# Patient Record
Sex: FEMALE | Race: WHITE | Employment: FULL TIME | URBAN - METROPOLITAN AREA
[De-identification: names, ages, dates, MRNs, and addresses within clinical notes are randomized per-mention and may not be internally consistent; named-entity substitution may affect disease eponyms.]

---

## 2019-12-11 ENCOUNTER — ANESTHESIA EVENT (OUTPATIENT)
Dept: SURGERY | Age: 54
End: 2019-12-11
Payer: COMMERCIAL

## 2019-12-12 ENCOUNTER — APPOINTMENT (OUTPATIENT)
Dept: GENERAL RADIOLOGY | Age: 54
End: 2019-12-12
Attending: ORTHOPAEDIC SURGERY
Payer: COMMERCIAL

## 2019-12-12 ENCOUNTER — ANESTHESIA (OUTPATIENT)
Dept: SURGERY | Age: 54
End: 2019-12-12
Payer: COMMERCIAL

## 2019-12-12 ENCOUNTER — HOSPITAL ENCOUNTER (OUTPATIENT)
Age: 54
Setting detail: OUTPATIENT SURGERY
Discharge: HOME | End: 2019-12-12
Attending: ORTHOPAEDIC SURGERY | Admitting: ORTHOPAEDIC SURGERY
Payer: COMMERCIAL

## 2019-12-12 VITALS
WEIGHT: 220 LBS | OXYGEN SATURATION: 97 % | HEART RATE: 71 BPM | DIASTOLIC BLOOD PRESSURE: 59 MMHG | SYSTOLIC BLOOD PRESSURE: 103 MMHG | TEMPERATURE: 97.1 F | RESPIRATION RATE: 16 BRPM

## 2019-12-12 DIAGNOSIS — M20.12 HALLUX VALGUS (ACQUIRED), LEFT FOOT: Primary | ICD-10-CM

## 2019-12-12 PROCEDURE — C1713 ANCHOR/SCREW BN/BN,TIS/BN: HCPCS | Performed by: ORTHOPAEDIC SURGERY

## 2019-12-12 PROCEDURE — 74011250636 HC RX REV CODE- 250/636: Performed by: ORTHOPAEDIC SURGERY

## 2019-12-12 PROCEDURE — 77030002933 HC SUT MCRYL J&J -A: Performed by: ORTHOPAEDIC SURGERY

## 2019-12-12 PROCEDURE — 76010000160 HC OR TIME 0.5 TO 1 HR INTENSV-TIER 1: Performed by: ORTHOPAEDIC SURGERY

## 2019-12-12 PROCEDURE — 77030000032 HC CUF TRNQT ZIMM -B: Performed by: ORTHOPAEDIC SURGERY

## 2019-12-12 PROCEDURE — 74011250636 HC RX REV CODE- 250/636: Performed by: ANESTHESIOLOGY

## 2019-12-12 PROCEDURE — 76942 ECHO GUIDE FOR BIOPSY: CPT | Performed by: ORTHOPAEDIC SURGERY

## 2019-12-12 PROCEDURE — 77030040572 HC DRVR HEX PROSTEP MICA WRGH -C: Performed by: ORTHOPAEDIC SURGERY

## 2019-12-12 PROCEDURE — 76010010054 HC POST OP PAIN BLOCK: Performed by: ORTHOPAEDIC SURGERY

## 2019-12-12 PROCEDURE — 77030003044 HC WRE K WRGH -B: Performed by: ORTHOPAEDIC SURGERY

## 2019-12-12 PROCEDURE — 77030040573 HC BUR PROSTEP MICA WRGH -C: Performed by: ORTHOPAEDIC SURGERY

## 2019-12-12 PROCEDURE — 76210000006 HC OR PH I REC 0.5 TO 1 HR: Performed by: ORTHOPAEDIC SURGERY

## 2019-12-12 PROCEDURE — 77030040571: Performed by: ORTHOPAEDIC SURGERY

## 2019-12-12 PROCEDURE — 76060000032 HC ANESTHESIA 0.5 TO 1 HR: Performed by: ORTHOPAEDIC SURGERY

## 2019-12-12 PROCEDURE — 77030003602 HC NDL NRV BLK BBMI -B: Performed by: ANESTHESIOLOGY

## 2019-12-12 PROCEDURE — 77030002916 HC SUT ETHLN J&J -A: Performed by: ORTHOPAEDIC SURGERY

## 2019-12-12 PROCEDURE — 76210000020 HC REC RM PH II FIRST 0.5 HR: Performed by: ORTHOPAEDIC SURGERY

## 2019-12-12 PROCEDURE — 77030018836 HC SOL IRR NACL ICUM -A: Performed by: ORTHOPAEDIC SURGERY

## 2019-12-12 PROCEDURE — 74011250636 HC RX REV CODE- 250/636: Performed by: REGISTERED NURSE

## 2019-12-12 DEVICE — HV SCREW
Type: IMPLANTABLE DEVICE | Site: FOOT | Status: FUNCTIONAL
Brand: PROSTEP

## 2019-12-12 DEVICE — SCREW BNE L60MM DIA4MM PROSTEP MICA: Type: IMPLANTABLE DEVICE | Site: FOOT | Status: FUNCTIONAL

## 2019-12-12 RX ORDER — SODIUM CHLORIDE 0.9 % (FLUSH) 0.9 %
5-40 SYRINGE (ML) INJECTION EVERY 8 HOURS
Status: DISCONTINUED | OUTPATIENT
Start: 2019-12-12 | End: 2019-12-12 | Stop reason: HOSPADM

## 2019-12-12 RX ORDER — MIDAZOLAM HYDROCHLORIDE 1 MG/ML
2 INJECTION, SOLUTION INTRAMUSCULAR; INTRAVENOUS
Status: COMPLETED | OUTPATIENT
Start: 2019-12-12 | End: 2019-12-12

## 2019-12-12 RX ORDER — SODIUM CHLORIDE, SODIUM LACTATE, POTASSIUM CHLORIDE, CALCIUM CHLORIDE 600; 310; 30; 20 MG/100ML; MG/100ML; MG/100ML; MG/100ML
100 INJECTION, SOLUTION INTRAVENOUS CONTINUOUS
Status: DISCONTINUED | OUTPATIENT
Start: 2019-12-12 | End: 2019-12-12 | Stop reason: HOSPADM

## 2019-12-12 RX ORDER — LIDOCAINE HYDROCHLORIDE 10 MG/ML
0.3 INJECTION INFILTRATION; PERINEURAL ONCE
Status: DISCONTINUED | OUTPATIENT
Start: 2019-12-12 | End: 2019-12-12 | Stop reason: HOSPADM

## 2019-12-12 RX ORDER — ROPIVACAINE HYDROCHLORIDE 5 MG/ML
INJECTION, SOLUTION EPIDURAL; INFILTRATION; PERINEURAL
Status: COMPLETED | OUTPATIENT
Start: 2019-12-12 | End: 2019-12-12

## 2019-12-12 RX ORDER — HYDROMORPHONE HYDROCHLORIDE 2 MG/ML
0.5 INJECTION, SOLUTION INTRAMUSCULAR; INTRAVENOUS; SUBCUTANEOUS
Status: DISCONTINUED | OUTPATIENT
Start: 2019-12-12 | End: 2019-12-12 | Stop reason: HOSPADM

## 2019-12-12 RX ORDER — CEFAZOLIN SODIUM/WATER 2 G/20 ML
2 SYRINGE (ML) INTRAVENOUS ONCE
Status: DISCONTINUED | OUTPATIENT
Start: 2019-12-12 | End: 2019-12-12

## 2019-12-12 RX ORDER — PROPOFOL 10 MG/ML
INJECTION, EMULSION INTRAVENOUS
Status: DISCONTINUED | OUTPATIENT
Start: 2019-12-12 | End: 2019-12-12 | Stop reason: HOSPADM

## 2019-12-12 RX ORDER — PROPOFOL 10 MG/ML
INJECTION, EMULSION INTRAVENOUS AS NEEDED
Status: DISCONTINUED | OUTPATIENT
Start: 2019-12-12 | End: 2019-12-12 | Stop reason: HOSPADM

## 2019-12-12 RX ORDER — SODIUM CHLORIDE 0.9 % (FLUSH) 0.9 %
5-40 SYRINGE (ML) INJECTION AS NEEDED
Status: DISCONTINUED | OUTPATIENT
Start: 2019-12-12 | End: 2019-12-12 | Stop reason: HOSPADM

## 2019-12-12 RX ORDER — CEFAZOLIN SODIUM/WATER 2 G/20 ML
2 SYRINGE (ML) INTRAVENOUS ONCE
Status: COMPLETED | OUTPATIENT
Start: 2019-12-12 | End: 2019-12-12

## 2019-12-12 RX ORDER — FENTANYL CITRATE 50 UG/ML
100 INJECTION, SOLUTION INTRAMUSCULAR; INTRAVENOUS ONCE
Status: COMPLETED | OUTPATIENT
Start: 2019-12-12 | End: 2019-12-12

## 2019-12-12 RX ORDER — CEPHALEXIN 500 MG/1
500 CAPSULE ORAL 4 TIMES DAILY
Qty: 12 CAP | Refills: 0 | Status: SHIPPED | OUTPATIENT
Start: 2019-12-12

## 2019-12-12 RX ORDER — OXYCODONE HYDROCHLORIDE 5 MG/1
10 TABLET ORAL
Status: DISCONTINUED | OUTPATIENT
Start: 2019-12-12 | End: 2019-12-12 | Stop reason: HOSPADM

## 2019-12-12 RX ADMIN — Medication 2 G: at 07:48

## 2019-12-12 RX ADMIN — PROPOFOL 50 MG: 10 INJECTION, EMULSION INTRAVENOUS at 07:54

## 2019-12-12 RX ADMIN — PROPOFOL 30 MG: 10 INJECTION, EMULSION INTRAVENOUS at 07:47

## 2019-12-12 RX ADMIN — MIDAZOLAM 2 MG: 1 INJECTION INTRAMUSCULAR; INTRAVENOUS at 07:13

## 2019-12-12 RX ADMIN — ROPIVACAINE HYDROCHLORIDE 18 ML: 5 INJECTION, SOLUTION EPIDURAL; INFILTRATION; PERINEURAL at 07:17

## 2019-12-12 RX ADMIN — ROPIVACAINE HYDROCHLORIDE 10 ML: 5 INJECTION, SOLUTION EPIDURAL; INFILTRATION; PERINEURAL at 07:19

## 2019-12-12 RX ADMIN — PROPOFOL 140 MCG/KG/MIN: 10 INJECTION, EMULSION INTRAVENOUS at 07:45

## 2019-12-12 RX ADMIN — FENTANYL CITRATE 100 MCG: 50 INJECTION, SOLUTION INTRAMUSCULAR; INTRAVENOUS at 07:13

## 2019-12-12 RX ADMIN — MEPIVACAINE HYDROCHLORIDE 10 ML: 15 INJECTION, SOLUTION EPIDURAL; INFILTRATION at 07:19

## 2019-12-12 RX ADMIN — PROPOFOL 80 MG: 10 INJECTION, EMULSION INTRAVENOUS at 07:45

## 2019-12-12 RX ADMIN — SODIUM CHLORIDE, SODIUM LACTATE, POTASSIUM CHLORIDE, AND CALCIUM CHLORIDE 100 ML/HR: 600; 310; 30; 20 INJECTION, SOLUTION INTRAVENOUS at 06:45

## 2019-12-12 RX ADMIN — MEPIVACAINE HYDROCHLORIDE 18 ML: 15 INJECTION, SOLUTION EPIDURAL; INFILTRATION at 07:17

## 2019-12-12 NOTE — ANESTHESIA PREPROCEDURE EVALUATION
Relevant Problems   No relevant active problems       Anesthetic History               Review of Systems / Medical History      Pulmonary          Smoker         Neuro/Psych              Cardiovascular                  Exercise tolerance: >4 METS     GI/Hepatic/Renal  Within defined limits              Endo/Other  Within defined limits           Other Findings              Physical Exam    Airway  Mallampati: I  TM Distance: > 6 cm  Neck ROM: normal range of motion   Mouth opening: Normal     Cardiovascular    Rhythm: regular  Rate: normal         Dental    Dentition: Edentulous, Full lower dentures and Full upper dentures     Pulmonary  Breath sounds clear to auscultation               Abdominal         Other Findings            Anesthetic Plan    ASA: 2  Anesthesia type: total IV anesthesia      Post-op pain plan if not by surgeon: peripheral nerve block single    Induction: Intravenous  Anesthetic plan and risks discussed with: Patient and Family

## 2019-12-12 NOTE — ANESTHESIA PROCEDURE NOTES
Saphenous block    Start time: 12/12/2019 7:17 AM  End time: 12/12/2019 7:19 AM  Performed by: Gerardo Vila MD  Authorized by: Gerardo Vila MD       Pre-procedure:    Indications: at surgeon's request and post-op pain management    Preanesthetic Checklist: patient identified, risks and benefits discussed, site marked, timeout performed, anesthesia consent given and patient being monitored    Timeout Time: 07:17          Block Type:   Block Type:  Saphenous  Laterality:  Left  Monitoring:  Continuous pulse ox, frequent vital sign checks, heart rate, oxygen and responsive to questions  Injection Technique:  Single shot  Patient Position: supine  Prep: chlorhexidine    Location:  Cephalad tibia  Catheter size: 25.  Needle Localization:  Anatomical landmarks and infiltration    Assessment:  Number of attempts:  1  Injection Assessment:  Incremental injection every 5 mL, negative aspiration for blood, no intravascular symptoms and no paresthesia  Patient tolerance:  Patient tolerated the procedure well with no immediate complications

## 2019-12-12 NOTE — ANESTHESIA PROCEDURE NOTES
Popliteal block with ultrasound    Start time: 12/12/2019 7:13 AM  End time: 12/12/2019 7:17 AM  Performed by: Lisy Go MD  Authorized by: iLsy Go MD       Pre-procedure:    Indications: at surgeon's request and post-op pain management    Preanesthetic Checklist: patient identified, risks and benefits discussed, site marked, timeout performed, anesthesia consent given and patient being monitored    Timeout Time: 07:13          Block Type:   Block Type:  Popliteal  Laterality:  Left  Monitoring:  Continuous pulse ox, frequent vital sign checks, heart rate, oxygen and responsive to questions  Injection Technique:  Single shot  Procedures: ultrasound guided    Prep: chlorhexidine    Location:  Lower thigh  Needle Type:  Stimuplex  Needle Gauge:  20 G  Needle Localization:  Ultrasound guidance    Assessment:  Number of attempts:  1  Injection Assessment:  Incremental injection every 5 mL, local visualized surrounding nerve on ultrasound, negative aspiration for blood, no intravascular symptoms, no paresthesia and ultrasound image on chart  Patient tolerance:  Patient tolerated the procedure well with no immediate complications

## 2019-12-12 NOTE — ANESTHESIA POSTPROCEDURE EVALUATION
Procedure(s):  LEFT MIS BUNIONECTOMY  LEFT SECOND WEIL AND P1 OSTEOTOMY/ CHOICE.    total IV anesthesia    Anesthesia Post Evaluation      Multimodal analgesia: multimodal analgesia used between 6 hours prior to anesthesia start to PACU discharge  Patient location during evaluation: PACU  Patient participation: complete - patient participated  Level of consciousness: awake  Pain management: adequate  Airway patency: patent  Anesthetic complications: no  Cardiovascular status: acceptable  Respiratory status: acceptable  Hydration status: acceptable  Post anesthesia nausea and vomiting:  none      Vitals Value Taken Time   /59 12/12/2019  9:07 AM   Temp 36.2 °C (97.1 °F) 12/12/2019  8:26 AM   Pulse 71 12/12/2019  9:07 AM   Resp 16 12/12/2019  9:07 AM   SpO2 97 % 12/12/2019  9:07 AM

## 2019-12-12 NOTE — DISCHARGE INSTRUCTIONS
INSTRUCTIONS FOLLOWING FOOT SURGERY    ACTIVITY  Elevate foot (feet) for 48 hours. Use crutches or walker as directed by your doctor. Weight bearing as tolerated in post op shoe after nerve block wears off and you can feel your leg    DIET  Clear liquids until no nausea or vomiting; then light diet for the first day. Advance to regular diet on second day, unless your doctor orders otherwise. PAIN  Take pain medications as directed by your doctor. Call your doctor if pain is NOT relieved by medication. DO NOT take aspirin or blood thinners until directed by your doctor. DRESSING CARE  Keep clean and dry until follow up appointment. FOLLOW-UP PHONE CALLS  Calls will be made by nursing staff. If you have any problems, call your doctor as needed. CALL YOUR DOCTOR IF YOU HAVE  Excessive bleeding that does not stop after holding mild pressure over the area. Temperature of 101 degrees or above. Redness, excessive swelling or bruising, and/or green or yellow, smelly discharge from incision. Loss of sensation - cold, white or blue toes. AFTER ANESTHESIA  For the first 24 hours and while taking narcotics for pain: DO NOT Drive, Drink Alcoholic beverages, or make important Decisions. Be aware of dizziness following anesthesia and while taking pain medication. DISCHARGE SUMMARY from Nurse    PATIENT INSTRUCTIONS:    After general anesthesia or intravenous sedation, for 24 hours or while taking prescription Narcotics:  · Limit your activities  · Do not drive and operate hazardous machinery  · Do not make important personal or business decisions  · Do  not drink alcoholic beverages  · If you have not urinated within 8 hours after discharge, please contact your surgeon on call. *  Please give a list of your current medications to your Primary Care Provider.     *  Please update this list whenever your medications are discontinued, doses are      changed, or new medications (including over-the-counter products) are added. *  Please carry medication information at all times in case of emergency situations. These are general instructions for a healthy lifestyle:    No smoking/ No tobacco products/ Avoid exposure to second hand smoke    Surgeon General's Warning:  Quitting smoking now greatly reduces serious risk to your health. Obesity, smoking, and sedentary lifestyle greatly increases your risk for illness    A healthy diet, regular physical exercise & weight monitoring are important for maintaining a healthy lifestyle    You may be retaining fluid if you have a history of heart failure or if you experience any of the following symptoms:  Weight gain of 3 pounds or more overnight or 5 pounds in a week, increased swelling in our hands or feet or shortness of breath while lying flat in bed. Please call your doctor as soon as you notice any of these symptoms; do not wait until your next office visit. Recognize signs and symptoms of STROKE:    F-face looks uneven    A-arms unable to move or move unevenly    S-speech slurred or non-existent    T-time-call 911 as soon as signs and symptoms begin-DO NOT go       Back to bed or wait to see if you get better-TIME IS BRAIN. Learning About How to Use Crutches  Your Care Instructions  Crutches can help you walk when you have an injured hip, leg, knee, ankle, or foot. Your doctor will tell you how much weight--if any--you can put on your leg. Be sure your crutches fit you. When you stand up in your normal posture, there should be space for two or three fingers between the top of the crutch and your armpit. When you let your hands hang down, the hand  should be at your wrists. When you put your hands on the hand , your elbows should be slightly bent. To stay safe when using crutches:  · Look straight ahead, not down at your feet.   · Clear away small rugs, cords, or anything else that could cause you to trip, slip, or fall.  · Be very careful around pets and small children. They can get in your path when you least expect it. · Be sure the rubber tips on your crutches are clean and in good condition to help prevent slipping. · Avoid slick conditions, such as wet floors and snowy or icy driveways. In bad weather, be especially careful on curbs and steps. How to use crutches  Getting ready to walk    1. Bend your elbows slightly. Press the padded top parts of the crutches against your sides, under your armpits. 2. If you have been told not to put any weight on your injured leg, keep that leg bent and off the ground. How to walk with crutches when you can put weight on the injured leg    1. Put both crutches about 12 inches in front of you. The crutches and your feet should form a triangle. Hold the crutches close enough to your body so you can push straight down on them, but leave room between the crutches for your body to pass through. Don't lean forward to reach farther. 2. Put your weight on the handgrips, not on the pads under your arms. Constant pressure against your underarms can cause numbness. 3. Move your weak or injured leg forward so it's almost even with the crutches. 4. Bring your good leg up, so it's even with your weak or injured leg. 5. Move your crutches about 12 inches in front of you, and start the next step. Sitting down    1. To sit, back up to the chair. Use one hand to hold both crutches by the handgrips, beside your injured leg. With the other hand, hold onto the seat and slowly lower yourself onto the chair. 2. Lay the crutches on the ground near your chair. If you prop them up, they may fall over. Getting up from a chair    1. To get up from a chair,  the crutches and put them in one hand beside your injured leg. 2. Put your weight on the handgrips of the crutches and on your strong leg to stand up. How to go up and down stairs using crutches    1.  Stand near the edge of the stairs. 2. Go up or down the stairs. ? If you are going up, step up with your stronger leg. Then bring the crutches and your weak or injured leg to the upper step. ? If you are going down, put your crutches and your weak or injured leg on the lower step. Then bring your stronger leg down to the lower step. Remember \"up with the good, and down with the bad\" to help you lead with the correct leg. Crutches: How to go up and down stairs with handrails    1. Stand near the edge of the stairs. 2. Put both crutches under the arm opposite the handrail. 3. Use the hand opposite the handrail to hold both crutches by the handgrips. 4. Hold onto the handrail as you go up or down. 5. Go up or down the stairs. ? If you are going up, step up with your stronger leg. Then bring the crutches and your weak or injured leg to the upper step. ? If you are going down, put your crutches and your weak or injured leg on the lower step. Then bring your stronger leg down to the lower step. Remember \"up with the good, down with the bad\" to help you lead with the correct leg. Follow-up care is a key part of your treatment and safety. Be sure to make and go to all appointments, and call your doctor if you are having problems. It's also a good idea to know your test results and keep a list of the medicines you take. Where can you learn more? Go to http://danica-mindy.info/. Enter Z737 in the search box to learn more about \"Learning About How to Use Crutches. \"  Current as of: June 26, 2019  Content Version: 12.2  © 7406-4440 [employer]. Care instructions adapted under license by [employer] (which disclaims liability or warranty for this information). If you have questions about a medical condition or this instruction, always ask your healthcare professional. [address] any warranty or liability for your use of this information.

## 2019-12-13 NOTE — OP NOTES
[facility]  OPERATIVE REPORT    Name:  Michelle Manzo  MR#:  [id number]  :  1965  ACCOUNT #:  [de-identified]  DATE OF SERVICE:  2019    PREOPERATIVE DIAGNOSES:  1. Left hallux valgus. 2.  Left fixed second clawtoe deformity. POSTOPERATIVE DIAGNOSES:  1. Left hallux valgus. 2.  Left fixed second clawtoe deformity. PROCEDURES PERFORMED:  1. Left double-level bunionectomy, [id number].  2.  Left second MTP Weil metatarsal osteotomy, 49918. 3.  Left second proximal phalangeal closing wedge osteotomy, 28885. SURGEON:  Oscar Can III, MD        ANESTHESIA:  Popliteal block with monitored anesthesia care. ESTIMATED BLOOD LOSS:  Minimal.    TOURNIQUET TIME:  24 minutes at 250 mmHg. ANTIBIOTIC PROPHYLAXIS:  Ancef given prior to the procedure. INDICATIONS FOR PROCEDURE:  The patient is a 19-year-old white female with symptomatic left hallux valgus and fixed second clawtoe who has failed conservative therapy and desires surgical treatment. Risks and benefits of the procedure including, but not limited to, risks of anesthetic complications, myocardial infarction, stroke, death; and surgical complications including damage to nerves and blood vessels, risks of infection, incomplete pain relief, risk of malunion, risk of nonunion, risk of recurrence, need for additional surgery were discussed with the patient. She understands the risks and wished to proceed with surgery at this time. PROCEDURE:  The patient's operative site was marked with indelible ink in the preop holding area. A block was placed by the Department of Anesthesia. The patient was brought to the operating room and placed supine. After preop surgical timeout, the left lower extremity was identified as the surgical site, prepped and draped in a standard sterile fashion using ChloraPrep solution.  A small incision was made near the first tarsometatarsal joint where a guidewire from the cannulated screw system was advanced. A separate small incision was then placed distally. A [employer] lucy was then introduced and a Chevron osteotomy was performed with a capital fragment shifted approximately 60-70% laterally and secured using guidewire from the cannulated screw system. A lag screw was then placed over the guidewire with good purchase noted. An Luigi osteotomy was performed to the proximal phalanx and secured using a compression screw at that time percutaneously as well; and at that point, a metatarsal Weil osteotomy of the second toe was performed under fluoroscopic guidance using a [facility] lucy. A separate plantar approach to the MTP joint of the second toe was also performed at that time where the lucy was then introduced and a closing phalangeal osteotomy was performed with good correction of the patient's clawtoe deformity. The wounds were then irrigated and closed using Monocryl and nylon sutures. A sterile dressing was then applied. Anesthesia was discontinued. The patient was transferred to recovery bed and taken to recovery room in satisfactory condition. She appeared to tolerate the procedure well. There were no apparent general or anesthetic complications. All needle and sponge counts were correct.       MD ROSA M [id number]  D:  12/12/2019 16:52  T:  12/13/2019 4:32  JOB #:  [id number]

## 2022-09-19 ENCOUNTER — HOSPITAL ENCOUNTER (OUTPATIENT)
Dept: PHYSICAL THERAPY | Age: 57
Setting detail: RECURRING SERIES
Discharge: HOME | End: 2022-09-22
Payer: COMMERCIAL

## 2022-09-19 PROCEDURE — 97110 THERAPEUTIC EXERCISES: CPT

## 2022-09-19 PROCEDURE — 97161 PT EVAL LOW COMPLEX 20 MIN: CPT

## 2022-09-19 ASSESSMENT — PAIN SCALES - GENERAL: PAINLEVEL_OUTOF10: 3

## 2022-09-19 ASSESSMENT — PAIN DESCRIPTION - LOCATION: LOCATION: BACK

## 2022-09-19 NOTE — THERAPY EVALUATION
John Moore  : 1965  Primary: Matthew Medina  Secondary:  [address] [address]  Phone: [phone]  Fax: [phone] Plan Frequency: 1 x week  Plan of Care/Certification Expiration Date: 22    PT Visit Info: Total # of Visits Approved: 12  Total # of Visits to Date: 1  Progress Note Counter: 1    Visit Count:  1    OUTPATIENT PHYSICAL THERAPY:OP NOTE TYPE: Initial Assessment 2022               Episode  Appt Desk         Treatment Diagnosis:    Generalized Muscle Weakness (M62.81)  Low Back Pain (M54.5)  Medical/Referring Diagnosis:    Cervical strain [O60. 1XXA]  Lumbar strain [D92.468X]  Referring Physician: Elva Luna MD MD Orders:  PT Eval and Treat   Return MD Appt:  TBD  Date of Onset:      Allergies:  Patient has no allergy information on record. Restrictions/Precautions:         Medications Last Reviewed:  2022     SUBJECTIVE   History of Injury/Illness (Reason for Referral):  Ms. Karen Armijo presents to outpatient PT with complaints of low back pain and neck pain. She reports that due to her low back pain she in unable to complete work duties, which include occasionally lifting 50+ lb objects, unloading food trucks, and prolonged standing. 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 She reports she is currently prescribed a muscle relaxant for her pain and takes naproxen/extra strength tylenol, which provide some relief for her. She denies reports of falls or use of an assistive device at home, and denies reports of numbness and tingling in her LE.      Patient Stated Goal(s): \"Wants the back pain to stop\"    Initial:   Back 3/10 Post Session:   Back 3/10    Past Medical History/Comorbidities:   Ms. Colonel Zuniga  has no past medical history on file. Ms. Colonel Zuniga  has no past surgical history on file. Social History/Living Environment:   Home Layout: One level  Home Access: Stairs to enter with rails  Entrance Stairs - Number of Steps: 4   Prior Level of Function/Work/Activity:   Prior level of function: Independent     Learning:   Does the patient/guardian have any barriers to learning?: No barriers  Will there be a co-learner?: No  What is the preferred language of the patient/guardian?: English  Is an  required?: No  How does the patient/guardian prefer to learn new concepts?: Demonstration; Pictures/Videos; Reading; Listening   Fall Risk Scale:    Total Score: 0  [place]ripa Fall Risk: Low (0-24)      OBJECTIVE     NATURE OF CONDITION Date: 9/19/22 Date:    Highest level of pain 10/10    Lowest level of pain 3/10    Aggravating factors Lifting heavy objects, prolonged sitting/standing    Alleviating factors Changing positions,   moving around     Frequency of symptoms Constant but worse   as day progresses    Description of symptoms \"Squeezing my back\", sharp, achy, dull    Location of tenderness Right low back (L4),   lower thoracic right side       RANGE OF MOTION Date: 9/19/22 Date:      RIGHT LEFT RIGHT LEFT   Lumbar Extension WNL, painful    Lumbar Flexion WNL, painful    Lumbar Rotation WNL, painful WNL     Lumbar Lateral Flexion WNL WNL       STRENGTH Date: 9/19/22 Date:      RIGHT LEFT RIGHT LEFT   Hip Flexion 3/5, painful 3+/5     Hip Extension 3+/5, painful 4/5     Hip Abduction NT NT     Knee Extension 3+ painful 4     Knee Flexion 5/5 5/5     Ankle Dorsiflexion 5/5 5/5       MOBILITY Date: 9/19/22 Date:    Bed mobility Independent     Transfers Independent     Gait Right lean, increased R knee valgus, increased pronation R ankle      ASSESSMENT   Initial Assessment: 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 She demonstrates strength deficits in her LE, gait abnormalities, and an inability to perform work duties due to her pain. Ms. [address] could benefit from skilled PT services to address her deficits to maximize her independence to improve her functional mobility. Problem List: (Impacting functional limitations): Body Structures, Functions, Activity Limitations Requiring Skilled Therapeutic Intervention: Decreased functional mobility ; Decreased ADL status; Decreased ROM; Decreased body mechanics; Decreased tolerance to work activity; Decreased strength; Decreased safe awareness; Decreased cognition; Decreased endurance; Decreased balance; Decreased sensation; Decreased high-level IADLs; Decreased fine motor control; Decreased coordination; Increased pain; Decreased posture   Therapy Prognosis:   Therapy Prognosis: Fair   Assessment Complexity:   Low Complexity    PLAN   Effective Dates: Plan of Care/Certification Expiration Date: 12/18/22   Frequency/Duration: Plan Frequency: 1 x week   Interventions Planned (Treatment may consist of any combination of the following):    Current Treatment Recommendations: Strengthening; ROM; Balance training; Functional mobility training; Transfer training; ADL/Self-care training; IADL training; Cognitive/Perceptual training; Endurance training; Gait training; Neuromuscular re-education; Stair training; Manual Therapy - Soft Tissue Mobilization; Manual Therapy - Joint Manipulation; Cognitive reorientation; Pain management; Return to work related activity; Home exercise program; Safety education & training; Patient/Caregiver education & training; Equipment evaluation, education, & procurement; Modalities; Positioning; Therapeutic activities     Goals:  Time Frame: 6 weeks   Patient will demonstrate understanding of a home exercise program independently. Patient will demonstrate lumbar AROM within normal limits without reports of pain >2/10.   Patient will improve her score on the Oswestry Index by 3 points from her initial score. Patient will demonstrate proper lifting and body mechanics independently. Patient will demonstrate improved BLE strength upon MMT to 4-/5. Outcome Measure: Tool Used: Modified Oswestry Low Back Pain Questionnaire  Score:  Initial: 18/50 (Date: 9/19/22) Most Recent: X/50 (Date: -- )   Interpretation of Score: Each section is scored on a 0-5 scale, 5 representing the greatest disability. The scores of each section are added together for a total score of 50. MEDICAL NECESSITY:  Skilled intervention continues to be required due to above deficits affecting participation in basic ADLs and overall functional tolerance. REASON FOR SERVICES/ OTHER COMMENTS:  Patient continues to require skilled intervention due to impairments affecting functional mobility. Regarding [facility] therapy, I certify that the treatment plan above will be carried out by a therapist or under their direction.     Thank you for this referral,    Kasandra Obando, SPT     Referring Physician Signature: Elio Alexandra MD _______________________________ Date _____________        Post Session Pain  Charge Capture  PT Visit Info MD Abhishek Polk

## 2022-09-19 NOTE — PROGRESS NOTES
Sal Robertson  : 1965  Primary: Criss Galdamez  Secondary:  [address] [address]  Phone: [phone]  Fax: [phone] Plan Frequency: 1 x week  Plan of Care/Certification Expiration Date: 22      PT Visit Info: Total # of Visits Approved: 12  Total # of Visits to Date: 1  Progress Note Counter: 1     Visit Count:  1   OUTPATIENT PHYSICAL THERAPY:OP NOTE TYPE: Treatment Note 2022       Episode  }Appt Desk             Treatment Diagnosis:    Generalized Muscle Weakness (M62.81)  Low Back Pain (M54.5)  Medical/Referring Diagnosis:    Cervical strain [N41. 1XXA]  Lumbar strain [Y65.280Q]  Referring Physician:  Sudhir Arriola MD MD Orders:  PT Eval and Treat   Date of Onset:  No data recorded   Allergies:   Patient has no allergy information on record. Restrictions/Precautions:  Restrictions/Precautions: Other (comment)No data recorded   Interventions Planned (Treatment may consist of any combination of the following):    Current Treatment Recommendations: Strengthening; ROM; Balance training; Functional mobility training; Transfer training; ADL/Self-care training; IADL training; Cognitive/Perceptual training; Endurance training; Gait training; Neuromuscular re-education; Stair training; Manual Therapy - Soft Tissue Mobilization; Manual Therapy - Joint Manipulation; Cognitive reorientation; Pain management; Return to work related activity; Home exercise program; Safety education & training; Patient/Caregiver education & training; Equipment evaluation, education, & procurement; Modalities; Positioning;  Therapeutic activities   Subjective Comments: See eval note    Initial:}  Back 3/10Post Session:     Back 3/10    Medications Last Reviewed:  2022    Updated Objective Findings:  See evaluation note from today    Treatment     GAIT TRAINING: (0 minutes): Gait training to improve and/or restore physical functioning hold  Supine March with Alternating Leg Lifts - 1 x daily - 7 x weekly - 3 sets - 10 reps    Treatment/Session Summary:    Treatment Assessment: Pt tolerated therapy well today. She verbalied an understanidng of her HEP handout. Communication/Consultation:  PT encouraged patient to perform HEP consistently. Equipment provided today: None  Recommendations/Intent for next treatment session: Next visit will focus on mobility and strengthening .     Total Treatment Billable Duration: 47 minutes with eval  Time In: 1304  Time Out: [id number] [facility], SPT  Hellen Kaplan, PT    Charge Capture  }Post Session Pain  PT Visit Info  [employer] Portal  MD Guidelines  Scanned Media  Benefits  MyChart    Future Appointments   Date Time Provider Mo Justin   9/28/2022 10:15 AM Hellen Kaplan, PT [facility]

## 2022-09-28 ENCOUNTER — HOSPITAL ENCOUNTER (OUTPATIENT)
Dept: PHYSICAL THERAPY | Age: 57
Setting detail: RECURRING SERIES
Discharge: HOME | End: 2022-10-01
Payer: COMMERCIAL

## 2022-09-28 PROCEDURE — 97110 THERAPEUTIC EXERCISES: CPT

## 2022-09-28 ASSESSMENT — PAIN SCALES - GENERAL: PAINLEVEL_OUTOF10: 2

## 2022-09-28 NOTE — PROGRESS NOTES
Mundo Ramirez  : 1965  Primary: Addy Parks  Secondary:  [address] [address]  Phone: [phone]  Fax: [phone] Plan Frequency: 1 x week  Plan of Care/Certification Expiration Date: 22      PT Visit Info: Total # of Visits Approved: 12  Total # of Visits to Date: 2  Progress Note Counter: 2     Visit Count:  2   OUTPATIENT PHYSICAL THERAPY:OP NOTE TYPE: Treatment Note 2022       Episode  }Appt Desk             Treatment Diagnosis:    Generalized Muscle Weakness (M62.81)  Low Back Pain (M54.5)  Medical/Referring Diagnosis:    Cervical strain [H58. 1XXA]  Lumbar strain [Q47.787V]  Referring Physician:  Felecia Robert MD MD Orders:  PT Eval and Treat   Date of Onset:  No data recorded   Allergies:   Patient has no allergy information on record. Restrictions/Precautions:  Restrictions/Precautions: Other (comment)  No data recorded   Interventions Planned (Treatment may consist of any combination of the following):    Current Treatment Recommendations: Strengthening; ROM; Balance training; Functional mobility training; Transfer training; ADL/Self-care training; IADL training; Cognitive/Perceptual training; Endurance training; Gait training; Neuromuscular re-education; Stair training; Manual Therapy - Soft Tissue Mobilization; Manual Therapy - Joint Manipulation; Cognitive reorientation; Pain management; Return to work related activity; Home exercise program; Safety education & training; Patient/Caregiver education & training; Equipment evaluation, education, & procurement; Modalities; Positioning; Therapeutic activities     Subjective Comments: Pt reports that there hasn't been much change since beginning therapy. Pt reports that the standing lumbar extension against wall exercise has brought her the most relief. She states that she is still experiencing high levels of pain at the end of a work day.  Pt reports that she is very interested in getting imaging and is planning on discussing that with her doctor in an upcoming visit. Initial:}    2/10    Post Session:       4/10    Medications Last Reviewed:  9/28/2022    Updated Objective Findings: None today. Treatment     GAIT TRAINING: (0 minutes): Gait training to improve and/or restore physical functioning as related to mobility, strength, balance and coordination. Ambulated with minimal visual, verbal, and tactile cueing related to stance phase, stride length, push off and heel strike. THERAPEUTIC EXERCISE: (45 minutes): Exercises per grid below to improve mobility, strength, and balance. Required minimal visual, verbal and tactile cues to promote proper body alignment, posture and body mechanics. Progressed resistance, range and repetitions as indicated. THERAPEUTIC ACTIVITY: (0 minutes): Therapeutic activities per grid below to improve mobility, strength and balance. Required minimal visual, verbal and tactile cues to promote static and dynamic balance in sitting/standing, as well as coordination of bilateral extremities. NEUROMUSCULAR RE-EDUCATION: (0 minutes): Exercise/activities per grid below to improve balance, coordination, kinesthetic sense, posture and proprioception. Required minimal visual, verbal and tactile cues to promote static and dynamic balance in sitting/standing, as well as coordination of bilateral extremities. MANUAL THERAPY: (0 minutes): Joint mobilization, soft tissue mobilization and manipulation was utilized and necessary because of the patient's restricted joint motion, painful spasm, loss of articular motion and restricted motion of soft tissue.      MODALITIES: (0 minutes): Vasopneumatic compression at 34 degrees      Date: 9/19/22 Date: 9/28/22 Date:    Activity/Exercise      Patient assessment/education HEP review and education      Prone press up 1 x 5  Not comfortable position  1 x 30 hold on elbows  Pain in lower back not LE  Unable to maintain position longer than 30 sec    Standing lumbar extension against wall 1 x 10   2 x 10    Supine alternating knee marches 1 x 10 each way Unable to do perform raising B knees - painful     Lower trunk rotations 1 x  5 each way 2 x 12 each way  Cueing to leave feet on mat    Glute bridge Unable to perform due to pain     Glute squeeze  Unable to perform due to pain    NuStep  Level 2  6 minutes    Curl ups   1 x 10  Pain in neck and discomfort in lower back     Hand heel rocking  1 x 12  Painful                   HEP   Standing Lumbar Extension at Wall - Forearms - 1 x daily - 7 x weekly - 2 sets - 10 reps - 5 hold  Supine Lower Trunk Rotation - 1 x daily - 7 x weekly - 3 sets - 10 reps - 3 hold  Supine March with Alternating Leg Lifts - 1 x daily - 7 x weekly - 3 sets - 10 reps    Treatment/Session Summary:    Treatment Assessment: Patient tolerated therapy poorly this morning. She was unable to perform many of the exercises due to increased pain and was unable to sustain positions for long periods of time. Patient reported most relief of symptoms when performing standing lumbar extension against the wall. She demonstrates decreased tolerance to activities performed during the session today. Communication/Consultation: PT encouraged patient to perform HEP consistently. Equipment provided today: None. Recommendations/Intent for next treatment session: Next visit will focus on mobility and strengthening.     Total Treatment Billable Duration: 45 minutes   Time In: 8131  Time Out: ROBERT Stovall, JADEN    Charge Capture  }Post Session Pain  PT Visit Info  [facility] Portal  MD Guidelines  Scanned Media  Benefits  MyChart    Future Appointments   Date Time Provider Mo Justin   10/5/2022 10:15 AM Caridad López PTA [facility]

## 2022-09-29 ENCOUNTER — HOSPITAL ENCOUNTER (OUTPATIENT)
Dept: GENERAL RADIOLOGY | Age: 57
Discharge: HOME | End: 2022-10-02

## 2022-09-29 ENCOUNTER — TRANSCRIBE ORDERS (OUTPATIENT)
Dept: OCCUPATIONAL MEDICINE | Age: 57
End: 2022-09-29

## 2022-09-29 DIAGNOSIS — T14.90XA INJURY: ICD-10-CM

## 2022-09-29 PROCEDURE — 72100 X-RAY EXAM L-S SPINE 2/3 VWS: CPT

## 2022-09-29 PROCEDURE — 72040 X-RAY EXAM NECK SPINE 2-3 VW: CPT

## 2022-10-05 ENCOUNTER — HOSPITAL ENCOUNTER (OUTPATIENT)
Dept: PHYSICAL THERAPY | Age: 57
Setting detail: RECURRING SERIES
Discharge: HOME | End: 2022-10-08
Payer: COMMERCIAL

## 2022-10-05 PROCEDURE — 97110 THERAPEUTIC EXERCISES: CPT

## 2022-10-05 NOTE — PROGRESS NOTES
Yin Valerio  : 1965  Primary: Mendel Dyers  Secondary:  [address] [address]  Phone: [phone]  Fax: [phone] Plan Frequency: 1 x week  Plan of Care/Certification Expiration Date: 22      PT Visit Info: Total # of Visits Approved: 12  Total # of Visits to Date: 3  Progress Note Counter: 3     Visit Count:  3   OUTPATIENT PHYSICAL THERAPY:OP NOTE TYPE: Treatment Note 10/5/2022       Episode  }Appt Desk             Treatment Diagnosis:    Generalized Muscle Weakness (M62.81)  Low Back Pain (M54.5)  Medical/Referring Diagnosis:    Cervical strain [I70. 1XXA]  Lumbar strain [M58.594G]  Referring Physician:  Shavonne Suárez MD MD Orders:  PT Eval and Treat   Date of Onset:  No data recorded   Allergies:   Patient has no allergy information on record. Restrictions/Precautions:  Restrictions/Precautions: Other (comment)  No data recorded   Interventions Planned (Treatment may consist of any combination of the following):    Current Treatment Recommendations: Strengthening; ROM; Balance training; Functional mobility training; Transfer training; ADL/Self-care training; IADL training; Cognitive/Perceptual training; Endurance training; Gait training; Neuromuscular re-education; Stair training; Manual Therapy - Soft Tissue Mobilization; Manual Therapy - Joint Manipulation; Cognitive reorientation; Pain management; Return to work related activity; Home exercise program; Safety education & training; Patient/Caregiver education & training; Equipment evaluation, education, & procurement; Modalities; Positioning; Therapeutic activities     Subjective Comments: Pt reports that her symptoms are about the same as last visit. She reports that she has relief of her symptoms with the child pose exercise and standing hip to wall exercise.  She reports that she recently had imaging done and was sent home from work untill she sees the doctor again on 10/20/22. Her working restrictions were not changed. Initial:}     (2-3)/10    Post Session:       3/10    Medications Last Reviewed:  10/5/2022    Updated Objective Findings: None today. Treatment     GAIT TRAINING: (0 minutes): Gait training to improve and/or restore physical functioning as related to mobility, strength, balance and coordination. Ambulated with minimal visual, verbal, and tactile cueing related to stance phase, stride length, push off and heel strike. THERAPEUTIC EXERCISE: (46 minutes): Exercises per grid below to improve mobility, strength, and balance. Required minimal visual, verbal and tactile cues to promote proper body alignment, posture and body mechanics. Progressed resistance, range and repetitions as indicated. THERAPEUTIC ACTIVITY: (0 minutes): Therapeutic activities per grid below to improve mobility, strength and balance. Required minimal visual, verbal and tactile cues to promote static and dynamic balance in sitting/standing, as well as coordination of bilateral extremities. NEUROMUSCULAR RE-EDUCATION: (0 minutes): Exercise/activities per grid below to improve balance, coordination, kinesthetic sense, posture and proprioception. Required minimal visual, verbal and tactile cues to promote static and dynamic balance in sitting/standing, as well as coordination of bilateral extremities. MANUAL THERAPY: (0 minutes): Joint mobilization, soft tissue mobilization and manipulation was utilized and necessary because of the patient's restricted joint motion, painful spasm, loss of articular motion and restricted motion of soft tissue.      MODALITIES: (0 minutes): Vasopneumatic compression at 34 degrees      Date: 9/19/22 Date: 9/28/22 Date: 10-5-2022   Activity/Exercise      Patient assessment/education HEP review and education      Prone press up 1 x 5  Not comfortable position  1 x 30 hold on elbows  Pain in lower back not LE  Unable to maintain position longer than 30 sec    Standing lumbar extension against wall 1 x 10   2 x 10    Supine alternating knee marches 1 x 10 each way Unable to do perform raising B knees - painful  1 x 8  each leg. Lower trunk rotations 1 x  5 each way 2 x 12 each way  Cueing to leave feet on mat 1 x 10 each way   Pain free range. Glute bridge Unable to perform due to pain     Glute squeeze  Unable to perform due to pain    NuStep  Level 2  6 minutes Level 2  7.5 minutes   Curl ups   1 x 10  Pain in neck and discomfort in lower back     Hand heel rocking  1 x 12  Painful  1 x 10    Pelvic tilt    X 3-5 sec hold    Figure 4 stretch    2 x 20 sec hold  R    Hip hinge    With dowel 1 x 10 (caused neck pain)  Without dowel 1 x 10                  HEP   Standing Lumbar Extension at Wall - Forearms - 1 x daily - 7 x weekly - 2 sets - 10 reps - 5 hold  Supine Lower Trunk Rotation - 1 x daily - 7 x weekly - 3 sets - 10 reps - 3 hold  Supine March with Alternating Leg Lifts - 1 x daily - 7 x weekly - 3 sets - 10 reps    Treatment/Session Summary:    Treatment Assessment: Patient requires lots of education on pain control with movement and guarding. Additional time required with therex for cueing and explanation of exercise. Communication/Consultation: PT encouraged patient to perform HEP consistently. Equipment provided today: None. Recommendations/Intent for next treatment session: Next visit will focus on mobility and strengthening.     Total Treatment Billable Duration: 46 minutes   Time In: 6748 (patient 5 minutes late)  Time Out: 7667      Ale Mederos PTA    Charge Capture  }Post Session Pain  PT Visit Info  [employer] Portal  MD Guidelines  Scanned Media  Benefits  MyChart    Future Appointments   Date Time Provider Mo Justin   10/12/2022 10:15 AM Jessie Cody, JADEN [facility]

## 2022-10-12 ENCOUNTER — HOSPITAL ENCOUNTER (OUTPATIENT)
Dept: PHYSICAL THERAPY | Age: 57
Setting detail: RECURRING SERIES
Discharge: HOME | End: 2022-10-15
Payer: COMMERCIAL

## 2022-10-12 PROCEDURE — 97110 THERAPEUTIC EXERCISES: CPT

## 2022-10-12 ASSESSMENT — PAIN SCALES - GENERAL: PAINLEVEL_OUTOF10: 4

## 2022-10-12 ASSESSMENT — PAIN DESCRIPTION - LOCATION: LOCATION: BACK

## 2022-10-12 NOTE — PROGRESS NOTES
Luma Andres  : 1965  Primary: Vale Olivier  Secondary:  [address] [address]  Phone: [phone]  Fax: [phone] Plan Frequency: 1 x week  Plan of Care/Certification Expiration Date: 22      PT Visit Info: Total # of Visits Approved: 12  Total # of Visits to Date: 3  Progress Note Counter: 3     Visit Count:  4   OUTPATIENT PHYSICAL THERAPY:OP NOTE TYPE: Treatment Note 10/12/2022       Episode  }Appt Desk             Treatment Diagnosis:    Generalized Muscle Weakness (M62.81)  Low Back Pain (M54.5)  Medical/Referring Diagnosis:    Cervical strain [Z64. 1XXA]  Lumbar strain [E17.295P]  Referring Physician:  Ned Isabel MD MD Orders:  PT Eval and Treat   Date of Onset:  No data recorded   Allergies:   Patient has no allergy information on record. Restrictions/Precautions:  Restrictions/Precautions: Other (comment)  No data recorded   Interventions Planned (Treatment may consist of any combination of the following):    Current Treatment Recommendations: Strengthening; ROM; Balance training; Functional mobility training; Transfer training; ADL/Self-care training; IADL training; Cognitive/Perceptual training; Endurance training; Gait training; Neuromuscular re-education; Stair training; Manual Therapy - Soft Tissue Mobilization; Manual Therapy - Joint Manipulation; Cognitive reorientation; Pain management; Return to work related activity; Home exercise program; Safety education & training; Patient/Caregiver education & training; Equipment evaluation, education, & procurement; Modalities; Positioning; Therapeutic activities     Subjective Comments: Patient reports she's not moving well today. She says she got an x-ray and it seems like she has some compression in her neck and low back.      Initial:}  Back 4/10    Post Session:     Back 4/10     Medications Last Reviewed:  10/12/2022    Updated Objective Findings: None today. Treatment     GAIT TRAINING: (0 minutes): Gait training to improve and/or restore physical functioning as related to mobility, strength, balance and coordination. Ambulated with minimal visual, verbal, and tactile cueing related to stance phase, stride length, push off and heel strike. THERAPEUTIC EXERCISE: (40 minutes): Exercises per grid below to improve mobility, strength, and balance. Required minimal visual, verbal and tactile cues to promote proper body alignment, posture and body mechanics. Progressed resistance, range and repetitions as indicated. THERAPEUTIC ACTIVITY: (0 minutes): Therapeutic activities per grid below to improve mobility, strength and balance. Required minimal visual, verbal and tactile cues to promote static and dynamic balance in sitting/standing, as well as coordination of bilateral extremities. NEUROMUSCULAR RE-EDUCATION: (0 minutes): Exercise/activities per grid below to improve balance, coordination, kinesthetic sense, posture and proprioception. Required minimal visual, verbal and tactile cues to promote static and dynamic balance in sitting/standing, as well as coordination of bilateral extremities. MANUAL THERAPY: (0 minutes): Joint mobilization, soft tissue mobilization and manipulation was utilized and necessary because of the patient's restricted joint motion, painful spasm, loss of articular motion and restricted motion of soft tissue.      MODALITIES: (0 minutes): Vasopneumatic compression at 34 degrees      Date: 9/19/22 Date: 9/28/22 Date: 10-5-2022 Date: 10/12/22   Activity/Exercise       Patient assessment/education HEP review and education       Prone press up 1 x 5  Not comfortable position  1 x 30 hold on elbows  Pain in lower back not LE  Unable to maintain position longer than 30 sec     Standing lumbar extension  1 x 10 at wall   2 x 10 at wall  2 x 10 at parallel bars   Supine alternating knee marches 1 x 10 each way Unable to do perform raising B knees - painful  1 x 8 each leg 1 x 10 each side   Lower trunk rotations 1 x  5 each way 2 x 12 each way  Cueing to leave feet on mat 1 x 10 each way   Pain free range 1 x 10 each way   Glute bridge Unable to perform due to pain      Glute squeeze  Unable to perform due to pain     NuStep  Level 2  6 minutes Level 2  7.5 minutes Level 3  6 minutes   Curl ups   1 x 10  Pain in neck and discomfort in lower back      Hand heel rocking  1 x 12  Painful  1 x 10  1 x 10 to child's pose   Pelvic tilt    X 3-5 sec hold     Figure 4 stretch    2 x 20 sec hold R  1 x 10 x 5 sec hold L  Painful on R side   Hip hinge    With dowel 1 x 10 (caused neck pain)  Without dowel 1 x 10     Exercise ball roll outs    2 x 10 with blue ball   Seated on exercise ball with alternating marches    1 x 8 each side  Seated on blue ball                                 HEP   Standing Lumbar Extension at Wall - Forearms - 1 x daily - 7 x weekly - 2 sets - 10 reps - 5 hold  Supine Lower Trunk Rotation - 1 x daily - 7 x weekly - 3 sets - 10 reps - 3 hold  Supine March with Alternating Leg Lifts - 1 x daily - 7 x weekly - 3 sets - 10 reps    Treatment/Session Summary:    Treatment Assessment: Patient tolerated therapy fairly well this morning. She reported a \"good pain/stretch\" with standing lumbar extension over parallel bars. She demonstrated some cramping/discomfort with some activities today requiring a break to change positions a little bit. Communication/Consultation: PT encouraged patient to perform HEP consistently. Equipment provided today: None. Recommendations/Intent for next treatment session: Next visit will focus on mobility and strengthening. Total Treatment Billable Duration: 40 minutes   Time In: 9427  Time Out: 70 Kojo Seaman PT    Charge Capture  }Post Session Pain  PT Visit Info  [employer] Portal  MD Guidelines  Scanned Media  Benefits  MyChart    No future appointments.

## 2022-10-18 ENCOUNTER — HOSPITAL ENCOUNTER (OUTPATIENT)
Dept: PHYSICAL THERAPY | Age: 57
Setting detail: RECURRING SERIES
Discharge: HOME | End: 2022-10-21
Payer: COMMERCIAL

## 2022-10-18 PROCEDURE — 97110 THERAPEUTIC EXERCISES: CPT

## 2022-10-18 ASSESSMENT — PAIN SCALES - GENERAL: PAINLEVEL_OUTOF10: 4

## 2022-10-18 ASSESSMENT — PAIN DESCRIPTION - LOCATION: LOCATION: BACK

## 2022-10-18 ASSESSMENT — PAIN DESCRIPTION - ORIENTATION: ORIENTATION: LOWER

## 2022-10-18 NOTE — PROGRESS NOTES
Akila Vargas  : 1965  Primary: Brian Christian  Secondary:  [address] @ [address] [address]  Phone: [phone]  Fax: [phone] Plan Frequency: 1 x week  Plan of Care/Certification Expiration Date: 22      PT Visit Info: Total # of Visits Approved: 12  Total # of Visits to Date: 5  Progress Note Counter: 5     Visit Count:  5   OUTPATIENT PHYSICAL THERAPY:OP NOTE TYPE: Treatment Note 10/18/2022       Episode  }Appt Desk             Treatment Diagnosis:    Generalized Muscle Weakness (M62.81)  Low Back Pain (M54.5)  Medical/Referring Diagnosis:    Cervical strain [Q67. 1XXA]  Lumbar strain [W28.396D]  Referring Physician:  Rock Hang MD MD Orders:  PT Eval and Treat   Date of Onset:  No data recorded   Allergies:   Patient has no allergy information on record. Restrictions/Precautions:  Restrictions/Precautions: Other (comment)  No data recorded   Interventions Planned (Treatment may consist of any combination of the following):    Current Treatment Recommendations: Strengthening; ROM; Balance training; Functional mobility training; Transfer training; ADL/Self-care training; IADL training; Cognitive/Perceptual training; Endurance training; Gait training; Neuromuscular re-education; Stair training; Manual Therapy - Soft Tissue Mobilization; Manual Therapy - Joint Manipulation; Cognitive reorientation; Pain management; Return to work related activity; Home exercise program; Safety education & training; Patient/Caregiver education & training; Equipment evaluation, education, & procurement; Modalities; Positioning; Therapeutic activities     Subjective Comments: Patient reports she's having a little pain this morning. She says this weekend was more painful for her secondary to deep cleaning her kitchen. She says her pain got up to 8/10.      Initial:}Lower Back 4/10    Post Session:  Lower  Back  /10     Medications Last Reviewed:  10/18/2022    Updated Objective Findings: None today. Treatment     GAIT TRAINING: (0 minutes): Gait training to improve and/or restore physical functioning as related to mobility, strength, balance and coordination. Ambulated with minimal visual, verbal, and tactile cueing related to stance phase, stride length, push off and heel strike. THERAPEUTIC EXERCISE: (38 minutes): Exercises per grid below to improve mobility, strength, and balance. Required minimal visual, verbal and tactile cues to promote proper body alignment, posture and body mechanics. Progressed resistance, range and repetitions as indicated. THERAPEUTIC ACTIVITY: (0 minutes): Therapeutic activities per grid below to improve mobility, strength and balance. Required minimal visual, verbal and tactile cues to promote static and dynamic balance in sitting/standing, as well as coordination of bilateral extremities. NEUROMUSCULAR RE-EDUCATION: (0 minutes): Exercise/activities per grid below to improve balance, coordination, kinesthetic sense, posture and proprioception. Required minimal visual, verbal and tactile cues to promote static and dynamic balance in sitting/standing, as well as coordination of bilateral extremities. MANUAL THERAPY: (0 minutes): Joint mobilization, soft tissue mobilization and manipulation was utilized and necessary because of the patient's restricted joint motion, painful spasm, loss of articular motion and restricted motion of soft tissue.      MODALITIES: (0 minutes): Vasopneumatic compression at 34 degrees      Date: 9/28/22 Date: 10-5-2022 Date: 10/12/22 Date: 10/18/22   Activity/Exercise       Patient assessment/education       Prone press up 1 x 30 hold on elbows  Pain in lower back not LE  Unable to maintain position longer than 30 sec      Standing lumbar extension  2 x 10 at wall  2 x 10 at parallel bars 1 x 10 at counter   Supine alternating knee marches Unable to do perform raising B knees - painful  1 x 8 each leg 1 x 10 each side    Lower trunk rotations 2 x 12 each way  Cueing to leave feet on mat 1 x 10 each way   Pain free range 1 x 10 each way 1 x 10 each way   Glute bridge       Glute squeeze Unable to perform due to pain      NuStep Level 2  6 minutes Level 2  7.5 minutes Level 3  6 minutes Level 4  8 minutes   Curl ups  1 x 10  Pain in neck and discomfort in lower back       Hand heel rocking 1 x 12  Painful  1 x 10  1 x 10 to child's pose    Pelvic tilt   X 3-5 sec hold      Figure 4 stretch   2 x 20 sec hold R  1 x 10 x 5 sec hold L  Painful on R side    Hip hinge   With dowel 1 x 10 (caused neck pain)  Without dowel 1 x 10      Exercise ball roll outs   2 x 10 with blue ball    Seated on exercise ball with alternating marches   1 x 8 each side  Seated on blue ball 2 x 10 each side  Seated on blue ball   Bird dogs    1 x 5 each side   Child's pose    1 x 5   Side plank    1 x 5 x 5 sec hold   Each side   QL raises with KB    1 x 5 L, 1 x 3 R with 20# KB  Pain in right side of back/leg                                 HEP   Standing Lumbar Extension at Wall - Forearms - 1 x daily - 7 x weekly - 2 sets - 10 reps - 5 hold  Supine Lower Trunk Rotation - 1 x daily - 7 x weekly - 3 sets - 10 reps - 3 hold  Supine March with Alternating Leg Lifts - 1 x daily - 7 x weekly - 3 sets - 10 reps    Treatment/Session Summary:    Treatment Assessment: Patient tolerated therapy well today. She reported some pain in her back with some of the weighted activities. She verbalizes compliance with her HEP at home. Communication/Consultation: PT encouraged patient to perform HEP consistently. Equipment provided today: None. Recommendations/Intent for next treatment session: Next visit will focus on mobility and strengthening.     Total Treatment Billable Duration: 38 minutes   Time In: 1102  Time Out: [address], PT    Charge Capture  }Post Session Pain  PT Visit Claudene Confer MD Guidelines  Scanned Media  Benefits  MyChart    No future appointments.

## 2022-10-21 ENCOUNTER — HOSPITAL ENCOUNTER (OUTPATIENT)
Dept: PHYSICAL THERAPY | Age: 57
Setting detail: RECURRING SERIES
Discharge: HOME | End: 2022-10-24
Payer: COMMERCIAL

## 2022-10-21 PROCEDURE — 97110 THERAPEUTIC EXERCISES: CPT

## 2022-10-21 ASSESSMENT — PAIN DESCRIPTION - LOCATION: LOCATION: BACK

## 2022-10-21 ASSESSMENT — PAIN DESCRIPTION - ORIENTATION: ORIENTATION: LOWER

## 2022-10-21 ASSESSMENT — PAIN SCALES - GENERAL: PAINLEVEL_OUTOF10: 4

## 2022-10-21 NOTE — PROGRESS NOTES
Yin Valerio  : 1965  Primary: Mendel Dyers  Secondary:  [address] [address]  Phone: [phone]  Fax: [phone] Plan Frequency: 1 x week  Plan of Care/Certification Expiration Date: 22      PT Visit Info: Total # of Visits Approved: 12  Total # of Visits to Date: 6  Progress Note Counter: 6     Visit Count:  6   OUTPATIENT PHYSICAL THERAPY:OP NOTE TYPE: Treatment Note 10/21/2022       Episode  }Appt Desk             Treatment Diagnosis:    Generalized Muscle Weakness (M62.81)  Low Back Pain (M54.5)  Medical/Referring Diagnosis:    Cervical strain [I72. 1XXA]  Lumbar strain [K50.854B]  Referring Physician:  Shavonne Suárez MD MD Orders:  PT Eval and Treat   Date of Onset:  No data recorded   Allergies:   Patient has no allergy information on record. Restrictions/Precautions:  Restrictions/Precautions: Other (comment)  No data recorded   Interventions Planned (Treatment may consist of any combination of the following):    Current Treatment Recommendations: Strengthening; ROM; Balance training; Functional mobility training; Transfer training; ADL/Self-care training; IADL training; Cognitive/Perceptual training; Endurance training; Gait training; Neuromuscular re-education; Stair training; Manual Therapy - Soft Tissue Mobilization; Manual Therapy - Joint Manipulation; Cognitive reorientation; Pain management; Return to work related activity; Home exercise program; Safety education & training; Patient/Caregiver education & training; Equipment evaluation, education, & procurement; Modalities; Positioning; Therapeutic activities     Subjective Comments: Patient reports she's excited because she will be able to get an MRI soon per her doctor. She says she's hurting some in her back this morning.      Initial:}Lower Back 10    Post Session:  Lower  Back 10     Medications Last Reviewed:  10/21/2022    Updated Objective Findings: None today. Treatment     GAIT TRAINING: (0 minutes): Gait training to improve and/or restore physical functioning as related to mobility, strength, balance and coordination. Ambulated with minimal visual, verbal, and tactile cueing related to stance phase, stride length, push off and heel strike. THERAPEUTIC EXERCISE: (41 minutes): Exercises per grid below to improve mobility, strength, and balance. Required minimal visual, verbal and tactile cues to promote proper body alignment, posture and body mechanics. Progressed resistance, range and repetitions as indicated. THERAPEUTIC ACTIVITY: (0 minutes): Therapeutic activities per grid below to improve mobility, strength and balance. Required minimal visual, verbal and tactile cues to promote static and dynamic balance in sitting/standing, as well as coordination of bilateral extremities. NEUROMUSCULAR RE-EDUCATION: (0 minutes): Exercise/activities per grid below to improve balance, coordination, kinesthetic sense, posture and proprioception. Required minimal visual, verbal and tactile cues to promote static and dynamic balance in sitting/standing, as well as coordination of bilateral extremities. MANUAL THERAPY: (0 minutes): Joint mobilization, soft tissue mobilization and manipulation was utilized and necessary because of the patient's restricted joint motion, painful spasm, loss of articular motion and restricted motion of soft tissue.      MODALITIES: (0 minutes): Vasopneumatic compression at 34 degrees      Date: 10-5-2022 Date: 10/12/22 Date: 10/18/22 Date: 10/21/22   Activity/Exercise       Patient assessment/education       Prone press up       Standing lumbar extension   2 x 10 at parallel bars 1 x 10 at counter 1 x 10 at counter   Supine alternating knee marches 1 x 8 each leg 1 x 10 each side     Lower trunk rotations 1 x 10 each way   Pain free range 1 x 10 each way 1 x 10 each way 1 x 15 each way   Glute bridge       Glute squeeze       NuStep Level 2  7.5 minutes Level 3  6 minutes Level 4  8 minutes Level 3  8 minutes   Curl ups        Hand heel rocking 1 x 10  1 x 10 to child's pose     Posterior pelvic tilt (PPT) X 3-5 sec hold       Figure 4 stretch  2 x 20 sec hold R  1 x 10 x 5 sec hold L  Painful on R side     Hip hinge  With dowel 1 x 10 (caused neck pain)  Without dowel 1 x 10       Exercise ball roll outs  2 x 10 with blue ball     Seated on exercise ball with alternating marches  1 x 8 each side  Seated on blue ball 2 x 10 each side  Seated on blue ball    Bird dogs   1 x 5 each side    Child's pose   1 x 5    Side plank   1 x 5 x 5 sec hold   Each side    QL raises with KB   1 x 5 L, 1 x 3 R with 20# KB  Pain in right side of back/leg    PPT with unilateral knee extension from hooklying    1 x 10 each side  Pillow case around shoe   Modified dead bugs    1 x 8 each side                                 HEP   Standing Lumbar Extension at Wall - Forearms - 1 x daily - 7 x weekly - 2 sets - 10 reps - 5 hold  Supine Lower Trunk Rotation - 1 x daily - 7 x weekly - 3 sets - 10 reps - 3 hold  Supine March with Alternating Leg Lifts - 1 x daily - 7 x weekly - 3 sets - 10 reps    Treatment/Session Summary:    Treatment Assessment: Patient tolerated therapy fairly well this morning. She reported increased pain in her back with attempt in performing dead bugs. Communication/Consultation: PT encouraged patient to perform HEP consistently. Equipment provided today: None. Recommendations/Intent for next treatment session: Next visit will focus on mobility and strengthening.     Total Treatment Billable Duration: 41 minutes   Time In: 2735  Time Out: [address] [place] [address], PT    Charge Capture  }Post Session Pain  PT Visit Info  [facility] Portal  MD Guidelines  Scanned Media  Benefits  MyChart    Future Appointments   Date Time Provider Mo Justin   10/25/2022  1:00 PM Kailash Miller [facility]   10/27/2022  1:00 PM Odessa Malave, PT [facility]   11/1/2022 11:00 AM Odessa Malave, PT [facility]   11/3/2022 11:00 AM Odessa Malave, PT [facility]   11/8/2022 11:00 AM Odessa Malave, PT [facility]

## 2022-10-25 ENCOUNTER — HOSPITAL ENCOUNTER (OUTPATIENT)
Dept: PHYSICAL THERAPY | Age: 57
Setting detail: RECURRING SERIES
Discharge: HOME | End: 2022-10-28
Payer: COMMERCIAL

## 2022-10-25 PROCEDURE — 97110 THERAPEUTIC EXERCISES: CPT

## 2022-10-25 ASSESSMENT — PAIN DESCRIPTION - LOCATION: LOCATION: BACK

## 2022-10-25 ASSESSMENT — PAIN DESCRIPTION - ORIENTATION: ORIENTATION: LOWER

## 2022-10-25 ASSESSMENT — PAIN SCALES - GENERAL: PAINLEVEL_OUTOF10: 2

## 2022-10-27 ENCOUNTER — HOSPITAL ENCOUNTER (OUTPATIENT)
Dept: PHYSICAL THERAPY | Age: 57
Setting detail: RECURRING SERIES
Discharge: HOME | End: 2022-10-30
Payer: COMMERCIAL

## 2022-10-27 PROCEDURE — 97110 THERAPEUTIC EXERCISES: CPT

## 2022-10-27 ASSESSMENT — PAIN DESCRIPTION - LOCATION: LOCATION: NECK

## 2022-10-27 ASSESSMENT — PAIN SCALES - GENERAL: PAINLEVEL_OUTOF10: 6

## 2022-10-27 NOTE — PROGRESS NOTES
Naveen Maldonado  : 1965  Primary: Rob Zabala  Secondary:  [address] @ [address] [address]  Phone: [phone]  Fax: [phone] Plan Frequency: 1 x week  Plan of Care/Certification Expiration Date: 22      PT Visit Info: Total # of Visits Approved: 12  Total # of Visits to Date: 8  Progress Note Counter: 8     Visit Count:  8   OUTPATIENT PHYSICAL THERAPY:OP NOTE TYPE: Treatment Note 10/27/2022       Episode  }Appt Desk             Treatment Diagnosis:    Generalized Muscle Weakness (M62.81)  Low Back Pain (M54.5)  Medical/Referring Diagnosis:    Cervical strain [A23. 1XXA]  Lumbar strain [T78.972M]  Referring Physician:  Jerry Burrows MD MD Orders:  PT Eval and Treat   Date of Onset:  No data recorded   Allergies:   Patient has no allergy information on record. Restrictions/Precautions:  Restrictions/Precautions: Other (comment)  No data recorded   Interventions Planned (Treatment may consist of any combination of the following):    Current Treatment Recommendations: Strengthening; ROM; Balance training; Functional mobility training; Transfer training; ADL/Self-care training; IADL training; Cognitive/Perceptual training; Endurance training; Gait training; Neuromuscular re-education; Stair training; Manual Therapy - Soft Tissue Mobilization; Manual Therapy - Joint Manipulation; Cognitive reorientation; Pain management; Return to work related activity; Home exercise program; Safety education & training; Patient/Caregiver education & training; Equipment evaluation, education, & procurement; Modalities; Positioning; Therapeutic activities     Subjective Comments: Patient reports she's having some significant neck pain today. She says she was doing some yard work and thinks that was the culprit.      Initial:}  Neck 610    Post Session:     Neck 610     Medications Last Reviewed:  10/27/2022    Updated Objective Findings: None today.    Treatment     GAIT TRAINING: (0 minutes): Gait training to improve and/or restore physical functioning as related to mobility, strength, balance and coordination. Ambulated with minimal visual, verbal, and tactile cueing related to stance phase, stride length, push off and heel strike. THERAPEUTIC EXERCISE: (39 minutes): Exercises per grid below to improve mobility, strength, and balance. Required minimal visual, verbal and tactile cues to promote proper body alignment, posture and body mechanics. Progressed resistance, range and repetitions as indicated. THERAPEUTIC ACTIVITY: (0 minutes): Therapeutic activities per grid below to improve mobility, strength and balance. Required minimal visual, verbal and tactile cues to promote static and dynamic balance in sitting/standing, as well as coordination of bilateral extremities. NEUROMUSCULAR RE-EDUCATION: (0 minutes): Exercise/activities per grid below to improve balance, coordination, kinesthetic sense, posture and proprioception. Required minimal visual, verbal and tactile cues to promote static and dynamic balance in sitting/standing, as well as coordination of bilateral extremities. MANUAL THERAPY: (0 minutes): Joint mobilization, soft tissue mobilization and manipulation was utilized and necessary because of the patient's restricted joint motion, painful spasm, loss of articular motion and restricted motion of soft tissue.      MODALITIES: (0 minutes): Vasopneumatic compression at 34 degrees      Date: 10/18/22 Date: 10/21/22 Date: 10/25/22 Date: 10/27/22   Activity/Exercise       Patient assessment/education       Prone press up   2 x 10    Standing lumbar extension  1 x 10 at counter 1 x 10 at counter 2 x 10 at  bars 2 x 10 at  bars   Supine alternating knee marches       Lower trunk rotations 1 x 10 each way 1 x 15 each way     Glute bridge       Glute squeeze       NuStep Level 4  8 minutes Level 3  8 minutes Level 3  10 minutes Level 3  8 minutes   Curl ups        Hand heel rocking       Posterior pelvic tilt (PPT)       Figure 4 stretch        Hip hinge     1 x 10 with 17.5# cables   Exercise ball roll outs   1 x 15 with blue ball    Seated on exercise ball with alternating marches 2 x 10 each side  Seated on blue ball      Bird dogs 1 x 5 each side      Child's pose 1 x 5      Side plank 1 x 5 x 5 sec hold   Each side      QL raises with weight 1 x 5 L, 1 x 3 R with 20# KB  Pain in right side of back/leg   1 x 10 each side 10#   PPT with unilateral knee extension from hooklying  1 x 10 each side  Pillow case around shoe     Modified dead bugs  1 x 8 each side     Leaning at counter plank with unilateral hip extension   2 x 6 each side    Pallof presses    2 x 10 each way 7.5# cables   Suitcase carry    1 lap with 20# KB in each hand                                 HEP   Standing Lumbar Extension at Wall - Forearms - 1 x daily - 7 x weekly - 2 sets - 10 reps - 5 hold  Supine Lower Trunk Rotation - 1 x daily - 7 x weekly - 3 sets - 10 reps - 3 hold  Supine March with Alternating Leg Lifts - 1 x daily - 7 x weekly - 3 sets - 10 reps    Treatment/Session Summary:    Treatment Assessment: Patient tolerated therapy well this afternoon. She reported some discomfort with activities today, more so in her neck/UE than in her back. PT encouraged her to perform household tasks close to her body rather than straining to reach far. Communication/Consultation: PT encouraged patient to perform HEP consistently. Equipment provided today: None. Recommendations/Intent for next treatment session: Next visit will focus on mobility and strengthening.     Total Treatment Billable Duration: 39 minutes   Time In: 1300  Time Out: Fabienne 0252, PT    Charge Capture  }Post Session Pain  PT Visit [facility] Portal  MD Guidelines  Scanned Media  Benefits  MyChart    Future Appointments   Date Time Provider Mo Justin   11/1/2022 11:00 AM Grover Wise, PT [facility]   11/3/2022 11:00 AM Grover Wise PT [facility]   11/8/2022 11:00 AM Grover Wise, PT [facility]

## 2022-11-01 ENCOUNTER — HOSPITAL ENCOUNTER (OUTPATIENT)
Dept: PHYSICAL THERAPY | Age: 57
Setting detail: RECURRING SERIES
Discharge: HOME | End: 2022-11-04
Payer: COMMERCIAL

## 2022-11-01 PROCEDURE — 97110 THERAPEUTIC EXERCISES: CPT

## 2022-11-01 ASSESSMENT — PAIN DESCRIPTION - LOCATION: LOCATION: BACK

## 2022-11-01 ASSESSMENT — PAIN DESCRIPTION - ORIENTATION: ORIENTATION: LOWER

## 2022-11-01 ASSESSMENT — PAIN SCALES - GENERAL: PAINLEVEL_OUTOF10: 3

## 2022-11-01 NOTE — PROGRESS NOTES
Ritchie Hernández  : 1965  Primary: Naveed Dhaliwal  Secondary:  Nancie Lucas @ [address] [address]  Phone: [phone]  Fax: [phone] Plan Frequency: 1 x week  Plan of Care/Certification Expiration Date: 22      PT Visit Info: Total # of Visits Approved: 12  Total # of Visits to Date: 9  Progress Note Counter: 9      Visit Count:  9    OUTPATIENT PHYSICAL THERAPY:OP NOTE TYPE: Discharge Summary 2022               Episode  Appt Desk         Treatment Diagnosis:    Generalized Muscle Weakness (M62.81)  Low Back Pain (M54.5)  Medical/Referring Diagnosis:    Cervical strain [S32. 1XXA]  Lumbar strain [R46.564V]  Referring Physician: Elena Doran MD MD Orders:  PT Eval and Treat   Return MD Appt:  TBD  Date of Onset:      Allergies:  Patient has no allergy information on record. Restrictions/Precautions:    Restrictions/Precautions: Other (comment)      Medications Last Reviewed:  2022     SUBJECTIVE   History of Injury/Illness (Reason for Referral):  Ms. Castro Ortiz presents to outpatient PT with complaints of low back pain and neck pain. She reports that due to her low back pain she in unable to complete work duties, which include occasionally lifting 50+ lb objects, unloading food trucks, and prolonged standing. 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 She reports she is currently prescribed a muscle relaxant for her pain and takes naproxen/extra strength tylenol, which provide some relief for her. She denies reports of falls or use of an assistive device at home, and denies reports of numbness and tingling in her LE.      Patient Stated Goal(s): \"Wants the back pain to stop\"    Initial: Lower Back 3/10 Post Session: Lower Back 2/10    Past Medical History/Comorbidities:   Ms. Doug Phelps  has no past medical history on file. Ms. Doug Phelps  has no past surgical history on file. Social History/Living Environment:   Home Layout: One level  Home Access: Stairs to enter with rails  Entrance Stairs - Number of Steps: 4     Prior Level of Function/Work/Activity:   Prior level of function: Independent       Learning:   Does the patient/guardian have any barriers to learning?: No barriers  Will there be a co-learner?: No  What is the preferred language of the patient/guardian?: English  Is an  required?: No  How does the patient/guardian prefer to learn new concepts?: Demonstration; Pictures/Videos; Reading; Listening     Fall Risk Scale:    Zamarripa Total Score: 0  Zamarripa Fall Risk: Low (0-24)      OBJECTIVE     NATURE OF CONDITION Date: 9/19/22 Date: 11/1/22   Highest level of pain 10/10 8/10   Lowest level of pain 3/10 2/10   Aggravating factors Lifting heavy objects, prolonged sitting/standing Intense activity   Alleviating factors Changing positions,   moving around  Rest, extension   Frequency of symptoms Constant but worse   as day progresses --   Description of symptoms \"Squeezing my back\",   sharp, achy, dull --   Location of tenderness Right low back (L4),   lower thoracic right side  --     RANGE OF MOTION Date: 9/19/22 Date: 11/1/22     RIGHT LEFT RIGHT LEFT   Lumbar Extension WNL, painful WNL   Lumbar Flexion WNL, painful WNL   Lumbar Rotation WNL, painful WNL WNL WNL   Lumbar Lateral Flexion WNL WNL WNL WNL     STRENGTH Date: 9/19/22 Date: 11/1/22     RIGHT LEFT RIGHT LEFT   Hip Flexion 3/5, painful 3+/5 4/5; painful 4+/5   Hip Extension 3+/5, painful 4/5 4/5 4/5   Knee Extension 3+/5, painful 4/5 4+/5 4+/5   Knee Flexion 5/5 5/5 5/5 5/5   Ankle Dorsiflexion 5/5 5/5 5/5 5/5     MOBILITY Date: 9/19/22 Date: 11/1/22   Bed mobility Independent  Independent   Transfers Independent  Independent   Gait Right lean, increased R knee valgus, increased pronation R ankle WNL     ASSESSMENT   Initial Assessment: Ms. Damaso Carey presents to outpatient PT with complaints of low back pain and neck pain. She demonstrates strength deficits in her LE, gait abnormalities, and an inability to perform work duties due to her pain. Ms. Damaso Carey could benefit from skilled PT services to address her deficits to maximize her independence to improve her functional mobility. Discharge Assessment: Ms. Damaso Carey has participated in 5 PT visits. Patient reports she typically feels the same or maybe a little more intense pain usually after therapy. She says that performing stretches feels good in the moment. She demonstrates improvements in activity tolerance, pain-free ROM, and strength. She met all of the goals established at the start of care. PT encouraged her to continue to perform HEP at home for max carryover. She will be discharged from PT at this time. Problem List: (Impacting functional limitations): Body Structures, Functions, Activity Limitations Requiring Skilled Therapeutic Intervention: Decreased functional mobility ; Decreased ADL status; Decreased ROM; Decreased body mechanics; Decreased tolerance to work activity; Decreased strength; Decreased safe awareness; Decreased cognition; Decreased endurance; Decreased balance; Decreased sensation; Decreased high-level IADLs; Decreased fine motor control; Decreased coordination;  Increased pain; Decreased posture     Therapy Prognosis:   Therapy Prognosis: Fair     Assessment Complexity:      PLAN   Effective Dates: Plan of Care/Certification Expiration Date: 12/18/22     Frequency/Duration: Plan Frequency: 1 x week     Interventions Planned (Treatment may consist of any combination of the following):    Current Treatment Recommendations: Strengthening; ROM; Balance training; Functional mobility training; Transfer training; ADL/Self-care training; IADL training; Cognitive/Perceptual training; Endurance training; Gait training; Neuromuscular re-education; Stair training; Manual Therapy - Soft Tissue Mobilization; Manual Therapy - Joint Manipulation; Cognitive reorientation; Pain management; Return to work related activity; Home exercise program; Safety education & training; Patient/Caregiver education & training; Equipment evaluation, education, & procurement; Modalities; Positioning; Therapeutic activities     Goals:  Time Frame: 6 weeks   Patient will demonstrate understanding of a home exercise program independently. MET 11/1/22  Patient will demonstrate lumbar AROM within normal limits without reports of pain >2/10. MET 11/1/22  Patient will improve her score on the Oswestry Index by 3 points from her initial score. MET 11/1/22  Patient will demonstrate proper lifting and body mechanics independently. MET 11/1/22  Patient will demonstrate improved BLE strength upon MMT to 4-/5. MET 11/1/22         Outcome Measure: Tool Used: Modified Oswestry Low Back Pain Questionnaire  Score:  Initial: 18/50 (Date: 9/19/22) Most Recent: 14/50 (Date: 11/1/22)   Interpretation of Score: Each section is scored on a 0-5 scale, 5 representing the greatest disability. The scores of each section are added together for a total score of 50. MEDICAL NECESSITY:  Skilled intervention continues to be required due to above deficits affecting participation in basic ADLs and overall functional tolerance. REASON FOR SERVICES/ OTHER COMMENTS:  Patient continues to require skilled intervention due to impairments affecting functional mobility. Regarding [facility] therapy, I certify that the treatment plan above will be carried out by a therapist or under their direction. Thank you for this referral,    Shaheed Boyd, PT, DPT    Referring Physician Signature: Kaitlyn Hdez MD No Signature is Required for this note.         Post Session Pain Charge Capture  PT Visit Info MD Guidelines  MyChart

## 2022-11-01 NOTE — PROGRESS NOTES
Shannan Viramontes  : 1965  Primary: Janey Johnson  Secondary:  [address] @ [address] [address]  Phone: [phone]  Fax: [phone] Plan Frequency: 1 x week  Plan of Care/Certification Expiration Date: 22      PT Visit Info: Total # of Visits Approved: 12  Total # of Visits to Date: 9  Progress Note Counter: 9     Visit Count:  9   OUTPATIENT PHYSICAL THERAPY:OP NOTE TYPE: Treatment Note 2022       Episode  }Appt Desk             Treatment Diagnosis:    Generalized Muscle Weakness (M62.81)  Low Back Pain (M54.5)  Medical/Referring Diagnosis:    Cervical strain [J21. 1XXA]  Lumbar strain [U31.894I]  Referring Physician:  Tigist Lerma MD MD Orders:  PT Eval and Treat   Date of Onset:  No data recorded   Allergies:   Patient has no allergy information on record. Restrictions/Precautions:  Restrictions/Precautions: Other (comment)  No data recorded   Interventions Planned (Treatment may consist of any combination of the following):    Current Treatment Recommendations: Strengthening; ROM; Balance training; Functional mobility training; Transfer training; ADL/Self-care training; IADL training; Cognitive/Perceptual training; Endurance training; Gait training; Neuromuscular re-education; Stair training; Manual Therapy - Soft Tissue Mobilization; Manual Therapy - Joint Manipulation; Cognitive reorientation; Pain management; Return to work related activity; Home exercise program; Safety education & training; Patient/Caregiver education & training; Equipment evaluation, education, & procurement; Modalities; Positioning; Therapeutic activities     Subjective Comments: Patient reports she's feeling some achiness this morning. Initial:}Lower Back 3/10    Post Session:  Lower  Back 2/10     Medications Last Reviewed:  2022    Updated Objective Findings: None today.     Treatment     GAIT TRAINING: (0 minutes): Gait training to improve and/or restore physical functioning as related to mobility, strength, balance and coordination. Ambulated with minimal visual, verbal, and tactile cueing related to stance phase, stride length, push off and heel strike. THERAPEUTIC EXERCISE: (40 minutes): Exercises per grid below to improve mobility, strength, and balance. Required minimal visual, verbal and tactile cues to promote proper body alignment, posture and body mechanics. Progressed resistance, range and repetitions as indicated. THERAPEUTIC ACTIVITY: (0 minutes): Therapeutic activities per grid below to improve mobility, strength and balance. Required minimal visual, verbal and tactile cues to promote static and dynamic balance in sitting/standing, as well as coordination of bilateral extremities. NEUROMUSCULAR RE-EDUCATION: (0 minutes): Exercise/activities per grid below to improve balance, coordination, kinesthetic sense, posture and proprioception. Required minimal visual, verbal and tactile cues to promote static and dynamic balance in sitting/standing, as well as coordination of bilateral extremities. MANUAL THERAPY: (0 minutes): Joint mobilization, soft tissue mobilization and manipulation was utilized and necessary because of the patient's restricted joint motion, painful spasm, loss of articular motion and restricted motion of soft tissue.      MODALITIES: (0 minutes): Vasopneumatic compression at 34 degrees      Date: 10/21/22 Date: 10/25/22 Date: 10/27/22 Date: 11/1/22   Activity/Exercise       Patient assessment/education    DC assessment and HEP review   Prone press up  2 x 10     Standing lumbar extension  1 x 10 at counter 2 x 10 at  bars 2 x 10 at  bars 1 x 10 at  bars   Supine alternating knee marches       Lower trunk rotations 1 x 15 each way      Glute bridge       Glute squeeze       NuStep Level 3  8 minutes Level 3  10 minutes Level 3  8 minutes    Curl ups        Hand heel rocking       Posterior pelvic tilt (PPT)       Figure 4 stretch        Hip hinge    1 x 10 with 17.5# cables    Exercise ball roll outs  1 x 15 with blue ball     Seated on exercise ball with alternating marches       Bird dogs       Child's pose       Side plank       QL raises with weight   1 x 10 each side 10#    PPT with unilateral knee extension from hooklying 1 x 10 each side  Pillow case around shoe      Modified dead bugs 1 x 8 each side      Leaning at counter plank with unilateral hip extension  2 x 6 each side     Pallof presses   2 x 10 each way 7.5# cables    Suitcase carry   1 lap with 20# KB in each hand    Standing marches with goblet hold    1 x 8 each side with 25# KB   Lateral step ups    1 x 10 each side  6\" step                   HEP   Standing Lumbar Extension at Wall - Forearms - 1 x daily - 7 x weekly - 2 sets - 10 reps - 5 hold  Supine Lower Trunk Rotation - 1 x daily - 7 x weekly - 3 sets - 10 reps - 3 hold  Supine March with Alternating Leg Lifts - 1 x daily - 7 x weekly - 3 sets - 10 reps    Treatment/Session Summary:    Treatment Assessment: Patient undergoing discharge summary this visit. See other note for details. PT provided patient with updated HEP handout; she verbalized understanding. Communication/Consultation: PT encouraged patient to perform HEP consistently. Equipment provided today: None. Recommendations/Intent for next treatment session: Next visit will focus on mobility and strengthening.     Total Treatment Billable Duration: 40 minutes   Time In: 1272  Time Out: JADEN Scales    Charge Capture  }Post Session Pain  PT Visit Info  [address] [facility] Portal  MD Guidelines  Scanned Media  Benefits  MyChart    Future Appointments   Date Time Provider Mo Justin   11/1/2022 11:00 AM Morgan Ray [facility]   11/3/2022 11:00 AM Piyush Gonzalez PT [facility]   11/8/2022 11:00 AM Piyush Gonzalez PT [facility]

## 2022-11-03 ENCOUNTER — APPOINTMENT (OUTPATIENT)
Dept: PHYSICAL THERAPY | Age: 57
End: 2022-11-03
Payer: COMMERCIAL

## 2022-11-08 ENCOUNTER — APPOINTMENT (OUTPATIENT)
Dept: PHYSICAL THERAPY | Age: 57
End: 2022-11-08
Payer: COMMERCIAL

## 2022-11-28 ENCOUNTER — CLINICAL DOCUMENTATION (OUTPATIENT)
Dept: ORTHOPEDIC SURGERY | Age: 57
End: 2022-11-28

## 2023-05-24 ENCOUNTER — CLINICAL DOCUMENTATION (OUTPATIENT)
Dept: PHYSICAL THERAPY | Age: 58
End: 2023-05-24

## 2023-06-02 ENCOUNTER — APPOINTMENT (OUTPATIENT)
Dept: GENERAL RADIOLOGY | Age: 58
End: 2023-06-02

## 2023-06-02 ENCOUNTER — HOSPITAL ENCOUNTER (EMERGENCY)
Age: 58
Discharge: HOME | End: 2023-06-02
Attending: EMERGENCY MEDICINE

## 2023-06-02 VITALS
DIASTOLIC BLOOD PRESSURE: 81 MMHG | TEMPERATURE: 97.5 F | RESPIRATION RATE: 18 BRPM | OXYGEN SATURATION: 97 % | SYSTOLIC BLOOD PRESSURE: 113 MMHG | HEART RATE: 93 BPM | BODY MASS INDEX: 34.07 KG/M2 | HEIGHT: 69 IN | WEIGHT: 230 LBS

## 2023-06-02 DIAGNOSIS — S53.114A CLOSED ANTERIOR DISLOCATION OF RIGHT ELBOW, INITIAL ENCOUNTER: Primary | ICD-10-CM

## 2023-06-02 DIAGNOSIS — S52.501A CLOSED FRACTURE OF DISTAL END OF RIGHT RADIUS, UNSPECIFIED FRACTURE MORPHOLOGY, INITIAL ENCOUNTER: ICD-10-CM

## 2023-06-02 PROCEDURE — 96374 THER/PROPH/DIAG INJ IV PUSH: CPT

## 2023-06-02 PROCEDURE — 25675 CLTX DSTL RAD/ULN DISLC MNPJ: CPT

## 2023-06-02 PROCEDURE — 96375 TX/PRO/DX INJ NEW DRUG ADDON: CPT

## 2023-06-02 PROCEDURE — 73070 X-RAY EXAM OF ELBOW: CPT

## 2023-06-02 PROCEDURE — 73110 X-RAY EXAM OF WRIST: CPT

## 2023-06-02 PROCEDURE — 24600 TX CLSD ELBOW DISLC W/O ANES: CPT

## 2023-06-02 PROCEDURE — 6360000002 HC RX W HCPCS: Performed by: EMERGENCY MEDICINE

## 2023-06-02 PROCEDURE — 99285 EMERGENCY DEPT VISIT HI MDM: CPT

## 2023-06-02 PROCEDURE — 73080 X-RAY EXAM OF ELBOW: CPT

## 2023-06-02 PROCEDURE — 96376 TX/PRO/DX INJ SAME DRUG ADON: CPT

## 2023-06-02 RX ORDER — PROPOFOL 10 MG/ML
200 INJECTION, EMULSION INTRAVENOUS
Status: DISCONTINUED | OUTPATIENT
Start: 2023-06-02 | End: 2023-06-02 | Stop reason: HOSPADM

## 2023-06-02 RX ORDER — MORPHINE SULFATE 15 MG/1
7.5-15 TABLET ORAL EVERY 4 HOURS PRN
Qty: 19 TABLET | Refills: 0 | Status: SHIPPED | OUTPATIENT
Start: 2023-06-02 | End: 2023-06-05

## 2023-06-02 RX ORDER — NAPROXEN 500 MG/1
500 TABLET ORAL 2 TIMES DAILY WITH MEALS
Qty: 28 TABLET | Refills: 0 | Status: SHIPPED | OUTPATIENT
Start: 2023-06-02 | End: 2023-06-16

## 2023-06-02 RX ORDER — ONDANSETRON 2 MG/ML
4 INJECTION INTRAMUSCULAR; INTRAVENOUS
Status: COMPLETED | OUTPATIENT
Start: 2023-06-02 | End: 2023-06-02

## 2023-06-02 RX ORDER — MORPHINE SULFATE 4 MG/ML
4 INJECTION INTRAVENOUS ONCE
Status: COMPLETED | OUTPATIENT
Start: 2023-06-02 | End: 2023-06-02

## 2023-06-02 RX ORDER — PROPOFOL 10 MG/ML
160 INJECTION, EMULSION INTRAVENOUS
Status: COMPLETED | OUTPATIENT
Start: 2023-06-02 | End: 2023-06-02

## 2023-06-02 RX ORDER — METHOCARBAMOL 750 MG/1
750 TABLET, FILM COATED ORAL 4 TIMES DAILY
Qty: 40 TABLET | Refills: 0 | Status: SHIPPED | OUTPATIENT
Start: 2023-06-02 | End: 2023-06-12

## 2023-06-02 RX ADMIN — PROPOFOL 160 MG: 10 INJECTION, EMULSION INTRAVENOUS at 20:25

## 2023-06-02 RX ADMIN — MORPHINE SULFATE 4 MG: 4 INJECTION INTRAVENOUS at 20:51

## 2023-06-02 RX ADMIN — MORPHINE SULFATE 4 MG: 4 INJECTION INTRAVENOUS at 19:51

## 2023-06-02 RX ADMIN — ONDANSETRON 4 MG: 2 INJECTION INTRAMUSCULAR; INTRAVENOUS at 19:50

## 2023-06-02 ASSESSMENT — LIFESTYLE VARIABLES
HOW MANY STANDARD DRINKS CONTAINING ALCOHOL DO YOU HAVE ON A TYPICAL DAY: 3 OR 4
HOW OFTEN DO YOU HAVE A DRINK CONTAINING ALCOHOL: MONTHLY OR LESS

## 2023-06-02 ASSESSMENT — PAIN DESCRIPTION - ORIENTATION: ORIENTATION: RIGHT

## 2023-06-02 ASSESSMENT — PAIN SCALES - GENERAL
PAINLEVEL_OUTOF10: 9
PAINLEVEL_OUTOF10: 10
PAINLEVEL_OUTOF10: 10

## 2023-06-02 ASSESSMENT — PAIN DESCRIPTION - LOCATION: LOCATION: ARM;WRIST

## 2023-06-02 NOTE — ED TRIAGE NOTES
Pt arrives ambulatory with c/o falling from standing onto ground in her yard. States she was pulling weeds and she fell onto her right side. States she has pain from right elbow to her right hand. States most of pain is at right wrist. Denies LOC or taking blood thinner.

## 2023-06-03 NOTE — DISCHARGE INSTRUCTIONS
Wear sling at all times  Keep your arm propped and elevate it is much as possible  Apply ice to the wrist    Call the orthopedic doctors Monday to schedule follow-up appointment    As we discussed, I will ask the case management team to reach out to you on Monday to ensure that you have appropriate follow-up.   Both of your injuries tonight will need an orthopedist to follow closely to make sure there is no complications and proper healing    Return to ER for any worsening symptoms or new problems which may arise

## 2023-06-03 NOTE — ED PROVIDER NOTES
Emergency Department Provider Note       PCP: PROVIDER UNKNOWN, RUSH   Age: 62 y.o. Sex: female     DISPOSITION Decision To Discharge 06/02/2023 09:19:54 PM       ICD-10-CM    1. Closed anterior dislocation of right elbow, initial encounter  S53.114A morphine (MSIR) 15 MG tablet     [facility] and [place], [place]      2. Closed fracture of distal end of right radius, unspecified fracture morphology, initial encounter  S52.501A morphine (MSIR) 15 MG tablet     [address] [place] [address], [address] [place] [address] Decision Making     Complexity of Problems Addressed:  Complexity of Problem: 2 acute complicated injuries    Data Reviewed and Analyzed:  Category 1:   I independently ordered and reviewed each unique test.  I reviewed external records: provider visit note from outside specialist.       Category 2:   I interpreted the X-rays. Right wrist imaging reveals mildly displaced distal radius fracture. Pre and postreduction images of the right elbow revealed anterior dislocation which was reduced to anatomic position on the postreduction image    Category 3: Discussion of management or test interpretation. 57-year-old female patient presents with right elbow and wrist pain  Patient states she was in her yard when she tripped and fell on outstretched right hand  Imaging confirms right elbow dislocation and right radius fracture distally  Procedural sedation was performed and the elbow dislocation was reduced and confirmed with postreduction imaging  Right wrist fracture was splinted with attempts at reduction of the fracture while the patient was sedated as well.   Patient will be referred to orthopedics for outpatient follow-up  She is going to maintain the sling and sugar-tong splint until she is seen for follow-up  Return precautions discussed       Risk of Complications and/or Morbidity of Patient Management:  Prescription drug management performed

## 2023-06-03 NOTE — ED NOTES
I have reviewed discharge instructions with the patient. The patient verbalized understanding. Patient left ED via Discharge Method: wheelchair to Home with family. Opportunity for questions and clarification provided. Patient given 3 scripts. To continue your aftercare when you leave the hospital, you may receive an automated call from our care team to check in on how you are doing. This is a free service and part of our promise to provide the best care and service to meet your aftercare needs.  If you have questions, or wish to unsubscribe from this service please call [phone]. Thank you for Choosing our [facility] Emergency Department.         Donovan Galeana RN  06/02/23 8830

## 2023-06-05 ENCOUNTER — TELEPHONE (OUTPATIENT)
Dept: ORTHOPEDIC SURGERY | Age: 58
End: 2023-06-05

## 2023-06-08 ENCOUNTER — OFFICE VISIT (OUTPATIENT)
Dept: ORTHOPEDIC SURGERY | Age: 58
End: 2023-06-08

## 2023-06-08 VITALS — WEIGHT: 230 LBS | BODY MASS INDEX: 34.86 KG/M2 | HEIGHT: 68 IN

## 2023-06-08 DIAGNOSIS — S52.531A CLOSED COLLES' FRACTURE OF RIGHT RADIUS, INITIAL ENCOUNTER: Primary | ICD-10-CM

## 2023-06-08 DIAGNOSIS — S53.104A DISLOCATION OF RIGHT ELBOW, INITIAL ENCOUNTER: ICD-10-CM

## 2023-06-09 ENCOUNTER — TELEPHONE (OUTPATIENT)
Dept: ORTHOPEDIC SURGERY | Age: 58
End: 2023-06-09

## 2023-06-09 NOTE — PROGRESS NOTES
3    Orthopaedic Hand Clinic Note    Name: Lawanda Wiggins  YOB: 1965  Gender: female  MRN: [id number]      CC: Patient referred for evaluation of hand pain    HPI: Lawanda Wiggins is a 62 y.o. female with a chief complaint of right wrist  and elbow pain. The injury occurred 5 days ago when the patient fell in her garden. The pain is located diffusely about the wrist and elbow. Denies numbness or paresthesias of the fingers. Evaluation has included x-rays. Treatment to date has included splint, and closed reduction was performed on the elbow dislocation only. Denies loss of consciousness, head injury or neck pain. ROS/Meds/PSH/PMH/FH/SH: I personally reviewed the patients standard intake form. Pertinents are discussed in the HPI    Physical Examination  Musculoskeletal Exam:  Examination of the right upper extremity demonstrates no open wounds. Sensation is intact throughout, cap refill in all fingers < 5 seconds. Finger motion is limited with moderate swelling of the hand and fingers. Tenderness over right  distal radius and elbow ;equivocal tenderness over the ulnar aspect of the wrist.     Imaging / Electrodiagnostic Tests:     I independently reviewed and interpreted radiographs of the right wrist and elbow. They demonstrate posterior dislocation of the elbow with subsequent concentric reduction. There is an extraarticular distal radius fracture with dorsal comminution and dorsal angulation of 30 degrees    Assessment:     ICD-10-CM    1. Closed Colles' fracture of right radius, initial encounter  S52.531A       2. Dislocation of right elbow, initial encounter  S53.104A           Plan:   We discussed the diagnosis and different treatment options. We discussed observation, casting, further imaging, and surgical fixation and the risks, benefits and alternatives of all these options.  We discussed that because of the significant dorsal angulation of her fracture, age, and hand

## 2023-06-09 NOTE — TELEPHONE ENCOUNTER
I called and spoke with patient, got her scheduled for surgery on 6/16/2023. She states sh will call us back on Monday to let us know for sure whether or not she wants the surgery.

## 2023-06-15 PROBLEM — S52.531A FRACTURE, COLLES, RIGHT, CLOSED: Status: ACTIVE | Noted: 2023-06-15

## 2023-06-19 RX ORDER — BACLOFEN 10 MG/1
TABLET ORAL 4 TIMES DAILY PRN
COMMUNITY
Start: 2023-03-31

## 2023-06-19 RX ORDER — LIDOCAINE 50 MG/G
PATCH TOPICAL DAILY PRN
COMMUNITY
Start: 2023-03-31

## 2023-06-19 RX ORDER — IBUPROFEN 200 MG
400 TABLET ORAL AS NEEDED
COMMUNITY

## 2023-06-19 RX ORDER — MULTIVIT-MIN/FOLIC ACID/LUTEIN 400-250MCG
TABLET,CHEWABLE ORAL DAILY
COMMUNITY

## 2023-06-22 ENCOUNTER — ANESTHESIA EVENT (OUTPATIENT)
Dept: SURGERY | Age: 58
End: 2023-06-22

## 2023-06-23 ENCOUNTER — APPOINTMENT (OUTPATIENT)
Dept: GENERAL RADIOLOGY | Age: 58
End: 2023-06-23
Attending: ORTHOPAEDIC SURGERY

## 2023-06-23 ENCOUNTER — ANESTHESIA (OUTPATIENT)
Dept: SURGERY | Age: 58
End: 2023-06-23

## 2023-06-23 ENCOUNTER — HOSPITAL ENCOUNTER (OUTPATIENT)
Age: 58
Setting detail: OUTPATIENT SURGERY
Discharge: HOME | End: 2023-06-23
Attending: ORTHOPAEDIC SURGERY | Admitting: ORTHOPAEDIC SURGERY

## 2023-06-23 VITALS
TEMPERATURE: 98.1 F | OXYGEN SATURATION: 94 % | RESPIRATION RATE: 20 BRPM | BODY MASS INDEX: 34.07 KG/M2 | WEIGHT: 230 LBS | HEART RATE: 67 BPM | HEIGHT: 69 IN | DIASTOLIC BLOOD PRESSURE: 77 MMHG | SYSTOLIC BLOOD PRESSURE: 154 MMHG

## 2023-06-23 DIAGNOSIS — S52.531A CLOSED COLLES' FRACTURE OF RIGHT RADIUS, INITIAL ENCOUNTER: Primary | ICD-10-CM

## 2023-06-23 PROCEDURE — 7100000000 HC PACU RECOVERY - FIRST 15 MIN: Performed by: ORTHOPAEDIC SURGERY

## 2023-06-23 PROCEDURE — 6360000002 HC RX W HCPCS: Performed by: NURSE ANESTHETIST, CERTIFIED REGISTERED

## 2023-06-23 PROCEDURE — 7100000010 HC PHASE II RECOVERY - FIRST 15 MIN: Performed by: ORTHOPAEDIC SURGERY

## 2023-06-23 PROCEDURE — C1713 ANCHOR/SCREW BN/BN,TIS/BN: HCPCS | Performed by: ORTHOPAEDIC SURGERY

## 2023-06-23 PROCEDURE — 2580000003 HC RX 258: Performed by: ANESTHESIOLOGY

## 2023-06-23 PROCEDURE — 3700000000 HC ANESTHESIA ATTENDED CARE: Performed by: ORTHOPAEDIC SURGERY

## 2023-06-23 PROCEDURE — 3700000001 HC ADD 15 MINUTES (ANESTHESIA): Performed by: ORTHOPAEDIC SURGERY

## 2023-06-23 PROCEDURE — 2709999900 HC NON-CHARGEABLE SUPPLY: Performed by: ORTHOPAEDIC SURGERY

## 2023-06-23 PROCEDURE — 25607 OPTX DST RD XARTC FX/EPI SEP: CPT | Performed by: ORTHOPAEDIC SURGERY

## 2023-06-23 PROCEDURE — 7100000001 HC PACU RECOVERY - ADDTL 15 MIN: Performed by: ORTHOPAEDIC SURGERY

## 2023-06-23 PROCEDURE — 2500000003 HC RX 250 WO HCPCS: Performed by: NURSE ANESTHETIST, CERTIFIED REGISTERED

## 2023-06-23 PROCEDURE — 6360000002 HC RX W HCPCS: Performed by: ANESTHESIOLOGY

## 2023-06-23 PROCEDURE — 64415 NJX AA&/STRD BRCH PLXS IMG: CPT | Performed by: ANESTHESIOLOGY

## 2023-06-23 PROCEDURE — 6370000000 HC RX 637 (ALT 250 FOR IP): Performed by: ANESTHESIOLOGY

## 2023-06-23 PROCEDURE — 3600000004 HC SURGERY LEVEL 4 BASE: Performed by: ORTHOPAEDIC SURGERY

## 2023-06-23 PROCEDURE — 3600000014 HC SURGERY LEVEL 4 ADDTL 15MIN: Performed by: ORTHOPAEDIC SURGERY

## 2023-06-23 PROCEDURE — 2720000010 HC SURG SUPPLY STERILE: Performed by: ORTHOPAEDIC SURGERY

## 2023-06-23 DEVICE — PEG FIX L17MM DIA2MM DST RAD TI SMOOTH FOR VOLAR PLATING: Type: IMPLANTABLE DEVICE | Site: WRIST | Status: FUNCTIONAL

## 2023-06-23 DEVICE — K WIRE FIX L127MM DIA1.5MM DST VOLAR RAD STD TIP GEMINUS: Type: IMPLANTABLE DEVICE | Site: WRIST | Status: FUNCTIONAL

## 2023-06-23 DEVICE — SCREW BNE L12MM DIA3.5MM CORT DST VOLAR RAD TI LOK FULL: Type: IMPLANTABLE DEVICE | Site: WRIST | Status: FUNCTIONAL

## 2023-06-23 DEVICE — SCREW BNE L12MM DIA3.5MM CORT DST RAD VOLAR TI NONLOCKING: Type: IMPLANTABLE DEVICE | Site: WRIST | Status: FUNCTIONAL

## 2023-06-23 DEVICE — SCREW BNE L20MM DIA2.5MM DST RAD VOLAR CO CHROM POLYAX LOK: Type: IMPLANTABLE DEVICE | Site: WRIST | Status: FUNCTIONAL

## 2023-06-23 DEVICE — PLATE BNE 4 H R DST RAD VOLAR TI NAR FOR 3.5MM SCR GEMINUS: Type: IMPLANTABLE DEVICE | Site: WRIST | Status: FUNCTIONAL

## 2023-06-23 DEVICE — PEG BNE FIX L19MM DIA2MM DST RAD TI SMOOTH FOR VOLAR: Type: IMPLANTABLE DEVICE | Site: WRIST | Status: FUNCTIONAL

## 2023-06-23 DEVICE — SCREW BNE L18MM DIA2.5MM DST RAD VOLAR CO CHROM POLYAX LOK: Type: IMPLANTABLE DEVICE | Site: WRIST | Status: FUNCTIONAL

## 2023-06-23 DEVICE — PEG BNE FIX L20MM DIA2MM DST RAD TI SMOOTH FOR VOLAR: Type: IMPLANTABLE DEVICE | Site: WRIST | Status: FUNCTIONAL

## 2023-06-23 RX ORDER — SODIUM CHLORIDE 0.9 % (FLUSH) 0.9 %
5-40 SYRINGE (ML) INJECTION EVERY 12 HOURS SCHEDULED
Status: DISCONTINUED | OUTPATIENT
Start: 2023-06-23 | End: 2023-06-23 | Stop reason: HOSPADM

## 2023-06-23 RX ORDER — OXYCODONE HYDROCHLORIDE AND ACETAMINOPHEN 5; 325 MG/1; MG/1
1 TABLET ORAL EVERY 6 HOURS PRN
Qty: 20 TABLET | Refills: 0 | Status: SHIPPED | OUTPATIENT
Start: 2023-06-23 | End: 2023-06-30 | Stop reason: ALTCHOICE

## 2023-06-23 RX ORDER — LIDOCAINE HYDROCHLORIDE 10 MG/ML
1 INJECTION, SOLUTION INFILTRATION; PERINEURAL
Status: DISCONTINUED | OUTPATIENT
Start: 2023-06-23 | End: 2023-06-23 | Stop reason: HOSPADM

## 2023-06-23 RX ORDER — PROPOFOL 10 MG/ML
INJECTION, EMULSION INTRAVENOUS PRN
Status: DISCONTINUED | OUTPATIENT
Start: 2023-06-23 | End: 2023-06-23 | Stop reason: SDUPTHER

## 2023-06-23 RX ORDER — OXYCODONE HYDROCHLORIDE 5 MG/1
5 TABLET ORAL
Status: DISCONTINUED | OUTPATIENT
Start: 2023-06-23 | End: 2023-06-23 | Stop reason: HOSPADM

## 2023-06-23 RX ORDER — SODIUM CHLORIDE 9 MG/ML
INJECTION, SOLUTION INTRAVENOUS PRN
Status: DISCONTINUED | OUTPATIENT
Start: 2023-06-23 | End: 2023-06-23 | Stop reason: HOSPADM

## 2023-06-23 RX ORDER — HYDROMORPHONE HYDROCHLORIDE 2 MG/ML
0.5 INJECTION, SOLUTION INTRAMUSCULAR; INTRAVENOUS; SUBCUTANEOUS EVERY 5 MIN PRN
Status: DISCONTINUED | OUTPATIENT
Start: 2023-06-23 | End: 2023-06-23 | Stop reason: HOSPADM

## 2023-06-23 RX ORDER — PROPOFOL 10 MG/ML
INJECTION, EMULSION INTRAVENOUS CONTINUOUS PRN
Status: DISCONTINUED | OUTPATIENT
Start: 2023-06-23 | End: 2023-06-23 | Stop reason: SDUPTHER

## 2023-06-23 RX ORDER — MIDAZOLAM HYDROCHLORIDE 2 MG/2ML
2 INJECTION, SOLUTION INTRAMUSCULAR; INTRAVENOUS
Status: COMPLETED | OUTPATIENT
Start: 2023-06-23 | End: 2023-06-23

## 2023-06-23 RX ORDER — PROCHLORPERAZINE EDISYLATE 5 MG/ML
5 INJECTION INTRAMUSCULAR; INTRAVENOUS
Status: DISCONTINUED | OUTPATIENT
Start: 2023-06-23 | End: 2023-06-23 | Stop reason: HOSPADM

## 2023-06-23 RX ORDER — FENTANYL CITRATE 50 UG/ML
100 INJECTION, SOLUTION INTRAMUSCULAR; INTRAVENOUS
Status: COMPLETED | OUTPATIENT
Start: 2023-06-23 | End: 2023-06-23

## 2023-06-23 RX ORDER — ACETAMINOPHEN 500 MG
1000 TABLET ORAL ONCE
Status: COMPLETED | OUTPATIENT
Start: 2023-06-23 | End: 2023-06-23

## 2023-06-23 RX ORDER — ROPIVACAINE HYDROCHLORIDE 5 MG/ML
INJECTION, SOLUTION EPIDURAL; INFILTRATION; PERINEURAL
Status: COMPLETED | OUTPATIENT
Start: 2023-06-23 | End: 2023-06-23

## 2023-06-23 RX ORDER — ONDANSETRON 2 MG/ML
INJECTION INTRAMUSCULAR; INTRAVENOUS PRN
Status: DISCONTINUED | OUTPATIENT
Start: 2023-06-23 | End: 2023-06-23 | Stop reason: SDUPTHER

## 2023-06-23 RX ORDER — DEXAMETHASONE SODIUM PHOSPHATE 10 MG/ML
INJECTION, SOLUTION INTRAMUSCULAR; INTRAVENOUS
Status: COMPLETED | OUTPATIENT
Start: 2023-06-23 | End: 2023-06-23

## 2023-06-23 RX ORDER — SODIUM CHLORIDE 0.9 % (FLUSH) 0.9 %
5-40 SYRINGE (ML) INJECTION PRN
Status: DISCONTINUED | OUTPATIENT
Start: 2023-06-23 | End: 2023-06-23 | Stop reason: HOSPADM

## 2023-06-23 RX ORDER — LIDOCAINE HYDROCHLORIDE 20 MG/ML
INJECTION, SOLUTION EPIDURAL; INFILTRATION; INTRACAUDAL; PERINEURAL PRN
Status: DISCONTINUED | OUTPATIENT
Start: 2023-06-23 | End: 2023-06-23 | Stop reason: SDUPTHER

## 2023-06-23 RX ORDER — SODIUM CHLORIDE, SODIUM LACTATE, POTASSIUM CHLORIDE, CALCIUM CHLORIDE 600; 310; 30; 20 MG/100ML; MG/100ML; MG/100ML; MG/100ML
INJECTION, SOLUTION INTRAVENOUS CONTINUOUS
Status: DISCONTINUED | OUTPATIENT
Start: 2023-06-23 | End: 2023-06-23 | Stop reason: HOSPADM

## 2023-06-23 RX ORDER — HALOPERIDOL 5 MG/ML
1 INJECTION INTRAMUSCULAR
Status: DISCONTINUED | OUTPATIENT
Start: 2023-06-23 | End: 2023-06-23 | Stop reason: HOSPADM

## 2023-06-23 RX ADMIN — FENTANYL CITRATE 50 MCG: 50 INJECTION, SOLUTION INTRAMUSCULAR; INTRAVENOUS at 08:13

## 2023-06-23 RX ADMIN — ROPIVACAINE HYDROCHLORIDE 15 ML: 5 INJECTION, SOLUTION EPIDURAL; INFILTRATION; PERINEURAL at 08:13

## 2023-06-23 RX ADMIN — ACETAMINOPHEN 1000 MG: 500 TABLET, FILM COATED ORAL at 08:09

## 2023-06-23 RX ADMIN — ONDANSETRON 4 MG: 2 INJECTION INTRAMUSCULAR; INTRAVENOUS at 09:57

## 2023-06-23 RX ADMIN — SODIUM CHLORIDE, POTASSIUM CHLORIDE, SODIUM LACTATE AND CALCIUM CHLORIDE: 600; 310; 30; 20 INJECTION, SOLUTION INTRAVENOUS at 08:10

## 2023-06-23 RX ADMIN — MIDAZOLAM 2 MG: 1 INJECTION INTRAMUSCULAR; INTRAVENOUS at 08:13

## 2023-06-23 RX ADMIN — DEXAMETHASONE SODIUM PHOSPHATE 5 MG: 10 INJECTION, SOLUTION INTRAMUSCULAR; INTRAVENOUS at 08:13

## 2023-06-23 RX ADMIN — LIDOCAINE HYDROCHLORIDE 100 MG: 20 INJECTION, SOLUTION EPIDURAL; INFILTRATION; INTRACAUDAL; PERINEURAL at 09:53

## 2023-06-23 RX ADMIN — MEPIVACAINE HYDROCHLORIDE 10 ML: 15 INJECTION, SOLUTION EPIDURAL; INFILTRATION at 08:13

## 2023-06-23 RX ADMIN — PROPOFOL 100 MCG/KG/MIN: 10 INJECTION, EMULSION INTRAVENOUS at 10:00

## 2023-06-23 RX ADMIN — PROPOFOL 50 MG: 10 INJECTION, EMULSION INTRAVENOUS at 09:53

## 2023-06-23 ASSESSMENT — PAIN DESCRIPTION - DESCRIPTORS: DESCRIPTORS: ACHING

## 2023-06-23 ASSESSMENT — PAIN - FUNCTIONAL ASSESSMENT
PAIN_FUNCTIONAL_ASSESSMENT: PREVENTS OR INTERFERES SOME ACTIVE ACTIVITIES AND ADLS
PAIN_FUNCTIONAL_ASSESSMENT: 0-10

## 2023-06-23 ASSESSMENT — PAIN SCALES - GENERAL: PAINLEVEL_OUTOF10: 0

## 2023-06-23 ASSESSMENT — LIFESTYLE VARIABLES: SMOKING_STATUS: 1

## 2023-06-23 NOTE — ANESTHESIA POSTPROCEDURE EVALUATION
Department of Anesthesiology  Postprocedure Note    Patient: Sue Osborne  MRN: [id number]  YOB: 1965  Date of evaluation: 6/23/2023      Procedure Summary     Date: 06/23/23 Room / Location: [facility] OP OR 01 / [facility]    Anesthesia Start: 8598 Anesthesia Stop: 1055    Procedure: Open reduction, internal fixation right distal radius fracture (Right: Wrist) Diagnosis:       Fracture, Colles, right, closed      (Fracture, Colles, right, closed [S52.531A])    Surgeons: Radha Melendez MD Responsible Provider: Nikos Almaguer MD    Anesthesia Type: TIVA, Regional ASA Status: 3          Anesthesia Type: TIVA, Regional    Nini Phase I: Nini Score: 10    Nini Phase II:        Anesthesia Post Evaluation    Patient location during evaluation: PACU  Patient participation: complete - patient participated  Level of consciousness: awake and alert  Pain score: 1  Airway patency: patent  Nausea & Vomiting: no nausea  Complications: no  Cardiovascular status: blood pressure returned to baseline and hemodynamically stable  Respiratory status: acceptable  Hydration status: euvolemic  Multimodal analgesia pain management approach

## 2023-06-23 NOTE — ANESTHESIA PROCEDURE NOTES
Peripheral Block    Patient location during procedure: pre-op  Reason for block: post-op pain management and at surgeon's request  Start time: 6/23/2023 8:13 AM  End time: 6/23/2023 8:16 AM  Staffing  Anesthesiologist: David Stoner MD  Preanesthetic Checklist  Completed: patient identified, IV checked, site marked, risks and benefits discussed, surgical/procedural consents, equipment checked, pre-op evaluation, timeout performed, anesthesia consent given, oxygen available, monitors applied/VS acknowledged, fire risk safety assessment completed and verbalized and blood product R/B/A discussed and consented  Peripheral Block   Patient position: supine  Prep: ChloraPrep  Provider prep: sterile gloves and mask  Patient monitoring: responsive to questions, oxygen, IV access, frequent blood pressure checks, continuous pulse ox and cardiac monitor  Block type: Brachial plexus  Infraclavicular  Laterality: right  Injection technique: single-shot  Guidance: ultrasound guided    Needle   Needle type: insulated echogenic nerve stimulator needle   Needle gauge: 21 G  Needle localization: ultrasound guidance  Needle length: 5 cm  Assessment   Injection assessment: negative aspiration for heme, no paresthesia on injection, local visualized surrounding nerve on ultrasound, no intravascular symptoms and low pressure verified by pressure monitor  Paresthesia pain: none  Slow fractionated injection: yes  Hemodynamics: stable  Real-time US image taken/store: yes  Outcomes: uncomplicated    Medications Administered  dexamethasone (DECADRON) (PF) 10 mg/mL injection - Other   5 mg - 6/23/2023 8:13:00 AM  mepivacaine (CARBOCAINE) injection 1.5% - Perineural   10 mL - 6/23/2023 8:13:00 AM  ropivacaine (NAROPIN) injection 0.5% - Perineural   15 mL - 6/23/2023 8:13:00 AM

## 2023-06-23 NOTE — ANESTHESIA PRE PROCEDURE
Department of Anesthesiology  Preprocedure Note       Name:  Fior Puentes   Age:  62 y.o.  :  1965                                          MRN:  [id number]         Date:  2023      Surgeon: Jose Esposito):  Froylan Rai MD    Procedure: Procedure(s):  Open reduction, internal fixation right distal radius fracture    Medications prior to admission:   Prior to Admission medications    Medication Sig Start Date End Date Taking?  Authorizing Provider   Multiple Vitamins-Minerals (CENTRUM SILVER) CHEW Take by mouth daily   Yes Historical Provider, MD   Menthol, Topical Analgesic, (BIOFREEZE ROLL-ON EX) Apply topically as needed Back and neck   Yes Historical Provider, MD   ibuprofen (ADVIL;MOTRIN) 200 MG tablet Take 2 tablets by mouth as needed for Pain   Yes Historical Provider, MD   NONFORMULARY as needed CBD and delta gummies   Yes Historical Provider, MD   baclofen (LIORESAL) 10 MG tablet 4 times daily as needed Takes 4-5 times per day 3/31/23   Historical Provider, MD   lidocaine (LIDODERM) 5 % daily as needed NECK AND BACK 3/31/23   Historical Provider, MD   acetaminophen (TYLENOL) 500 MG tablet Take 500 mg by mouth every 6 hours as needed for Pain    Historical Provider, MD       Current medications:    Current Facility-Administered Medications   Medication Dose Route Frequency Provider Last Rate Last Admin    ceFAZolin (ANCEF) 2000 mg in sterile water 20 mL IV syringe  2,000 mg IntraVENous On Call to Maribell Mosqueda MD        sodium chloride flush 0.9 % injection 5-40 mL  5-40 mL IntraVENous 2 times per day Froylan Rai MD        sodium chloride flush 0.9 % injection 5-40 mL  5-40 mL IntraVENous PRN Froylan Rai MD        0.9 % sodium chloride infusion   IntraVENous PRN Froylan Rai MD        lidocaine 1 % injection 1 mL  1 mL IntraDERmal Once PRN Dl Madrid MD        lactated ringers IV soln infusion   IntraVENous Continuous Dl Madrid  mL/hr at 23

## 2023-06-23 NOTE — PROGRESS NOTES
Spiritual Care visit. Initial Visit, Pre Surgery Consult. Visit and prayer before patient goes to surgery.     Visit by Alen Lesches, M.Ed., Th.B. ,Staff

## 2023-06-23 NOTE — OP NOTE
OPERATIVE NOTE    Leon Benitez   [id number]  [unfilled]    PRE-OP DIAGNOSIS: Fracture, Colles, right, closed [S52.181X]       POST-OP DIAGNOSIS: * No post-op diagnosis entered *    LATERALITY: Right    PROCEDURES PERFORMED:  Open treatment of Right two part extra-articular distal radius fracture with internal fixation CPT 56593      SURGEON:  Macy Foster MD    IMPLANTS:  Implant Name Type Inv.  Item Serial No.  Lot No. LRB No. Used Action   K WIRE FIX L127MM DIA1.5MM DST VOLAR RAD STD TIP GEMINUS - [id number]  K WIRE FIX L127MM DIA1.5MM DST VOLAR RAD STD TIP GEMINUS  [employer]KELET[employer] [facility] [id number] Right 5 Implanted   PLATE BNE 4 H R DST RAD VOLAR TI KERI FOR 3.5MM SCR GEMINUS - [id number]  PLATE BNE 4 H R DST RAD VOLAR TI KERI FOR 3.5MM SCR GEMINUS  [employer] [facility] [id number] Right 1 Implanted   SCREW BNE L12MM DIA3.5MM AYSE DST RAD VOLAR TI NONLOCKING - [id number]  SCREW BNE L12MM DIA3.5MM AYSE DST RAD VOLAR TI NONLOCKING  [employer] [facility] [id number] Right 1 Implanted   PEG BNE FIX L20MM DIA2MM DST RAD TI SMOOTH FOR VOLAR - [id number]  PEG BNE FIX L20MM DIA2MM DST RAD TI SMOOTH FOR VOLAR  [employer] [facility] [id number] Right 3 Implanted   PEG BNE FIX L19MM DIA2MM DST RAD TI SMOOTH FOR VOLAR - [id number]  PEG BNE FIX L19MM DIA2MM DST RAD TI SMOOTH FOR VOLAR  [employer] [facility] [id number] Right 1 Implanted   PEG FIX L17MM DIA2MM DST RAD TI SMOOTH FOR VOLAR PLATING - [id number]  PEG FIX L17MM DIA2MM DST RAD TI SMOOTH FOR VOLAR PLATING  [employer] [facility] [id number] Right 1 Implanted   SCREW BNE L18MM DIA2.5MM DST RAD VOLAR CO CHROM POLYAX DESTINI - [id number]  SCREW BNE L18MM DIA2.5MM DST RAD VOLAR CO CHROM POLYAX DESTINI  [employer] [facility] [id number] Right 1 Implanted   SCREW BNE L20MM DIA2.5MM DST RAD VOLAR CO CHROM POLYAX DESTINI - [id number]  SCREW BNE L20MM DIA2.5MM DST RAD VOLAR CO CHROM POLYAX DESTINI  SKELETAL DYNAMICS [facility] [id number] Right 1 Implanted

## 2023-06-29 DIAGNOSIS — S52.531A CLOSED COLLES' FRACTURE OF RIGHT RADIUS, INITIAL ENCOUNTER: Primary | ICD-10-CM

## 2023-06-30 ENCOUNTER — TELEPHONE (OUTPATIENT)
Dept: ORTHOPEDIC SURGERY | Age: 58
End: 2023-06-30

## 2023-06-30 DIAGNOSIS — S52.531A CLOSED COLLES' FRACTURE OF RIGHT RADIUS, INITIAL ENCOUNTER: Primary | ICD-10-CM

## 2023-06-30 RX ORDER — OXYCODONE HYDROCHLORIDE AND ACETAMINOPHEN 5; 325 MG/1; MG/1
1 TABLET ORAL EVERY 6 HOURS PRN
Qty: 15 TABLET | Refills: 0 | Status: SHIPPED | OUTPATIENT
Start: 2023-06-30 | End: 2023-07-07

## 2023-07-06 ENCOUNTER — EVALUATION (OUTPATIENT)
Age: 58
End: 2023-07-06

## 2023-07-06 ENCOUNTER — OFFICE VISIT (OUTPATIENT)
Dept: ORTHOPEDIC SURGERY | Age: 58
End: 2023-07-06

## 2023-07-06 DIAGNOSIS — M25.641 STIFFNESS OF FINGER JOINT OF RIGHT HAND: ICD-10-CM

## 2023-07-06 DIAGNOSIS — M25.631 STIFFNESS OF RIGHT WRIST JOINT: ICD-10-CM

## 2023-07-06 DIAGNOSIS — R29.898 UPPER EXTREMITY WEAKNESS: ICD-10-CM

## 2023-07-06 DIAGNOSIS — S53.114A CLOSED ANTERIOR DISLOCATION OF RIGHT ELBOW, INITIAL ENCOUNTER: ICD-10-CM

## 2023-07-06 DIAGNOSIS — Z78.9 DECREASED ACTIVITIES OF DAILY LIVING (ADL): ICD-10-CM

## 2023-07-06 DIAGNOSIS — S52.531A CLOSED COLLES' FRACTURE OF RIGHT RADIUS, INITIAL ENCOUNTER: Primary | ICD-10-CM

## 2023-07-06 DIAGNOSIS — S53.104A DISLOCATION OF RIGHT ELBOW, INITIAL ENCOUNTER: ICD-10-CM

## 2023-07-06 DIAGNOSIS — M25.521 RIGHT ELBOW PAIN: ICD-10-CM

## 2023-07-06 DIAGNOSIS — M25.531 RIGHT WRIST PAIN: ICD-10-CM

## 2023-07-06 DIAGNOSIS — M25.621 STIFFNESS OF RIGHT ELBOW JOINT: ICD-10-CM

## 2023-07-06 NOTE — PROGRESS NOTES
[address] [place], [place] [address]ice [facility] [address]  Dept: [phone]      Occupational Therapy Initial Assessment     Referring MD: Barbi Lobo MD    Diagnosis:     ICD-10-CM    1. Closed Colles' fracture of right radius, initial encounter  S52.531A       2. Closed anterior dislocation of right elbow, initial encounter  S53.114A       3. Stiffness of finger joint of right hand  M25.641       4. Stiffness of right wrist joint  M25.631       5. Stiffness of right elbow joint  M25.621       6. Right wrist pain  M25.531       7. Right elbow pain  M25.521       8. Decreased activities of daily living (ADL)  Z78.9       9.  Upper extremity weakness  R29.898            Surgery/Medical Dx: Irma Santillan  6/23/23     Therapy precautions: Fracture precautions    History of injury/onset :   DOI   6/2/23  Right distal radius fracture/  Closed Anterior Dislocation ot Right elbow    DOS 6/23/23   ORIF R distal radius fracture      Total Direct Treatment Time: 25 min  Total In Office Time: 50 min  Modifier needed: No  Episode visit count:  1     Preferred Name: Genoveva Mccrary HISTORY     PMHX & Meds:   Past Medical History:   Diagnosis Date    Spinal stenosis of lumbar region with neurogenic claudication 2022    epidural injections   ,   Past Surgical History:   Procedure Laterality Date    BUNIONECTOMY Left     FOREARM SURGERY Right 6/23/2023    Open reduction, internal fixation right distal radius fracture performed by Barbi Lobo MD at [facility]      Medications. : Reviewed in chart  Allergies: No Known Allergies     SUBJECTIVE     Current Symptoms/Chief complaints:   Chief Complaint   Patient presents with    Wrist Pain       Chief complaint/history of injury:   Date symptoms began: 6/3/23  Fabricio Martinez of condition:Recent onset (initial onset within last 3 months)  Primary cause of current episode: Traumatic  How did symptoms start: fell in her garden  Describe current symptoms: R wrist/finger/elbow

## 2023-07-06 NOTE — PROGRESS NOTES
Orthopaedic Hand Surgery Note    Name: Pola Peralta  Age: 62 y.o. YOB: 1965  Gender: female  MRN: [id number]    Post Operative Visit: Open reduction, internal fixation right distal radius fracture - Right    HPI: Patient is status post Open reduction, internal fixation right distal radius fracture - Right on 6/23/2023. Doing well. Pleased with current progress. Denies fevers or chills. Physical Examination:  Wound healing well. Sensation is intact in all fingers. Motor exam reveals no deficits. limited finger range of motion. Limited wrist motion due to pain. Limited elbow motion due to pain    Imaging:     Wrist  XR: AP, Lateral, Oblique and wrist facet view     Clinical Indication:  1. Closed Colles' fracture of right radius, initial encounter    2. Dislocation of right elbow, initial encounter         Report: AP, lateral, oblique and wrist facet x-ray on the right demonstrates distal radius fracture status post plate and screw osteosynthesis in acceptable alignment, no hardware complication is noted    Impression: Distal radius fracture status post osteosynthesis as described above     Krysta Sharp MD    Elbow  XR: AP, Lateral, Oblique views     Clinical Indication:    ICD-10-CM    1. Closed Colles' fracture of right radius, initial encounter  S52.531A XR WRIST RIGHT (MIN 3 VIEWS)      2. Dislocation of right elbow, initial encounter  S53.104A XR ELBOW RIGHT (MIN 3 VIEWS)             Report: AP, lateral, oblique x-ray of the right elbow demonstrates concentric reduction of the elbow joint. No fractures identified       Impression: as above     Krysta Sharp MD          Assessment:   1. Closed Colles' fracture of right radius, initial encounter    2. Dislocation of right elbow, initial encounter         Status post Open reduction, internal fixation right distal radius fracture - Right on 6/23/2023    Plan:  We discussed the treatment and therapy plan.  Patient will start

## 2023-07-10 ENCOUNTER — TREATMENT (OUTPATIENT)
Age: 58
End: 2023-07-10

## 2023-07-10 DIAGNOSIS — S52.531A CLOSED COLLES' FRACTURE OF RIGHT RADIUS, INITIAL ENCOUNTER: Primary | ICD-10-CM

## 2023-07-10 DIAGNOSIS — S53.114A CLOSED ANTERIOR DISLOCATION OF RIGHT ELBOW, INITIAL ENCOUNTER: ICD-10-CM

## 2023-07-10 DIAGNOSIS — M25.621 STIFFNESS OF RIGHT ELBOW JOINT: ICD-10-CM

## 2023-07-10 DIAGNOSIS — M25.631 STIFFNESS OF RIGHT WRIST JOINT: ICD-10-CM

## 2023-07-10 DIAGNOSIS — M25.641 STIFFNESS OF FINGER JOINT OF RIGHT HAND: ICD-10-CM

## 2023-07-10 PROCEDURE — 97110 THERAPEUTIC EXERCISES: CPT

## 2023-07-10 PROCEDURE — 97022 WHIRLPOOL THERAPY: CPT

## 2023-07-13 ENCOUNTER — TREATMENT (OUTPATIENT)
Age: 58
End: 2023-07-13

## 2023-07-13 DIAGNOSIS — Z78.9 DECREASED ACTIVITIES OF DAILY LIVING (ADL): ICD-10-CM

## 2023-07-13 DIAGNOSIS — R29.898 UPPER EXTREMITY WEAKNESS: ICD-10-CM

## 2023-07-13 DIAGNOSIS — M25.631 STIFFNESS OF RIGHT WRIST JOINT: ICD-10-CM

## 2023-07-13 DIAGNOSIS — M25.521 RIGHT ELBOW PAIN: ICD-10-CM

## 2023-07-13 DIAGNOSIS — M25.531 RIGHT WRIST PAIN: ICD-10-CM

## 2023-07-13 DIAGNOSIS — M25.621 STIFFNESS OF RIGHT ELBOW JOINT: ICD-10-CM

## 2023-07-13 DIAGNOSIS — M25.641 STIFFNESS OF FINGER JOINT OF RIGHT HAND: ICD-10-CM

## 2023-07-13 DIAGNOSIS — S52.531A CLOSED COLLES' FRACTURE OF RIGHT RADIUS, INITIAL ENCOUNTER: Primary | ICD-10-CM

## 2023-07-13 DIAGNOSIS — S53.114A CLOSED ANTERIOR DISLOCATION OF RIGHT ELBOW, INITIAL ENCOUNTER: ICD-10-CM

## 2023-07-13 RX ORDER — METHYLPREDNISOLONE 4 MG/1
TABLET ORAL
Qty: 1 KIT | Refills: 0 | Status: SHIPPED | OUTPATIENT
Start: 2023-07-13

## 2023-07-17 ENCOUNTER — TREATMENT (OUTPATIENT)
Age: 58
End: 2023-07-17

## 2023-07-17 DIAGNOSIS — M25.621 STIFFNESS OF RIGHT ELBOW JOINT: ICD-10-CM

## 2023-07-17 DIAGNOSIS — M25.521 RIGHT ELBOW PAIN: ICD-10-CM

## 2023-07-17 DIAGNOSIS — R29.898 UPPER EXTREMITY WEAKNESS: ICD-10-CM

## 2023-07-17 DIAGNOSIS — M25.641 STIFFNESS OF FINGER JOINT OF RIGHT HAND: Primary | ICD-10-CM

## 2023-07-17 DIAGNOSIS — Z78.9 DECREASED ACTIVITIES OF DAILY LIVING (ADL): ICD-10-CM

## 2023-07-17 DIAGNOSIS — M25.631 STIFFNESS OF RIGHT WRIST JOINT: ICD-10-CM

## 2023-07-17 DIAGNOSIS — M25.531 RIGHT WRIST PAIN: ICD-10-CM

## 2023-07-17 PROCEDURE — 97110 THERAPEUTIC EXERCISES: CPT | Performed by: OCCUPATIONAL THERAPIST

## 2023-07-17 PROCEDURE — 97022 WHIRLPOOL THERAPY: CPT | Performed by: OCCUPATIONAL THERAPIST

## 2023-07-17 PROCEDURE — 97140 MANUAL THERAPY 1/> REGIONS: CPT | Performed by: OCCUPATIONAL THERAPIST

## 2023-07-20 ENCOUNTER — TREATMENT (OUTPATIENT)
Age: 58
End: 2023-07-20

## 2023-07-20 DIAGNOSIS — M25.521 RIGHT ELBOW PAIN: ICD-10-CM

## 2023-07-20 DIAGNOSIS — M25.641 STIFFNESS OF FINGER JOINT OF RIGHT HAND: Primary | ICD-10-CM

## 2023-07-20 DIAGNOSIS — M25.631 STIFFNESS OF RIGHT WRIST JOINT: ICD-10-CM

## 2023-07-20 DIAGNOSIS — M25.441 EFFUSION OF RIGHT HAND: ICD-10-CM

## 2023-07-20 DIAGNOSIS — M25.531 RIGHT WRIST PAIN: ICD-10-CM

## 2023-07-20 DIAGNOSIS — Z78.9 DECREASED ACTIVITIES OF DAILY LIVING (ADL): ICD-10-CM

## 2023-07-20 DIAGNOSIS — M25.621 STIFFNESS OF RIGHT ELBOW JOINT: ICD-10-CM

## 2023-07-24 ENCOUNTER — TREATMENT (OUTPATIENT)
Age: 58
End: 2023-07-24

## 2023-07-24 DIAGNOSIS — M25.631 STIFFNESS OF RIGHT WRIST JOINT: ICD-10-CM

## 2023-07-24 DIAGNOSIS — M25.521 RIGHT ELBOW PAIN: ICD-10-CM

## 2023-07-24 DIAGNOSIS — M25.621 STIFFNESS OF RIGHT ELBOW JOINT: ICD-10-CM

## 2023-07-24 DIAGNOSIS — Z78.9 DECREASED ACTIVITIES OF DAILY LIVING (ADL): ICD-10-CM

## 2023-07-24 DIAGNOSIS — M25.441 EFFUSION OF RIGHT HAND: ICD-10-CM

## 2023-07-24 DIAGNOSIS — R29.898 UPPER EXTREMITY WEAKNESS: ICD-10-CM

## 2023-07-24 DIAGNOSIS — M25.641 STIFFNESS OF FINGER JOINT OF RIGHT HAND: Primary | ICD-10-CM

## 2023-07-24 DIAGNOSIS — M25.531 RIGHT WRIST PAIN: ICD-10-CM

## 2023-07-24 PROCEDURE — 97110 THERAPEUTIC EXERCISES: CPT | Performed by: OCCUPATIONAL THERAPIST

## 2023-07-24 PROCEDURE — 97022 WHIRLPOOL THERAPY: CPT | Performed by: OCCUPATIONAL THERAPIST

## 2023-07-24 PROCEDURE — 97140 MANUAL THERAPY 1/> REGIONS: CPT | Performed by: OCCUPATIONAL THERAPIST

## 2023-07-24 NOTE — PROGRESS NOTES
8/31/2023  (8 weeks)  Pt will demonstrate AROM WFL in the R UE to be Ind with ADL /home management and driving  Pt will increase  strength to [facility] in order to be I with opening tight jars/containers  Pt will be able to lift and carry items (ie grocery bags, laundry basket etc) with R UE pain 2 of 10 or less. Pts Quick DASH functional assessment score will be less than 20% functional deficit  Pt will be I with HEP for ROM and strengthening as indicated at time of discharge     4Home Portal     Access Code: [email or web address]  URL: [email or web address]  Date: 07/06/2023  Prepared by: Rosemarie Romero    Exercises  - Seated Elbow Flexion and Extension AROM  - 4-5 x daily - 7 x weekly - 1 sets - 10 reps - 5 hold  - Seated Forearm Pronation and Supination AROM  - 4-5 x daily - 7 x weekly - 1 sets - 10 reps - 5 hold  - Wrist Flexion Extension AROM - Palms Down  - 4-5 x daily - 7 x weekly - 1 sets - 10 reps - 5 hold  - Wrist Extension AROM  - 4-5 x daily - 7 x weekly - 1 sets - 10 reps - 5 hold  - Seated Wrist Flexion AROM  - 4-5 x daily - 7 x weekly - 1 sets - 10 reps - 5 hold  - Seated Wrist Radial and Ulnar Deviation AROM  - 4-5 x daily - 7 x weekly - 1 sets - 10 reps - 5 hold  - Digit Flexion Extension  - 4-5 x daily - 7 x weekly - 1 sets - 10 reps - 5 hold

## 2023-07-27 ENCOUNTER — TREATMENT (OUTPATIENT)
Age: 58
End: 2023-07-27

## 2023-07-27 DIAGNOSIS — M25.441 EFFUSION OF RIGHT HAND: ICD-10-CM

## 2023-07-27 DIAGNOSIS — M25.641 STIFFNESS OF FINGER JOINT OF RIGHT HAND: Primary | ICD-10-CM

## 2023-07-27 DIAGNOSIS — Z78.9 DECREASED ACTIVITIES OF DAILY LIVING (ADL): ICD-10-CM

## 2023-07-27 DIAGNOSIS — M25.631 STIFFNESS OF RIGHT WRIST JOINT: ICD-10-CM

## 2023-07-27 DIAGNOSIS — R29.898 UPPER EXTREMITY WEAKNESS: ICD-10-CM

## 2023-07-27 DIAGNOSIS — M25.531 RIGHT WRIST PAIN: ICD-10-CM

## 2023-07-27 DIAGNOSIS — M25.621 STIFFNESS OF RIGHT ELBOW JOINT: ICD-10-CM

## 2023-07-27 DIAGNOSIS — M25.521 RIGHT ELBOW PAIN: ICD-10-CM

## 2023-07-27 PROCEDURE — 97110 THERAPEUTIC EXERCISES: CPT | Performed by: OCCUPATIONAL THERAPIST

## 2023-07-27 PROCEDURE — 97140 MANUAL THERAPY 1/> REGIONS: CPT | Performed by: OCCUPATIONAL THERAPIST

## 2023-07-31 ENCOUNTER — TREATMENT (OUTPATIENT)
Age: 58
End: 2023-07-31

## 2023-07-31 DIAGNOSIS — R29.898 UPPER EXTREMITY WEAKNESS: ICD-10-CM

## 2023-07-31 DIAGNOSIS — M25.631 STIFFNESS OF RIGHT WRIST JOINT: ICD-10-CM

## 2023-07-31 DIAGNOSIS — M25.441 EFFUSION OF RIGHT HAND: ICD-10-CM

## 2023-07-31 DIAGNOSIS — M25.521 RIGHT ELBOW PAIN: ICD-10-CM

## 2023-07-31 DIAGNOSIS — M25.531 RIGHT WRIST PAIN: ICD-10-CM

## 2023-07-31 DIAGNOSIS — M25.621 STIFFNESS OF RIGHT ELBOW JOINT: ICD-10-CM

## 2023-07-31 DIAGNOSIS — M25.641 STIFFNESS OF FINGER JOINT OF RIGHT HAND: Primary | ICD-10-CM

## 2023-07-31 DIAGNOSIS — Z78.9 DECREASED ACTIVITIES OF DAILY LIVING (ADL): ICD-10-CM

## 2023-07-31 PROCEDURE — 97110 THERAPEUTIC EXERCISES: CPT | Performed by: OCCUPATIONAL THERAPIST

## 2023-07-31 PROCEDURE — 97140 MANUAL THERAPY 1/> REGIONS: CPT | Performed by: OCCUPATIONAL THERAPIST

## 2023-08-02 NOTE — PROGRESS NOTES
- 10 reps - 5 hold  - Seated Wrist Radial and Ulnar Deviation AROM  - 4-5 x daily - 7 x weekly - 1 sets - 10 reps - 5 hold  - Digit Flexion Extension  - 4-5 x daily - 7 x weekly - 1 sets - 10 reps - 5 hold

## 2023-08-03 ENCOUNTER — OFFICE VISIT (OUTPATIENT)
Dept: ORTHOPEDIC SURGERY | Age: 58
End: 2023-08-03

## 2023-08-03 ENCOUNTER — TREATMENT (OUTPATIENT)
Age: 58
End: 2023-08-03

## 2023-08-03 DIAGNOSIS — M25.521 RIGHT ELBOW PAIN: ICD-10-CM

## 2023-08-03 DIAGNOSIS — M25.621 STIFFNESS OF RIGHT ELBOW JOINT: ICD-10-CM

## 2023-08-03 DIAGNOSIS — M25.631 STIFFNESS OF RIGHT WRIST JOINT: ICD-10-CM

## 2023-08-03 DIAGNOSIS — S52.531A CLOSED COLLES' FRACTURE OF RIGHT RADIUS, INITIAL ENCOUNTER: Primary | ICD-10-CM

## 2023-08-03 DIAGNOSIS — R29.898 UPPER EXTREMITY WEAKNESS: ICD-10-CM

## 2023-08-03 DIAGNOSIS — M25.641 STIFFNESS OF FINGER JOINT OF RIGHT HAND: Primary | ICD-10-CM

## 2023-08-03 DIAGNOSIS — M25.441 EFFUSION OF RIGHT HAND: ICD-10-CM

## 2023-08-03 DIAGNOSIS — M25.531 RIGHT WRIST PAIN: ICD-10-CM

## 2023-08-03 DIAGNOSIS — Z78.9 DECREASED ACTIVITIES OF DAILY LIVING (ADL): ICD-10-CM

## 2023-08-03 DIAGNOSIS — G90.511 COMPLEX REGIONAL PAIN SYNDROME TYPE 1 OF RIGHT UPPER EXTREMITY: ICD-10-CM

## 2023-08-03 DIAGNOSIS — S53.104A DISLOCATION OF RIGHT ELBOW, INITIAL ENCOUNTER: ICD-10-CM

## 2023-08-03 PROCEDURE — 99024 POSTOP FOLLOW-UP VISIT: CPT | Performed by: ORTHOPAEDIC SURGERY

## 2023-08-03 PROCEDURE — 97110 THERAPEUTIC EXERCISES: CPT | Performed by: OCCUPATIONAL THERAPIST

## 2023-08-03 PROCEDURE — 97140 MANUAL THERAPY 1/> REGIONS: CPT | Performed by: OCCUPATIONAL THERAPIST

## 2023-08-03 PROCEDURE — 97018 PARAFFIN BATH THERAPY: CPT | Performed by: OCCUPATIONAL THERAPIST

## 2023-08-03 RX ORDER — METHYLPREDNISOLONE 4 MG/1
TABLET ORAL
Qty: 1 KIT | Refills: 0 | Status: SHIPPED | OUTPATIENT
Start: 2023-08-03

## 2023-08-03 RX ORDER — AMITRIPTYLINE HYDROCHLORIDE 25 MG/1
25 TABLET, FILM COATED ORAL NIGHTLY
Qty: 30 TABLET | Refills: 0 | Status: SHIPPED | OUTPATIENT
Start: 2023-08-03

## 2023-08-03 NOTE — PROGRESS NOTES
Orthopaedic Hand Surgery Note    Name: Noa Rhodes  Age: 62 y.o. YOB: 1965  Gender: female  MRN: [id number]    Post Operative Visit: Open reduction, internal fixation right distal radius fracture - Right    HPI: Patient is status post Open reduction, internal fixation right distal radius fracture - Right on 6/23/2023. She is having difficulty wearing the elbow extension splint. She complains of burning pain and a sensation like a rash on the wrist.    Physical Examination:  Wound healing well. Sensation is intact in all fingers. Motor exam reveals no deficits. Finger, wrist, and elbow range of motion are extremely limited. There is diffuse swelling of the hand and digits. Hand is slightly warmer and pinker than contralateral side. Imaging:     Wrist  XR: AP, Lateral, Oblique and wrist facet view     Clinical Indication:  1. Closed Colles' fracture of right radius, initial encounter    2. Dislocation of right elbow, initial encounter    3. Complex regional pain syndrome type 1 of right upper extremity         Report: AP, lateral, oblique and wrist facet x-ray on the right demonstrates distal radius fracture status post plate and screw osteosynthesis in acceptable alignment, no hardware complication is noted    Impression: Distal radius fracture status post osteosynthesis as described above     Candis Jaimes MD    Elbow  XR: AP, Lateral, Oblique views     Clinical Indication:    ICD-10-CM    1. Closed Colles' fracture of right radius, initial encounter  S52.531A XR WRIST RIGHT (MIN 3 VIEWS)     methylPREDNISolone (MEDROL DOSEPACK) 4 MG tablet     amitriptyline (ELAVIL) 25 MG tablet     Amb External Referral To Pain Medicine     CANCELED: Amb External Referral To Pain Medicine      2.  Dislocation of right elbow, initial encounter  S53.104A methylPREDNISolone (MEDROL DOSEPACK) 4 MG tablet     amitriptyline (ELAVIL) 25 MG tablet     Amb External Referral To Pain Medicine     CANCELED:

## 2023-08-04 ENCOUNTER — CLINICAL DOCUMENTATION (OUTPATIENT)
Dept: ORTHOPEDIC SURGERY | Age: 58
End: 2023-08-04

## 2023-08-04 NOTE — PROGRESS NOTES
precautions. (Met)  No pain at rest to R hand/wrist (not met, continue until 8/31/23)  Decrease edema R hand/wrist to minimal (not met, moderate, continue until 8/31/23)  Increase Active R finger flexion to within 1 cm of DPC and thumb flex to base of SF to increase ease with gripping utensils/ADL items (not met, continue until 8/31/23)  Increase R active WE/WF to 50 deg or > to increase ease with grooming/bathing (not met, continue until 8/31/23)  Increase R act Sup/pro to 70 deg or > to increase ease with washing face/turning doorknobs (not met, continue until 8/31/23)  Increase R act elbow ext to -15 to increase ease with donning shirt (not met, continue until 8/31/23)  Increase R act elbow flex to within 15 to 20 deg of L  (not met, continue until 8/31/23)  Improve Quick DASH functional assessment score to less than 40% deficit  (not met, 86%, continue until 8/31/23)    Long Term Goals: 10/26/2023  (16 weeks)  Pt will demonstrate AROM WFL in the R UE to be Ind with ADL /home management and driving  Pt will increase  strength to [facility] in order to be I with opening tight jars/containers  Pt will be able to lift and carry items (ie grocery bags, laundry basket etc) with R UE pain 2 of 10 or less. Pts Quick DASH functional assessment score will be less than 20% functional deficit  Pt will be I with [facility] for ROM and strengthening as indicated at time of discharge     [employer] Portal     Access Code: [email or web address]  URL: [email or web address]  Date: 07/06/2023  Prepared by: Rosita Romero    Exercises  - Seated Elbow Flexion and Extension AROM  - 4-5 x daily - 7 x weekly - 1 sets - 10 reps - 5 hold  - Seated Forearm Pronation and Supination AROM  - 4-5 x daily - 7 x weekly - 1 sets - 10 reps - 5 hold  - Wrist Flexion Extension AROM - Palms Down  - 4-5 x daily - 7 x weekly - 1 sets - 10 reps - 5 hold  - Wrist Extension AROM  - 4-5 x daily - 7 x weekly - 1 sets - 10 reps - 5 hold  - Seated Wrist Flexion

## 2023-08-07 ENCOUNTER — TREATMENT (OUTPATIENT)
Age: 58
End: 2023-08-07

## 2023-08-07 DIAGNOSIS — M25.521 RIGHT ELBOW PAIN: ICD-10-CM

## 2023-08-07 DIAGNOSIS — M25.641 STIFFNESS OF FINGER JOINT OF RIGHT HAND: Primary | ICD-10-CM

## 2023-08-07 DIAGNOSIS — M25.631 STIFFNESS OF RIGHT WRIST JOINT: ICD-10-CM

## 2023-08-07 DIAGNOSIS — M25.621 STIFFNESS OF RIGHT ELBOW JOINT: ICD-10-CM

## 2023-08-07 DIAGNOSIS — M25.531 RIGHT WRIST PAIN: ICD-10-CM

## 2023-08-07 DIAGNOSIS — Z78.9 DECREASED ACTIVITIES OF DAILY LIVING (ADL): ICD-10-CM

## 2023-08-07 DIAGNOSIS — M25.441 EFFUSION OF RIGHT HAND: ICD-10-CM

## 2023-08-07 PROCEDURE — 97140 MANUAL THERAPY 1/> REGIONS: CPT | Performed by: OCCUPATIONAL THERAPIST

## 2023-08-07 PROCEDURE — 97110 THERAPEUTIC EXERCISES: CPT | Performed by: OCCUPATIONAL THERAPIST

## 2023-08-07 PROCEDURE — 97018 PARAFFIN BATH THERAPY: CPT | Performed by: OCCUPATIONAL THERAPIST

## 2023-08-10 ENCOUNTER — TREATMENT (OUTPATIENT)
Age: 58
End: 2023-08-10

## 2023-08-10 DIAGNOSIS — M25.621 STIFFNESS OF RIGHT ELBOW JOINT: ICD-10-CM

## 2023-08-10 DIAGNOSIS — M25.521 RIGHT ELBOW PAIN: ICD-10-CM

## 2023-08-10 DIAGNOSIS — Z78.9 DECREASED ACTIVITIES OF DAILY LIVING (ADL): ICD-10-CM

## 2023-08-10 DIAGNOSIS — R29.898 UPPER EXTREMITY WEAKNESS: ICD-10-CM

## 2023-08-10 DIAGNOSIS — M25.441 EFFUSION OF RIGHT HAND: ICD-10-CM

## 2023-08-10 DIAGNOSIS — M25.531 RIGHT WRIST PAIN: ICD-10-CM

## 2023-08-10 DIAGNOSIS — M25.641 STIFFNESS OF FINGER JOINT OF RIGHT HAND: Primary | ICD-10-CM

## 2023-08-10 DIAGNOSIS — M25.631 STIFFNESS OF RIGHT WRIST JOINT: ICD-10-CM

## 2023-08-10 PROCEDURE — 97018 PARAFFIN BATH THERAPY: CPT | Performed by: OCCUPATIONAL THERAPIST

## 2023-08-10 PROCEDURE — 97140 MANUAL THERAPY 1/> REGIONS: CPT | Performed by: OCCUPATIONAL THERAPIST

## 2023-08-10 PROCEDURE — 97110 THERAPEUTIC EXERCISES: CPT | Performed by: OCCUPATIONAL THERAPIST

## 2023-08-14 ENCOUNTER — TREATMENT (OUTPATIENT)
Age: 58
End: 2023-08-14

## 2023-08-14 DIAGNOSIS — M25.521 RIGHT ELBOW PAIN: ICD-10-CM

## 2023-08-14 DIAGNOSIS — M25.641 STIFFNESS OF FINGER JOINT OF RIGHT HAND: Primary | ICD-10-CM

## 2023-08-14 DIAGNOSIS — M25.631 STIFFNESS OF RIGHT WRIST JOINT: ICD-10-CM

## 2023-08-14 DIAGNOSIS — M25.621 STIFFNESS OF RIGHT ELBOW JOINT: ICD-10-CM

## 2023-08-14 DIAGNOSIS — M25.531 RIGHT WRIST PAIN: ICD-10-CM

## 2023-08-14 DIAGNOSIS — M25.441 EFFUSION OF RIGHT HAND: ICD-10-CM

## 2023-08-14 DIAGNOSIS — Z78.9 DECREASED ACTIVITIES OF DAILY LIVING (ADL): ICD-10-CM

## 2023-08-14 PROCEDURE — 97018 PARAFFIN BATH THERAPY: CPT | Performed by: OCCUPATIONAL THERAPIST

## 2023-08-14 PROCEDURE — 97140 MANUAL THERAPY 1/> REGIONS: CPT | Performed by: OCCUPATIONAL THERAPIST

## 2023-08-14 PROCEDURE — 97110 THERAPEUTIC EXERCISES: CPT | Performed by: OCCUPATIONAL THERAPIST

## 2023-08-17 ENCOUNTER — TREATMENT (OUTPATIENT)
Age: 58
End: 2023-08-17

## 2023-08-17 DIAGNOSIS — M25.531 RIGHT WRIST PAIN: ICD-10-CM

## 2023-08-17 DIAGNOSIS — M25.631 STIFFNESS OF RIGHT WRIST JOINT: ICD-10-CM

## 2023-08-17 DIAGNOSIS — M25.641 STIFFNESS OF FINGER JOINT OF RIGHT HAND: Primary | ICD-10-CM

## 2023-08-17 DIAGNOSIS — M25.441 EFFUSION OF RIGHT HAND: ICD-10-CM

## 2023-08-17 DIAGNOSIS — M25.621 STIFFNESS OF RIGHT ELBOW JOINT: ICD-10-CM

## 2023-08-17 DIAGNOSIS — Z78.9 DECREASED ACTIVITIES OF DAILY LIVING (ADL): ICD-10-CM

## 2023-08-17 DIAGNOSIS — M25.521 RIGHT ELBOW PAIN: ICD-10-CM

## 2023-08-21 ENCOUNTER — TREATMENT (OUTPATIENT)
Age: 58
End: 2023-08-21

## 2023-08-21 DIAGNOSIS — M25.531 RIGHT WRIST PAIN: ICD-10-CM

## 2023-08-21 DIAGNOSIS — M25.631 STIFFNESS OF RIGHT WRIST JOINT: ICD-10-CM

## 2023-08-21 DIAGNOSIS — M25.621 STIFFNESS OF RIGHT ELBOW JOINT: ICD-10-CM

## 2023-08-21 DIAGNOSIS — M25.441 EFFUSION OF RIGHT HAND: ICD-10-CM

## 2023-08-21 DIAGNOSIS — M25.641 STIFFNESS OF FINGER JOINT OF RIGHT HAND: Primary | ICD-10-CM

## 2023-08-21 DIAGNOSIS — M25.521 RIGHT ELBOW PAIN: ICD-10-CM

## 2023-08-21 DIAGNOSIS — Z78.9 DECREASED ACTIVITIES OF DAILY LIVING (ADL): ICD-10-CM

## 2023-08-21 PROCEDURE — 97110 THERAPEUTIC EXERCISES: CPT | Performed by: OCCUPATIONAL THERAPIST

## 2023-08-21 PROCEDURE — 97018 PARAFFIN BATH THERAPY: CPT | Performed by: OCCUPATIONAL THERAPIST

## 2023-08-21 PROCEDURE — 97140 MANUAL THERAPY 1/> REGIONS: CPT | Performed by: OCCUPATIONAL THERAPIST

## 2023-08-24 ENCOUNTER — TREATMENT (OUTPATIENT)
Age: 58
End: 2023-08-24

## 2023-08-24 DIAGNOSIS — M25.441 EFFUSION OF RIGHT HAND: ICD-10-CM

## 2023-08-24 DIAGNOSIS — Z78.9 DECREASED ACTIVITIES OF DAILY LIVING (ADL): ICD-10-CM

## 2023-08-24 DIAGNOSIS — M25.631 STIFFNESS OF RIGHT WRIST JOINT: ICD-10-CM

## 2023-08-24 DIAGNOSIS — R29.898 UPPER EXTREMITY WEAKNESS: ICD-10-CM

## 2023-08-24 DIAGNOSIS — M25.621 STIFFNESS OF RIGHT ELBOW JOINT: ICD-10-CM

## 2023-08-24 DIAGNOSIS — M25.641 STIFFNESS OF FINGER JOINT OF RIGHT HAND: Primary | ICD-10-CM

## 2023-08-24 DIAGNOSIS — M25.521 RIGHT ELBOW PAIN: ICD-10-CM

## 2023-08-24 DIAGNOSIS — M25.531 RIGHT WRIST PAIN: ICD-10-CM

## 2023-08-28 ENCOUNTER — TREATMENT (OUTPATIENT)
Age: 58
End: 2023-08-28

## 2023-08-28 DIAGNOSIS — M25.521 RIGHT ELBOW PAIN: ICD-10-CM

## 2023-08-28 DIAGNOSIS — M25.621 STIFFNESS OF RIGHT ELBOW JOINT: ICD-10-CM

## 2023-08-28 DIAGNOSIS — Z78.9 DECREASED ACTIVITIES OF DAILY LIVING (ADL): ICD-10-CM

## 2023-08-28 DIAGNOSIS — M25.631 STIFFNESS OF RIGHT WRIST JOINT: ICD-10-CM

## 2023-08-28 DIAGNOSIS — M25.641 STIFFNESS OF FINGER JOINT OF RIGHT HAND: Primary | ICD-10-CM

## 2023-08-28 DIAGNOSIS — M25.531 RIGHT WRIST PAIN: ICD-10-CM

## 2023-08-28 DIAGNOSIS — M25.441 EFFUSION OF RIGHT HAND: ICD-10-CM

## 2023-08-28 PROCEDURE — 97140 MANUAL THERAPY 1/> REGIONS: CPT | Performed by: OCCUPATIONAL THERAPIST

## 2023-08-28 PROCEDURE — 97018 PARAFFIN BATH THERAPY: CPT | Performed by: OCCUPATIONAL THERAPIST

## 2023-08-28 PROCEDURE — 97110 THERAPEUTIC EXERCISES: CPT | Performed by: OCCUPATIONAL THERAPIST

## 2023-08-31 ENCOUNTER — TREATMENT (OUTPATIENT)
Age: 58
End: 2023-08-31

## 2023-08-31 DIAGNOSIS — M25.441 EFFUSION OF RIGHT HAND: ICD-10-CM

## 2023-08-31 DIAGNOSIS — R29.898 UPPER EXTREMITY WEAKNESS: ICD-10-CM

## 2023-08-31 DIAGNOSIS — Z78.9 DECREASED ACTIVITIES OF DAILY LIVING (ADL): ICD-10-CM

## 2023-08-31 DIAGNOSIS — M25.621 STIFFNESS OF RIGHT ELBOW JOINT: ICD-10-CM

## 2023-08-31 DIAGNOSIS — M25.631 STIFFNESS OF RIGHT WRIST JOINT: ICD-10-CM

## 2023-08-31 DIAGNOSIS — M25.641 STIFFNESS OF FINGER JOINT OF RIGHT HAND: Primary | ICD-10-CM

## 2023-08-31 DIAGNOSIS — M25.521 RIGHT ELBOW PAIN: ICD-10-CM

## 2023-08-31 DIAGNOSIS — M25.531 RIGHT WRIST PAIN: ICD-10-CM

## 2023-08-31 PROCEDURE — 97140 MANUAL THERAPY 1/> REGIONS: CPT | Performed by: OCCUPATIONAL THERAPIST

## 2023-08-31 PROCEDURE — 97018 PARAFFIN BATH THERAPY: CPT | Performed by: OCCUPATIONAL THERAPIST

## 2023-08-31 PROCEDURE — 97110 THERAPEUTIC EXERCISES: CPT | Performed by: OCCUPATIONAL THERAPIST

## 2023-09-05 NOTE — PROGRESS NOTES
assessment score will be less than 20% functional deficit  Pt will be I with HEP for ROM and strengthening as indicated at time of discharge     [employer] Portal     Access Code: [email or web address]  URL: [email or web address]  Date: 07/06/2023  Prepared by: Ravin Clark-Allegra    Exercises  - Seated Elbow Flexion and Extension AROM  - 4-5 x daily - 7 x weekly - 1 sets - 10 reps - 5 hold  - Seated Forearm Pronation and Supination AROM  - 4-5 x daily - 7 x weekly - 1 sets - 10 reps - 5 hold  - Wrist Flexion Extension AROM - Palms Down  - 4-5 x daily - 7 x weekly - 1 sets - 10 reps - 5 hold  - Wrist Extension AROM  - 4-5 x daily - 7 x weekly - 1 sets - 10 reps - 5 hold  - Seated Wrist Flexion AROM  - 4-5 x daily - 7 x weekly - 1 sets - 10 reps - 5 hold  - Seated Wrist Radial and Ulnar Deviation AROM  - 4-5 x daily - 7 x weekly - 1 sets - 10 reps - 5 hold  - Digit Flexion Extension  - 4-5 x daily - 7 x weekly - 1 sets - 10 reps - 5 hold    Access Code: [email or web address]  URL: [email or web address]  Date: 08/31/2023  Prepared by: Alecia Mcgee    Program Notes  Squeeze putty five times, roll it out, pinch, then make a Nikolai with the putty, put all your fingers in the Nikolai and extend fingers.  Repeat this 5X    Exercises  - Putty Squeezes  - 1 x daily - 7 x weekly - 1 sets - 5 reps - 5 hold  - Rolling Putty on Table  - 1 x daily - 7 x weekly - 1 sets - 5 reps - 5 hold  - Tip Pinch with Putty  - 1 x daily - 7 x weekly - 1 sets - 5 reps - 5 hold  - Finger Exension with Putty  - 1 x daily - 7 x weekly - 1 sets - 5 reps - 5 hold

## 2023-09-06 ENCOUNTER — TREATMENT (OUTPATIENT)
Age: 58
End: 2023-09-06

## 2023-09-06 DIAGNOSIS — M25.631 STIFFNESS OF RIGHT WRIST JOINT: ICD-10-CM

## 2023-09-06 DIAGNOSIS — M25.441 EFFUSION OF RIGHT HAND: ICD-10-CM

## 2023-09-06 DIAGNOSIS — R29.898 UPPER EXTREMITY WEAKNESS: ICD-10-CM

## 2023-09-06 DIAGNOSIS — Z78.9 DECREASED ACTIVITIES OF DAILY LIVING (ADL): ICD-10-CM

## 2023-09-06 DIAGNOSIS — M25.641 STIFFNESS OF FINGER JOINT OF RIGHT HAND: Primary | ICD-10-CM

## 2023-09-06 DIAGNOSIS — M25.521 RIGHT ELBOW PAIN: ICD-10-CM

## 2023-09-06 DIAGNOSIS — M25.531 RIGHT WRIST PAIN: ICD-10-CM

## 2023-09-06 DIAGNOSIS — M25.621 STIFFNESS OF RIGHT ELBOW JOINT: ICD-10-CM

## 2023-09-06 PROCEDURE — 97022 WHIRLPOOL THERAPY: CPT | Performed by: OCCUPATIONAL THERAPIST

## 2023-09-06 PROCEDURE — 97110 THERAPEUTIC EXERCISES: CPT | Performed by: OCCUPATIONAL THERAPIST

## 2023-09-08 ENCOUNTER — TREATMENT (OUTPATIENT)
Age: 58
End: 2023-09-08

## 2023-09-08 DIAGNOSIS — M25.441 EFFUSION OF RIGHT HAND: ICD-10-CM

## 2023-09-08 DIAGNOSIS — Z78.9 DECREASED ACTIVITIES OF DAILY LIVING (ADL): ICD-10-CM

## 2023-09-08 DIAGNOSIS — M25.621 STIFFNESS OF RIGHT ELBOW JOINT: ICD-10-CM

## 2023-09-08 DIAGNOSIS — R29.898 UPPER EXTREMITY WEAKNESS: ICD-10-CM

## 2023-09-08 DIAGNOSIS — M25.641 STIFFNESS OF FINGER JOINT OF RIGHT HAND: Primary | ICD-10-CM

## 2023-09-08 DIAGNOSIS — M25.521 RIGHT ELBOW PAIN: ICD-10-CM

## 2023-09-08 DIAGNOSIS — M25.631 STIFFNESS OF RIGHT WRIST JOINT: ICD-10-CM

## 2023-09-08 DIAGNOSIS — M25.531 RIGHT WRIST PAIN: ICD-10-CM

## 2023-09-08 NOTE — PROGRESS NOTES
right hand    ASSESSMENT   ROM and function continue to improve right UE. PLAN      Continue to increase strength and ROM right UE. GOALS   Short Term Goals:  by 10/26/2023    Patient will be I with HEP and precautions. (Met)  No pain at rest to R hand/wrist (not met, continue by 10/26/23)  Decrease edema R hand/wrist to minimal (met)  Increase Active R finger flexion to within 1 cm of DPC and thumb flex to base of SF to increase ease with gripping utensils/ADL items (not met, continue until 10/26/23)  Increase R active WE to 35 deg and WF to 50 deg or > to increase ease with grooming/bathing (not met, continue until 10/26/23)  Increase R act Sup/pro to 70 deg or > to increase ease with washing face/turning doorknobs (met)  Increase R act elbow ext to -15 to increase ease with donning shirt (not met but improving, continue until 10/26/23)  Increase R act elbow flex to within 15 to 20 deg of L  (met, 128 deg right elbow flex, left 135 deg)  Improve Quick DASH functional assessment score to less than 40% deficit  (not met but improved, 59%, continue until 10/26/23)    Long Term Goals: by  11/23/2023   Pt will demonstrate AROM WFL in the R UE to be Ind with ADL /home management and driving  Pt will increase  strength to [facility] in order to be I with opening tight jars/containers  Pt will be able to lift and carry items (ie grocery bags, laundry basket etc) with R UE pain 2 of 10 or less. Pts Quick DASH functional assessment score will be less than 20% functional deficit  Pt will be I with HEP for ROM and strengthening as indicated at time of discharge     [employer] Portal     Access Code: [email or web address]  URL: [email or web address]  Date: 07/06/2023  Prepared by: Li Romero    Exercises  - Seated Elbow Flexion and Extension AROM  - 4-5 x daily - 7 x weekly - 1 sets - 10 reps - 5 hold  - Seated Forearm Pronation and Supination AROM  - 4-5 x daily - 7 x weekly - 1 sets - 10 reps - 5 hold  - Wrist

## 2023-09-11 ENCOUNTER — TREATMENT (OUTPATIENT)
Age: 58
End: 2023-09-11

## 2023-09-11 DIAGNOSIS — M25.521 RIGHT ELBOW PAIN: ICD-10-CM

## 2023-09-11 DIAGNOSIS — Z78.9 DECREASED ACTIVITIES OF DAILY LIVING (ADL): ICD-10-CM

## 2023-09-11 DIAGNOSIS — R29.898 UPPER EXTREMITY WEAKNESS: ICD-10-CM

## 2023-09-11 DIAGNOSIS — M25.531 RIGHT WRIST PAIN: ICD-10-CM

## 2023-09-11 DIAGNOSIS — M25.621 STIFFNESS OF RIGHT ELBOW JOINT: ICD-10-CM

## 2023-09-11 DIAGNOSIS — M25.631 STIFFNESS OF RIGHT WRIST JOINT: ICD-10-CM

## 2023-09-11 DIAGNOSIS — M25.641 STIFFNESS OF FINGER JOINT OF RIGHT HAND: Primary | ICD-10-CM

## 2023-09-11 DIAGNOSIS — M25.441 EFFUSION OF RIGHT HAND: ICD-10-CM

## 2023-09-11 PROCEDURE — 97110 THERAPEUTIC EXERCISES: CPT | Performed by: OCCUPATIONAL THERAPIST

## 2023-09-11 PROCEDURE — 97022 WHIRLPOOL THERAPY: CPT | Performed by: OCCUPATIONAL THERAPIST

## 2023-09-13 NOTE — PROGRESS NOTES
Pt continues to report improved function of right UE. ROM improving. Still unable to make a fist. Very compliant. PLAN      Continue to increase strength and ROM right UE. GOALS   Short Term Goals:  by 10/26/2023    Patient will be I with HEP and precautions. (Met)  No pain at rest to R hand/wrist (not met, continue by 10/26/23)  Decrease edema R hand/wrist to minimal (met)  Increase Active R finger flexion to within 1 cm of DPC and thumb flex to base of SF to increase ease with gripping utensils/ADL items (not met, continue until 10/26/23)  Increase R active WE to 35 deg and WF to 50 deg or > to increase ease with grooming/bathing (not met, continue until 10/26/23)  Increase R act Sup/pro to 70 deg or > to increase ease with washing face/turning doorknobs (met)  Increase R act elbow ext to -15 to increase ease with donning shirt (not met but improving, continue until 10/26/23)  Increase R act elbow flex to within 15 to 20 deg of L  (met, 128 deg right elbow flex, left 135 deg)  Improve Quick DASH functional assessment score to less than 40% deficit  (not met but improved, 59%, continue until 10/26/23)    Long Term Goals: by  11/23/2023   Pt will demonstrate AROM WFL in the R UE to be Ind with ADL /home management and driving  Pt will increase  strength to [facility] in order to be I with opening tight jars/containers  Pt will be able to lift and carry items (ie grocery bags, laundry basket etc) with R UE pain 2 of 10 or less. Pts Quick DASH functional assessment score will be less than 20% functional deficit  Pt will be I with HEP for ROM and strengthening as indicated at time of discharge     [employer] Portal     Access Code: [email or web address]  URL: [email or web address]  Date: 07/06/2023  Prepared by: Agustin Millard    Exercises  - Seated Elbow Flexion and Extension AROM  - 4-5 x daily - 7 x weekly - 1 sets - 10 reps - 5 hold  - Seated Forearm Pronation and Supination AROM  - 4-5 x daily - 7 x

## 2023-09-14 ENCOUNTER — TREATMENT (OUTPATIENT)
Age: 58
End: 2023-09-14

## 2023-09-14 DIAGNOSIS — M25.441 EFFUSION OF RIGHT HAND: ICD-10-CM

## 2023-09-14 DIAGNOSIS — M25.531 RIGHT WRIST PAIN: ICD-10-CM

## 2023-09-14 DIAGNOSIS — M25.521 RIGHT ELBOW PAIN: ICD-10-CM

## 2023-09-14 DIAGNOSIS — Z78.9 DECREASED ACTIVITIES OF DAILY LIVING (ADL): ICD-10-CM

## 2023-09-14 DIAGNOSIS — M25.621 STIFFNESS OF RIGHT ELBOW JOINT: ICD-10-CM

## 2023-09-14 DIAGNOSIS — M25.641 STIFFNESS OF FINGER JOINT OF RIGHT HAND: Primary | ICD-10-CM

## 2023-09-14 DIAGNOSIS — R29.898 UPPER EXTREMITY WEAKNESS: ICD-10-CM

## 2023-09-14 DIAGNOSIS — M25.631 STIFFNESS OF RIGHT WRIST JOINT: ICD-10-CM

## 2023-09-18 ENCOUNTER — TREATMENT (OUTPATIENT)
Age: 58
End: 2023-09-18

## 2023-09-18 DIAGNOSIS — M25.641 STIFFNESS OF FINGER JOINT OF RIGHT HAND: Primary | ICD-10-CM

## 2023-09-18 DIAGNOSIS — M25.621 STIFFNESS OF RIGHT ELBOW JOINT: ICD-10-CM

## 2023-09-18 DIAGNOSIS — M25.521 RIGHT ELBOW PAIN: ICD-10-CM

## 2023-09-18 DIAGNOSIS — Z78.9 DECREASED ACTIVITIES OF DAILY LIVING (ADL): ICD-10-CM

## 2023-09-18 DIAGNOSIS — M25.441 EFFUSION OF RIGHT HAND: ICD-10-CM

## 2023-09-18 DIAGNOSIS — M25.531 RIGHT WRIST PAIN: ICD-10-CM

## 2023-09-18 DIAGNOSIS — M25.631 STIFFNESS OF RIGHT WRIST JOINT: ICD-10-CM

## 2023-09-18 PROCEDURE — 97022 WHIRLPOOL THERAPY: CPT | Performed by: OCCUPATIONAL THERAPIST

## 2023-09-18 PROCEDURE — 97110 THERAPEUTIC EXERCISES: CPT | Performed by: OCCUPATIONAL THERAPIST

## 2023-09-21 ENCOUNTER — TREATMENT (OUTPATIENT)
Age: 58
End: 2023-09-21

## 2023-09-21 DIAGNOSIS — M25.621 STIFFNESS OF RIGHT ELBOW JOINT: ICD-10-CM

## 2023-09-21 DIAGNOSIS — M25.531 RIGHT WRIST PAIN: ICD-10-CM

## 2023-09-21 DIAGNOSIS — Z78.9 DECREASED ACTIVITIES OF DAILY LIVING (ADL): ICD-10-CM

## 2023-09-21 DIAGNOSIS — M25.521 RIGHT ELBOW PAIN: ICD-10-CM

## 2023-09-21 DIAGNOSIS — R29.898 UPPER EXTREMITY WEAKNESS: ICD-10-CM

## 2023-09-21 DIAGNOSIS — M25.641 STIFFNESS OF FINGER JOINT OF RIGHT HAND: Primary | ICD-10-CM

## 2023-09-21 DIAGNOSIS — M25.631 STIFFNESS OF RIGHT WRIST JOINT: ICD-10-CM

## 2023-09-28 ENCOUNTER — TREATMENT (OUTPATIENT)
Age: 58
End: 2023-09-28

## 2023-09-28 DIAGNOSIS — Z78.9 DECREASED ACTIVITIES OF DAILY LIVING (ADL): ICD-10-CM

## 2023-09-28 DIAGNOSIS — M25.531 RIGHT WRIST PAIN: ICD-10-CM

## 2023-09-28 DIAGNOSIS — M25.641 STIFFNESS OF FINGER JOINT OF RIGHT HAND: Primary | ICD-10-CM

## 2023-09-28 DIAGNOSIS — M25.631 STIFFNESS OF RIGHT WRIST JOINT: ICD-10-CM

## 2023-09-28 DIAGNOSIS — M25.621 STIFFNESS OF RIGHT ELBOW JOINT: ICD-10-CM

## 2023-09-28 PROCEDURE — 97110 THERAPEUTIC EXERCISES: CPT

## 2023-10-03 ENCOUNTER — TREATMENT (OUTPATIENT)
Age: 58
End: 2023-10-03

## 2023-10-03 DIAGNOSIS — M25.531 RIGHT WRIST PAIN: ICD-10-CM

## 2023-10-03 DIAGNOSIS — M25.441 EFFUSION OF RIGHT HAND: ICD-10-CM

## 2023-10-03 DIAGNOSIS — M25.621 STIFFNESS OF RIGHT ELBOW JOINT: ICD-10-CM

## 2023-10-03 DIAGNOSIS — R29.898 UPPER EXTREMITY WEAKNESS: ICD-10-CM

## 2023-10-03 DIAGNOSIS — M25.641 STIFFNESS OF FINGER JOINT OF RIGHT HAND: Primary | ICD-10-CM

## 2023-10-03 DIAGNOSIS — M25.521 RIGHT ELBOW PAIN: ICD-10-CM

## 2023-10-03 DIAGNOSIS — Z78.9 DECREASED ACTIVITIES OF DAILY LIVING (ADL): ICD-10-CM

## 2023-10-03 DIAGNOSIS — M25.631 STIFFNESS OF RIGHT WRIST JOINT: ICD-10-CM

## 2023-10-03 PROCEDURE — 97110 THERAPEUTIC EXERCISES: CPT | Performed by: OCCUPATIONAL THERAPIST

## 2023-10-05 ENCOUNTER — OFFICE VISIT (OUTPATIENT)
Dept: ORTHOPEDIC SURGERY | Age: 58
End: 2023-10-05

## 2023-10-05 DIAGNOSIS — G90.511 COMPLEX REGIONAL PAIN SYNDROME TYPE 1 OF RIGHT UPPER EXTREMITY: ICD-10-CM

## 2023-10-05 DIAGNOSIS — S52.531A CLOSED COLLES' FRACTURE OF RIGHT RADIUS, INITIAL ENCOUNTER: Primary | ICD-10-CM

## 2023-10-05 DIAGNOSIS — S53.104A DISLOCATION OF RIGHT ELBOW, INITIAL ENCOUNTER: ICD-10-CM

## 2023-10-05 PROCEDURE — 99213 OFFICE O/P EST LOW 20 MIN: CPT | Performed by: ORTHOPAEDIC SURGERY

## 2023-10-06 NOTE — PROGRESS NOTES
Orthopaedic Hand Surgery Note    Name: Zayda Washington  Age: 62 y.o. YOB: 1965  Gender: female  MRN: [id number]    Post Operative Visit: Open reduction, internal fixation right distal radius fracture - Right    HPI: Patient is status post Open reduction, internal fixation right distal radius fracture - Right on 6/23/2023. Pain and range of motion have been steadily improving, and burning rash like sensation has resolved    Physical Examination:  Wound healing well. Sensation is intact in all fingers. Motor exam reveals no deficits. She has near full elbow flexion and extension. She is able to perform 30 degrees of wrist flexion and extension. Finger motion is still quite limited, but improved. She has a pulp to palm distance of 2.5cm    Imaging:     Wrist  XR: AP, Lateral, Oblique and wrist facet view     Clinical Indication:  1. Closed Colles' fracture of right radius, initial encounter    2. Complex regional pain syndrome type 1 of right upper extremity    3. Dislocation of right elbow, initial encounter         Report: AP, lateral, oblique and wrist facet x-ray on the right demonstrates distal radius fracture status post plate and screw osteosynthesis in acceptable alignment, no hardware complication is noted    Impression: Distal radius fracture status post osteosynthesis as described above          Assessment:   1. Closed Colles' fracture of right radius, initial encounter    2. Complex regional pain syndrome type 1 of right upper extremity    3. Dislocation of right elbow, initial encounter         Status post Open reduction, internal fixation right distal radius fracture - Right on 6/23/2023    Plan:  We discussed the treatment and therapy plan. She has made significant improvements in her range of motion since her last visit. I have no activity restrictions for her at this time. I would like her to continue OT, with the goal of improving her finger range of motion.  She will follow

## 2023-10-09 NOTE — PROGRESS NOTES
hold  - Seated Forearm Pronation and Supination AROM  - 4-5 x daily - 7 x weekly - 1 sets - 10 reps - 5 hold  - Wrist Flexion Extension AROM - Palms Down  - 4-5 x daily - 7 x weekly - 1 sets - 10 reps - 5 hold  - Wrist Extension AROM  - 4-5 x daily - 7 x weekly - 1 sets - 10 reps - 5 hold  - Seated Wrist Flexion AROM  - 4-5 x daily - 7 x weekly - 1 sets - 10 reps - 5 hold  - Seated Wrist Radial and Ulnar Deviation AROM  - 4-5 x daily - 7 x weekly - 1 sets - 10 reps - 5 hold  - Digit Flexion Extension  - 4-5 x daily - 7 x weekly - 1 sets - 10 reps - 5 hold    Access Code: [email or web address]  URL: [email or web address]  Date: 08/31/2023  Prepared by: Corrina Ibrahim    Program Notes  Squeeze putty five times, roll it out, pinch, then make a Lumbee with the putty, put all your fingers in the Lumbee and extend fingers.  Repeat this 5X    Exercises  - Putty Squeezes  - 1 x daily - 7 x weekly - 1 sets - 5 reps - 5 hold  - Rolling Putty on Table  - 1 x daily - 7 x weekly - 1 sets - 5 reps - 5 hold  - Tip Pinch with Putty  - 1 x daily - 7 x weekly - 1 sets - 5 reps - 5 hold  - Finger Exension with Putty  - 1 x daily - 7 x weekly - 1 sets - 5 reps - 5 hold

## 2023-10-10 ENCOUNTER — TREATMENT (OUTPATIENT)
Age: 58
End: 2023-10-10

## 2023-10-10 DIAGNOSIS — Z78.9 DECREASED ACTIVITIES OF DAILY LIVING (ADL): ICD-10-CM

## 2023-10-10 DIAGNOSIS — M25.531 RIGHT WRIST PAIN: ICD-10-CM

## 2023-10-10 DIAGNOSIS — M25.521 RIGHT ELBOW PAIN: ICD-10-CM

## 2023-10-10 DIAGNOSIS — M25.621 STIFFNESS OF RIGHT ELBOW JOINT: ICD-10-CM

## 2023-10-10 DIAGNOSIS — M25.641 STIFFNESS OF FINGER JOINT OF RIGHT HAND: Primary | ICD-10-CM

## 2023-10-10 DIAGNOSIS — M25.631 STIFFNESS OF RIGHT WRIST JOINT: ICD-10-CM

## 2023-10-10 DIAGNOSIS — R29.898 UPPER EXTREMITY WEAKNESS: ICD-10-CM

## 2023-10-12 ENCOUNTER — TREATMENT (OUTPATIENT)
Age: 58
End: 2023-10-12

## 2023-10-12 DIAGNOSIS — M25.531 RIGHT WRIST PAIN: ICD-10-CM

## 2023-10-12 DIAGNOSIS — Z78.9 DECREASED ACTIVITIES OF DAILY LIVING (ADL): ICD-10-CM

## 2023-10-12 DIAGNOSIS — M25.641 STIFFNESS OF FINGER JOINT OF RIGHT HAND: Primary | ICD-10-CM

## 2023-10-12 DIAGNOSIS — M25.621 STIFFNESS OF RIGHT ELBOW JOINT: ICD-10-CM

## 2023-10-12 DIAGNOSIS — M25.631 STIFFNESS OF RIGHT WRIST JOINT: ICD-10-CM

## 2023-10-12 DIAGNOSIS — R29.898 UPPER EXTREMITY WEAKNESS: ICD-10-CM

## 2023-10-12 NOTE — PROGRESS NOTES
increasing AROM of digits and wrist.  Increase functional use of right UE. GOALS   Short Term Goals:  by 10/26/2023    Patient will be I with HEP and precautions. (Met)  No pain at rest to R hand/wrist (not met, continue by 10/26/23)  Decrease edema R hand/wrist to minimal (met)  Increase Active R finger flexion to within 1 cm of DPC and thumb flex to base of SF to increase ease with gripping utensils/ADL items (not met, continue until 10/26/23)  Increase R active WE to 35 deg and WF to 50 deg or > to increase ease with grooming/bathing (not met, continue until 10/26/23)  Increase R act Sup/pro to 70 deg or > to increase ease with washing face/turning doorknobs (met)  Increase R act elbow ext to -15 to increase ease with donning shirt (not met but improving, continue until 10/26/23)  Increase R act elbow flex to within 15 to 20 deg of L  (met, 128 deg right elbow flex, left 135 deg)  Improve Quick DASH functional assessment score to less than 40% deficit  (not met but improved, 59%, continue until 10/26/23)    Long Term Goals: by  11/23/2023   Pt will demonstrate AROM WFL in the R UE to be Ind with ADL /home management and driving  Pt will increase  strength to [facility] in order to be I with opening tight jars/containers  Pt will be able to lift and carry items (ie grocery bags, laundry basket etc) with R UE pain 2 of 10 or less. Pts Quick DASH functional assessment score will be less than 20% functional deficit  Pt will be I with HEP for ROM and strengthening as indicated at time of discharge     [employer] Portal     Access Code: [email or web address]  URL: [email or web address]  Date: 07/06/2023  Prepared by: Isaac Clark-Allegra    Exercises  - Seated Elbow Flexion and Extension AROM  - 4-5 x daily - 7 x weekly - 1 sets - 10 reps - 5 hold  - Seated Forearm Pronation and Supination AROM  - 4-5 x daily - 7 x weekly - 1 sets - 10 reps - 5 hold  - Wrist Flexion Extension AROM - Palms Down  - 4-5 x daily - 7 x

## 2023-10-13 NOTE — PROGRESS NOTES
functional use of right UE. GOALS   Short Term Goals:  by 10/26/2023    Patient will be I with HEP and precautions. (Met)  No pain at rest to R hand/wrist (not met, continue by 10/26/23)  Decrease edema R hand/wrist to minimal (met)  Increase Active R finger flexion to within 1 cm of DPC and thumb flex to base of SF to increase ease with gripping utensils/ADL items (not met, continue until 10/26/23)  Increase R active WE to 35 deg and WF to 50 deg or > to increase ease with grooming/bathing (not met, continue until 10/26/23)  Increase R act Sup/pro to 70 deg or > to increase ease with washing face/turning doorknobs (met)  Increase R act elbow ext to -15 to increase ease with donning shirt (not met but improving, continue until 10/26/23)  Increase R act elbow flex to within 15 to 20 deg of L  (met, 128 deg right elbow flex, left 135 deg)  Improve Quick DASH functional assessment score to less than 40% deficit  (not met but improved, 59%, continue until 10/26/23)    Long Term Goals: by  11/23/2023   Pt will demonstrate AROM WFL in the R UE to be Ind with ADL /home management and driving  Pt will increase  strength to [facility] in order to be I with opening tight jars/containers  Pt will be able to lift and carry items (ie grocery bags, laundry basket etc) with R UE pain 2 of 10 or less. Pts Quick DASH functional assessment score will be less than 20% functional deficit  Pt will be I with HEP for ROM and strengthening as indicated at time of discharge     [employer] Portal     Access Code: [email or web address]  URL: [email or web address]  Date: 07/06/2023  Prepared by: [facility] Amber-Kelleker    Exercises  - Seated Elbow Flexion and Extension AROM  - 4-5 x daily - 7 x weekly - 1 sets - 10 reps - 5 hold  - Seated Forearm Pronation and Supination AROM  - 4-5 x daily - 7 x weekly - 1 sets - 10 reps - 5 hold  - Wrist Flexion Extension AROM - Palms Down  - 4-5 x daily - 7 x weekly - 1 sets - 10 reps - 5 hold  - Wrist

## 2023-10-16 ENCOUNTER — TREATMENT (OUTPATIENT)
Age: 58
End: 2023-10-16

## 2023-10-16 DIAGNOSIS — M25.631 STIFFNESS OF RIGHT WRIST JOINT: ICD-10-CM

## 2023-10-16 DIAGNOSIS — R29.898 UPPER EXTREMITY WEAKNESS: ICD-10-CM

## 2023-10-16 DIAGNOSIS — Z78.9 DECREASED ACTIVITIES OF DAILY LIVING (ADL): ICD-10-CM

## 2023-10-16 DIAGNOSIS — M25.441 EFFUSION OF RIGHT HAND: ICD-10-CM

## 2023-10-16 DIAGNOSIS — M25.621 STIFFNESS OF RIGHT ELBOW JOINT: ICD-10-CM

## 2023-10-16 DIAGNOSIS — M25.641 STIFFNESS OF FINGER JOINT OF RIGHT HAND: Primary | ICD-10-CM

## 2023-10-16 DIAGNOSIS — M25.531 RIGHT WRIST PAIN: ICD-10-CM

## 2023-10-16 PROCEDURE — 97110 THERAPEUTIC EXERCISES: CPT | Performed by: OCCUPATIONAL THERAPIST

## 2023-10-19 ENCOUNTER — TREATMENT (OUTPATIENT)
Age: 58
End: 2023-10-19

## 2023-10-19 DIAGNOSIS — Z78.9 DECREASED ACTIVITIES OF DAILY LIVING (ADL): ICD-10-CM

## 2023-10-19 DIAGNOSIS — R29.898 UPPER EXTREMITY WEAKNESS: ICD-10-CM

## 2023-10-19 DIAGNOSIS — M25.521 RIGHT ELBOW PAIN: ICD-10-CM

## 2023-10-19 DIAGNOSIS — M25.441 EFFUSION OF RIGHT HAND: ICD-10-CM

## 2023-10-19 DIAGNOSIS — M25.621 STIFFNESS OF RIGHT ELBOW JOINT: ICD-10-CM

## 2023-10-19 DIAGNOSIS — M25.531 RIGHT WRIST PAIN: ICD-10-CM

## 2023-10-19 DIAGNOSIS — M25.631 STIFFNESS OF RIGHT WRIST JOINT: ICD-10-CM

## 2023-10-19 DIAGNOSIS — M25.641 STIFFNESS OF FINGER JOINT OF RIGHT HAND: Primary | ICD-10-CM

## 2023-10-25 ENCOUNTER — TREATMENT (OUTPATIENT)
Age: 58
End: 2023-10-25

## 2023-10-25 DIAGNOSIS — M25.531 RIGHT WRIST PAIN: ICD-10-CM

## 2023-10-25 DIAGNOSIS — M25.521 RIGHT ELBOW PAIN: ICD-10-CM

## 2023-10-25 DIAGNOSIS — M25.621 STIFFNESS OF RIGHT ELBOW JOINT: ICD-10-CM

## 2023-10-25 DIAGNOSIS — M25.441 EFFUSION OF RIGHT HAND: ICD-10-CM

## 2023-10-25 DIAGNOSIS — M25.631 STIFFNESS OF RIGHT WRIST JOINT: ICD-10-CM

## 2023-10-25 DIAGNOSIS — R29.898 UPPER EXTREMITY WEAKNESS: ICD-10-CM

## 2023-10-25 DIAGNOSIS — M25.641 STIFFNESS OF FINGER JOINT OF RIGHT HAND: Primary | ICD-10-CM

## 2023-10-25 DIAGNOSIS — Z78.9 DECREASED ACTIVITIES OF DAILY LIVING (ADL): ICD-10-CM

## 2023-10-31 ENCOUNTER — TREATMENT (OUTPATIENT)
Age: 58
End: 2023-10-31

## 2023-10-31 DIAGNOSIS — R29.898 UPPER EXTREMITY WEAKNESS: ICD-10-CM

## 2023-10-31 DIAGNOSIS — M25.441 EFFUSION OF RIGHT HAND: ICD-10-CM

## 2023-10-31 DIAGNOSIS — M25.621 STIFFNESS OF RIGHT ELBOW JOINT: ICD-10-CM

## 2023-10-31 DIAGNOSIS — M25.531 RIGHT WRIST PAIN: ICD-10-CM

## 2023-10-31 DIAGNOSIS — Z78.9 DECREASED ACTIVITIES OF DAILY LIVING (ADL): ICD-10-CM

## 2023-10-31 DIAGNOSIS — M25.641 STIFFNESS OF FINGER JOINT OF RIGHT HAND: Primary | ICD-10-CM

## 2023-10-31 DIAGNOSIS — M25.631 STIFFNESS OF RIGHT WRIST JOINT: ICD-10-CM

## 2023-11-02 ENCOUNTER — TREATMENT (OUTPATIENT)
Age: 58
End: 2023-11-02

## 2023-11-02 DIAGNOSIS — M25.531 RIGHT WRIST PAIN: ICD-10-CM

## 2023-11-02 DIAGNOSIS — R29.898 UPPER EXTREMITY WEAKNESS: ICD-10-CM

## 2023-11-02 DIAGNOSIS — M25.631 STIFFNESS OF RIGHT WRIST JOINT: ICD-10-CM

## 2023-11-02 DIAGNOSIS — M25.521 RIGHT ELBOW PAIN: ICD-10-CM

## 2023-11-02 DIAGNOSIS — Z78.9 DECREASED ACTIVITIES OF DAILY LIVING (ADL): ICD-10-CM

## 2023-11-02 DIAGNOSIS — M25.621 STIFFNESS OF RIGHT ELBOW JOINT: ICD-10-CM

## 2023-11-02 DIAGNOSIS — M25.441 EFFUSION OF RIGHT HAND: ICD-10-CM

## 2023-11-02 DIAGNOSIS — M25.641 STIFFNESS OF FINGER JOINT OF RIGHT HAND: Primary | ICD-10-CM

## 2023-11-03 NOTE — PROGRESS NOTES
score will be less than 20% functional deficit  Pt will be I with HEP for ROM and strengthening as indicated at time of discharge     [employer] Portal     Access Code: [email or web address]  URL: [email or web address]  Date: 07/06/2023  Prepared by: Loraine Clark-Allegra    Exercises  - Seated Elbow Flexion and Extension AROM  - 4-5 x daily - 7 x weekly - 1 sets - 10 reps - 5 hold  - Seated Forearm Pronation and Supination AROM  - 4-5 x daily - 7 x weekly - 1 sets - 10 reps - 5 hold  - Wrist Flexion Extension AROM - Palms Down  - 4-5 x daily - 7 x weekly - 1 sets - 10 reps - 5 hold  - Wrist Extension AROM  - 4-5 x daily - 7 x weekly - 1 sets - 10 reps - 5 hold  - Seated Wrist Flexion AROM  - 4-5 x daily - 7 x weekly - 1 sets - 10 reps - 5 hold  - Seated Wrist Radial and Ulnar Deviation AROM  - 4-5 x daily - 7 x weekly - 1 sets - 10 reps - 5 hold  - Digit Flexion Extension  - 4-5 x daily - 7 x weekly - 1 sets - 10 reps - 5 hold    Access Code: [email or web address]  URL: [email or web address]  Date: 08/31/2023  Prepared by: Regina Allen Notes  Squeeze putty five times, roll it out, pinch, then make a [place] with the putty, put all your fingers in the [place] and extend fingers.  Repeat this 5X    Exercises  - Putty Squeezes  - 1 x daily - 7 x weekly - 1 sets - 5 reps - 5 hold  - Rolling Putty on Table  - 1 x daily - 7 x weekly - 1 sets - 5 reps - 5 hold  - Tip Pinch with Putty  - 1 x daily - 7 x weekly - 1 sets - 5 reps - 5 hold  - Finger Exension with Putty  - 1 x daily - 7 x weekly - 1 sets - 5 reps - 5 hold

## 2023-11-06 ENCOUNTER — TREATMENT (OUTPATIENT)
Age: 58
End: 2023-11-06

## 2023-11-06 DIAGNOSIS — M25.641 STIFFNESS OF FINGER JOINT OF RIGHT HAND: Primary | ICD-10-CM

## 2023-11-06 DIAGNOSIS — R29.898 UPPER EXTREMITY WEAKNESS: ICD-10-CM

## 2023-11-06 DIAGNOSIS — M25.631 STIFFNESS OF RIGHT WRIST JOINT: ICD-10-CM

## 2023-11-06 DIAGNOSIS — M25.621 STIFFNESS OF RIGHT ELBOW JOINT: ICD-10-CM

## 2023-11-06 DIAGNOSIS — Z78.9 DECREASED ACTIVITIES OF DAILY LIVING (ADL): ICD-10-CM

## 2023-11-06 DIAGNOSIS — M25.531 RIGHT WRIST PAIN: ICD-10-CM

## 2023-11-06 PROCEDURE — 97110 THERAPEUTIC EXERCISES: CPT | Performed by: OCCUPATIONAL THERAPIST

## 2023-11-09 ENCOUNTER — TREATMENT (OUTPATIENT)
Age: 58
End: 2023-11-09

## 2023-11-09 DIAGNOSIS — M25.621 STIFFNESS OF RIGHT ELBOW JOINT: ICD-10-CM

## 2023-11-09 DIAGNOSIS — Z78.9 DECREASED ACTIVITIES OF DAILY LIVING (ADL): ICD-10-CM

## 2023-11-09 DIAGNOSIS — M25.441 EFFUSION OF RIGHT HAND: ICD-10-CM

## 2023-11-09 DIAGNOSIS — M25.641 STIFFNESS OF FINGER JOINT OF RIGHT HAND: ICD-10-CM

## 2023-11-09 DIAGNOSIS — M25.631 STIFFNESS OF RIGHT WRIST JOINT: ICD-10-CM

## 2023-11-09 DIAGNOSIS — R29.898 UPPER EXTREMITY WEAKNESS: ICD-10-CM

## 2023-11-09 DIAGNOSIS — M25.531 RIGHT WRIST PAIN: Primary | ICD-10-CM

## 2023-11-09 PROCEDURE — 97110 THERAPEUTIC EXERCISES: CPT | Performed by: OCCUPATIONAL THERAPIST

## 2023-11-09 PROCEDURE — 97018 PARAFFIN BATH THERAPY: CPT | Performed by: OCCUPATIONAL THERAPIST

## 2023-11-13 ENCOUNTER — TREATMENT (OUTPATIENT)
Age: 58
End: 2023-11-13

## 2023-11-13 DIAGNOSIS — Z78.9 DECREASED ACTIVITIES OF DAILY LIVING (ADL): ICD-10-CM

## 2023-11-13 DIAGNOSIS — M25.621 STIFFNESS OF RIGHT ELBOW JOINT: ICD-10-CM

## 2023-11-13 DIAGNOSIS — M25.631 STIFFNESS OF RIGHT WRIST JOINT: ICD-10-CM

## 2023-11-13 DIAGNOSIS — M25.531 RIGHT WRIST PAIN: Primary | ICD-10-CM

## 2023-11-13 DIAGNOSIS — R29.898 UPPER EXTREMITY WEAKNESS: ICD-10-CM

## 2023-11-13 DIAGNOSIS — M25.641 STIFFNESS OF FINGER JOINT OF RIGHT HAND: ICD-10-CM

## 2023-11-13 PROCEDURE — 97018 PARAFFIN BATH THERAPY: CPT | Performed by: OCCUPATIONAL THERAPIST

## 2023-11-13 PROCEDURE — 97110 THERAPEUTIC EXERCISES: CPT | Performed by: OCCUPATIONAL THERAPIST

## 2023-11-14 ENCOUNTER — HOSPITAL ENCOUNTER (OUTPATIENT)
Dept: PHYSICAL THERAPY | Age: 58
Setting detail: RECURRING SERIES
Discharge: HOME | End: 2023-11-17

## 2023-11-14 NOTE — THERAPY EVALUATION
Chet Su  : 1965  Primary: Maryjo John   Secondary:  [facility]  [address] Lorenza Bower [address]  [address]  Phone: [phone]  Fax: [phone]    PT Visit Info:    No data recorded      OUTPATIENT THERAPY: 2023  Episode  Appt Desk        Chet Su was scheduled for an FCE on orders from Dr. Rosy Holden. Patient arrived on time for her appointment. Review of medical record indicates that she is currently in active OT care following surgical repair to right arm by Dr. Yolanda De Paz. As her job description indicates heavy upper extremity involvement in work duty, and uncertainty about her clearance post op for testing, I attempted to reach Dr. Rosy Holden and Dr. Yolanda De Paz for guidance. After multiple attempts and the patient waiting an hour, I made decision to postpone testing until clarification could be obtained. As it is impossible to eliminate the right arm from testing, we will need clearance from Dr. Rosy Holden or Friend before rescheduling test. Thank you,    Floyd Espinoomid at home 6-3-23  Surgical fracture repair - 23  Still active in OT  Date of [facility] injury 22  FCE order written 23    Yoselin Conn, PT    Future Appointments   Date Time Provider 4600  46[facility]   2023  1:00 PM Abrahan Brito OT [facility]

## 2023-11-16 ENCOUNTER — TREATMENT (OUTPATIENT)
Age: 58
End: 2023-11-16

## 2023-11-16 DIAGNOSIS — R29.898 UPPER EXTREMITY WEAKNESS: ICD-10-CM

## 2023-11-16 DIAGNOSIS — Z78.9 DECREASED ACTIVITIES OF DAILY LIVING (ADL): ICD-10-CM

## 2023-11-16 DIAGNOSIS — M25.531 RIGHT WRIST PAIN: Primary | ICD-10-CM

## 2023-11-16 DIAGNOSIS — M25.441 EFFUSION OF RIGHT HAND: ICD-10-CM

## 2023-11-16 DIAGNOSIS — M25.621 STIFFNESS OF RIGHT ELBOW JOINT: ICD-10-CM

## 2023-11-16 DIAGNOSIS — M25.631 STIFFNESS OF RIGHT WRIST JOINT: ICD-10-CM

## 2023-11-16 DIAGNOSIS — M25.641 STIFFNESS OF FINGER JOINT OF RIGHT HAND: ICD-10-CM

## 2023-11-22 ENCOUNTER — TREATMENT (OUTPATIENT)
Age: 58
End: 2023-11-22

## 2023-11-22 DIAGNOSIS — Z78.9 DECREASED ACTIVITIES OF DAILY LIVING (ADL): ICD-10-CM

## 2023-11-22 DIAGNOSIS — M25.621 STIFFNESS OF RIGHT ELBOW JOINT: ICD-10-CM

## 2023-11-22 DIAGNOSIS — M25.531 RIGHT WRIST PAIN: Primary | ICD-10-CM

## 2023-11-22 DIAGNOSIS — M25.521 RIGHT ELBOW PAIN: ICD-10-CM

## 2023-11-22 DIAGNOSIS — R29.898 UPPER EXTREMITY WEAKNESS: ICD-10-CM

## 2023-11-22 DIAGNOSIS — M25.641 STIFFNESS OF FINGER JOINT OF RIGHT HAND: ICD-10-CM

## 2023-11-22 DIAGNOSIS — M25.631 STIFFNESS OF RIGHT WRIST JOINT: ICD-10-CM

## 2023-11-22 DIAGNOSIS — M25.441 EFFUSION OF RIGHT HAND: ICD-10-CM

## 2023-11-27 ENCOUNTER — TREATMENT (OUTPATIENT)
Age: 58
End: 2023-11-27

## 2023-11-27 DIAGNOSIS — M25.631 STIFFNESS OF RIGHT WRIST JOINT: ICD-10-CM

## 2023-11-27 DIAGNOSIS — M25.621 STIFFNESS OF RIGHT ELBOW JOINT: ICD-10-CM

## 2023-11-27 DIAGNOSIS — Z78.9 DECREASED ACTIVITIES OF DAILY LIVING (ADL): ICD-10-CM

## 2023-11-27 DIAGNOSIS — R29.898 UPPER EXTREMITY WEAKNESS: ICD-10-CM

## 2023-11-27 DIAGNOSIS — M25.531 RIGHT WRIST PAIN: Primary | ICD-10-CM

## 2023-11-27 DIAGNOSIS — M25.641 STIFFNESS OF FINGER JOINT OF RIGHT HAND: ICD-10-CM

## 2023-11-27 PROCEDURE — 97110 THERAPEUTIC EXERCISES: CPT | Performed by: OCCUPATIONAL THERAPIST

## 2023-11-30 ENCOUNTER — TREATMENT (OUTPATIENT)
Age: 58
End: 2023-11-30

## 2023-11-30 DIAGNOSIS — M25.641 STIFFNESS OF FINGER JOINT OF RIGHT HAND: ICD-10-CM

## 2023-11-30 DIAGNOSIS — M25.531 RIGHT WRIST PAIN: Primary | ICD-10-CM

## 2023-11-30 DIAGNOSIS — R29.898 UPPER EXTREMITY WEAKNESS: ICD-10-CM

## 2023-11-30 DIAGNOSIS — Z78.9 DECREASED ACTIVITIES OF DAILY LIVING (ADL): ICD-10-CM

## 2023-11-30 DIAGNOSIS — M25.631 STIFFNESS OF RIGHT WRIST JOINT: ICD-10-CM

## 2023-11-30 DIAGNOSIS — M25.621 STIFFNESS OF RIGHT ELBOW JOINT: ICD-10-CM

## 2023-11-30 PROCEDURE — 97110 THERAPEUTIC EXERCISES: CPT | Performed by: OCCUPATIONAL THERAPIST

## 2023-12-05 ENCOUNTER — HOSPITAL ENCOUNTER (OUTPATIENT)
Dept: PHYSICAL THERAPY | Age: 58
Setting detail: RECURRING SERIES
Discharge: HOME | End: 2023-12-08
Payer: COMMERCIAL

## 2023-12-05 DIAGNOSIS — M51.36 OTHER INTERVERTEBRAL DISC DEGENERATION, LUMBAR REGION: Primary | ICD-10-CM

## 2023-12-05 DIAGNOSIS — M50.33 OTHER CERVICAL DISC DEGENERATION, CERVICOTHORACIC REGION: ICD-10-CM

## 2023-12-05 PROCEDURE — 97750 PHYSICAL PERFORMANCE TEST: CPT

## 2023-12-05 ASSESSMENT — PAIN SCALES - GENERAL: PAINLEVEL_OUTOF10: 2

## 2023-12-05 ASSESSMENT — PAIN DESCRIPTION - LOCATION: LOCATION: BACK;NECK

## 2023-12-05 ASSESSMENT — PAIN DESCRIPTION - PAIN TYPE: TYPE: CHRONIC PAIN

## 2023-12-08 ENCOUNTER — TREATMENT (OUTPATIENT)
Age: 58
End: 2023-12-08

## 2023-12-08 DIAGNOSIS — M25.621 STIFFNESS OF RIGHT ELBOW JOINT: ICD-10-CM

## 2023-12-08 DIAGNOSIS — M25.631 STIFFNESS OF RIGHT WRIST JOINT: ICD-10-CM

## 2023-12-08 DIAGNOSIS — M25.531 RIGHT WRIST PAIN: Primary | ICD-10-CM

## 2023-12-08 DIAGNOSIS — Z78.9 DECREASED ACTIVITIES OF DAILY LIVING (ADL): ICD-10-CM

## 2023-12-08 DIAGNOSIS — M25.641 STIFFNESS OF FINGER JOINT OF RIGHT HAND: ICD-10-CM

## 2023-12-08 DIAGNOSIS — R29.898 UPPER EXTREMITY WEAKNESS: ICD-10-CM

## 2023-12-08 NOTE — THERAPY EVALUATION
past medical history of Spinal stenosis of lumbar region with neurogenic claudication. Ms. Rhona Huynh  has a past surgical history that includes Bunionectomy (Left) and Forearm surgery (Right, 6/23/2023). Prior Level of Function/Work/Activity:   PT/OT Level of Function/Work/Activity: Prior Level of Function: Independent   Current Level of Function: Independent   Employment Status: Disabled  Occupation: School       Learning: Will there be a co-learner?: No  What is the preferred language of the patient/guardian?: English  Is an  required?: No  How does the patient/guardian prefer to learn new concepts?: Listening; Demonstration    Fall Risk Scale: Zamarripa Total Score: 25    Dominant Side:  right handed      [place] completed FCE testing today with the [facility]. Full report will be scanned into medical record. ASSESSMENT   Initial Assessment:    PURPOSE OF ASSESSMENT  Ms. Rhona Huynh was referred for a Functional Capacity Evaluation to determine her ability to perform the duties of her occupation as a . Ms. Rhona Huynh has been referred with the diagnoses of cervical disc disorder with myelopathy and intervertebral disc disorder with radiculopathy, lumbar region. She was present for evaluation on 12/5/2023 for a period of 2 hours and 24 minutes. RELIABILITY AND CONSISTENCY OF EFFORT  The results of this evaluation suggest that Ms. Rhona Huynh gave a reliable effort, with 35 consistency measures yielding a reliability score of 61 out of 76 (80%). FUNCTIONAL ABILITIES  Evaluee's demonstrated abilities meet essential job demands for the following activities: Mid Lift, Carrying, Pushing, Pulling, Overall Strength, Sitting, Walking, Bending, Reach Immediate (Front) Right, Reach Immediate (Front) Left, Reach Overhead (Front) Right, Reach Overhead (Front) Left.     FUNCTIONAL LIMITATIONS  Evaluee's demonstrated abilities do not meet

## 2023-12-12 ENCOUNTER — TREATMENT (OUTPATIENT)
Age: 58
End: 2023-12-12

## 2023-12-12 DIAGNOSIS — Z78.9 DECREASED ACTIVITIES OF DAILY LIVING (ADL): ICD-10-CM

## 2023-12-12 DIAGNOSIS — M25.631 STIFFNESS OF RIGHT WRIST JOINT: ICD-10-CM

## 2023-12-12 DIAGNOSIS — M25.641 STIFFNESS OF FINGER JOINT OF RIGHT HAND: ICD-10-CM

## 2023-12-12 DIAGNOSIS — M25.531 RIGHT WRIST PAIN: Primary | ICD-10-CM

## 2023-12-12 DIAGNOSIS — R29.898 UPPER EXTREMITY WEAKNESS: ICD-10-CM

## 2023-12-12 DIAGNOSIS — M25.621 STIFFNESS OF RIGHT ELBOW JOINT: ICD-10-CM

## 2023-12-12 PROCEDURE — 97110 THERAPEUTIC EXERCISES: CPT | Performed by: OCCUPATIONAL THERAPIST

## 2023-12-26 ENCOUNTER — TREATMENT (OUTPATIENT)
Age: 58
End: 2023-12-26

## 2023-12-26 DIAGNOSIS — M25.621 STIFFNESS OF RIGHT ELBOW JOINT: ICD-10-CM

## 2023-12-26 DIAGNOSIS — M25.521 RIGHT ELBOW PAIN: ICD-10-CM

## 2023-12-26 DIAGNOSIS — Z78.9 DECREASED ACTIVITIES OF DAILY LIVING (ADL): ICD-10-CM

## 2023-12-26 DIAGNOSIS — M25.641 STIFFNESS OF FINGER JOINT OF RIGHT HAND: ICD-10-CM

## 2023-12-26 DIAGNOSIS — R29.898 UPPER EXTREMITY WEAKNESS: ICD-10-CM

## 2023-12-26 DIAGNOSIS — M25.531 RIGHT WRIST PAIN: Primary | ICD-10-CM

## 2023-12-26 DIAGNOSIS — M25.631 STIFFNESS OF RIGHT WRIST JOINT: ICD-10-CM

## 2023-12-26 PROCEDURE — 97110 THERAPEUTIC EXERCISES: CPT | Performed by: OCCUPATIONAL THERAPIST

## 2023-12-26 PROCEDURE — 97010 HOT OR COLD PACKS THERAPY: CPT | Performed by: OCCUPATIONAL THERAPIST

## 2025-03-14 ENCOUNTER — RESULTS FOLLOW-UP (OUTPATIENT)
Dept: FAMILY MEDICINE CLINIC | Facility: CLINIC | Age: 60
End: 2025-03-14

## 2025-03-14 ENCOUNTER — OFFICE VISIT (OUTPATIENT)
Dept: FAMILY MEDICINE CLINIC | Facility: CLINIC | Age: 60
End: 2025-03-14

## 2025-03-14 VITALS
TEMPERATURE: 97.6 F | OXYGEN SATURATION: 98 % | RESPIRATION RATE: 18 BRPM | BODY MASS INDEX: 31.7 KG/M2 | DIASTOLIC BLOOD PRESSURE: 80 MMHG | SYSTOLIC BLOOD PRESSURE: 120 MMHG | WEIGHT: 214 LBS | HEIGHT: 69 IN | HEART RATE: 101 BPM

## 2025-03-14 DIAGNOSIS — N30.01 ACUTE CYSTITIS WITH HEMATURIA: ICD-10-CM

## 2025-03-14 DIAGNOSIS — R31.9 HEMATURIA, UNSPECIFIED TYPE: Primary | ICD-10-CM

## 2025-03-14 DIAGNOSIS — R31.9 HEMATURIA, UNSPECIFIED TYPE: ICD-10-CM

## 2025-03-14 DIAGNOSIS — R31.0 GROSS HEMATURIA: ICD-10-CM

## 2025-03-14 LAB
ALBUMIN SERPL-MCNC: 4.1 G/DL (ref 3.5–5)
ALBUMIN/GLOB SERPL: 1.5 (ref 1–1.9)
ALP SERPL-CCNC: 90 U/L (ref 35–104)
ALT SERPL-CCNC: 16 U/L (ref 8–45)
ANION GAP SERPL CALC-SCNC: 14 MMOL/L (ref 7–16)
AST SERPL-CCNC: 16 U/L (ref 15–37)
BASOPHILS # BLD: 0.05 K/UL (ref 0–0.2)
BASOPHILS NFR BLD: 0.6 % (ref 0–2)
BILIRUB SERPL-MCNC: 0.3 MG/DL (ref 0–1.2)
BILIRUBIN, URINE, POC: NORMAL
BLOOD URINE, POC: NORMAL
BUN SERPL-MCNC: 13 MG/DL (ref 6–23)
CALCIUM SERPL-MCNC: 10.1 MG/DL (ref 8.8–10.2)
CHLORIDE SERPL-SCNC: 102 MMOL/L (ref 98–107)
CO2 SERPL-SCNC: 26 MMOL/L (ref 20–29)
CREAT SERPL-MCNC: 0.88 MG/DL (ref 0.6–1.1)
DIFFERENTIAL METHOD BLD: ABNORMAL
EOSINOPHIL # BLD: 0.13 K/UL (ref 0–0.8)
EOSINOPHIL NFR BLD: 1.5 % (ref 0.5–7.8)
ERYTHROCYTE [DISTWIDTH] IN BLOOD BY AUTOMATED COUNT: 13.2 % (ref 11.9–14.6)
GLOBULIN SER CALC-MCNC: 2.8 G/DL (ref 2.3–3.5)
GLUCOSE SERPL-MCNC: 95 MG/DL (ref 70–99)
GLUCOSE URINE, POC: NEGATIVE
HCT VFR BLD AUTO: 44.7 % (ref 35.8–46.3)
HGB BLD-MCNC: 14.8 G/DL (ref 11.7–15.4)
IMM GRANULOCYTES # BLD AUTO: 0.03 K/UL (ref 0–0.5)
IMM GRANULOCYTES NFR BLD AUTO: 0.3 % (ref 0–5)
KETONES, URINE, POC: NORMAL
LEUKOCYTE ESTERASE, URINE, POC: NORMAL
LYMPHOCYTES # BLD: 2.23 K/UL (ref 0.5–4.6)
LYMPHOCYTES NFR BLD: 25.2 % (ref 13–44)
MCH RBC QN AUTO: 32.2 PG (ref 26.1–32.9)
MCHC RBC AUTO-ENTMCNC: 33.1 G/DL (ref 31.4–35)
MCV RBC AUTO: 97.2 FL (ref 82–102)
MONOCYTES # BLD: 0.67 K/UL (ref 0.1–1.3)
MONOCYTES NFR BLD: 7.6 % (ref 4–12)
NEUTS SEG # BLD: 5.75 K/UL (ref 1.7–8.2)
NEUTS SEG NFR BLD: 64.8 % (ref 43–78)
NITRITE, URINE, POC: POSITIVE
NRBC # BLD: 0 K/UL (ref 0–0.2)
PH, URINE, POC: 6 (ref 4.6–8)
PLATELET # BLD AUTO: 261 K/UL (ref 150–450)
PMV BLD AUTO: 12.7 FL (ref 9.4–12.3)
POTASSIUM SERPL-SCNC: 4.9 MMOL/L (ref 3.5–5.1)
PROT SERPL-MCNC: 6.9 G/DL (ref 6.3–8.2)
PROTEIN,URINE, POC: NORMAL
RBC # BLD AUTO: 4.6 M/UL (ref 4.05–5.2)
SODIUM SERPL-SCNC: 141 MMOL/L (ref 136–145)
SPECIFIC GRAVITY, URINE, POC: 1.02 (ref 1–1.03)
URINALYSIS CLARITY, POC: CLEAR
URINALYSIS COLOR, POC: YELLOW
UROBILINOGEN, POC: NORMAL
WBC # BLD AUTO: 8.9 K/UL (ref 4.3–11.1)

## 2025-03-14 RX ORDER — NITROFURANTOIN 25; 75 MG/1; MG/1
100 CAPSULE ORAL 2 TIMES DAILY
Qty: 10 CAPSULE | Refills: 0 | Status: SHIPPED | OUTPATIENT
Start: 2025-03-14 | End: 2025-03-19

## 2025-03-14 SDOH — ECONOMIC STABILITY: FOOD INSECURITY: WITHIN THE PAST 12 MONTHS, THE FOOD YOU BOUGHT JUST DIDN'T LAST AND YOU DIDN'T HAVE MONEY TO GET MORE.: NEVER TRUE

## 2025-03-14 SDOH — ECONOMIC STABILITY: FOOD INSECURITY: WITHIN THE PAST 12 MONTHS, YOU WORRIED THAT YOUR FOOD WOULD RUN OUT BEFORE YOU GOT MONEY TO BUY MORE.: NEVER TRUE

## 2025-03-14 ASSESSMENT — ENCOUNTER SYMPTOMS
VOMITING: 0
RESPIRATORY NEGATIVE: 1
BACK PAIN: 1
ABDOMINAL PAIN: 0
NAUSEA: 0
CONSTIPATION: 1
BLOOD IN STOOL: 0
DIARRHEA: 0

## 2025-03-14 ASSESSMENT — PATIENT HEALTH QUESTIONNAIRE - PHQ9
SUM OF ALL RESPONSES TO PHQ QUESTIONS 1-9: 0
1. LITTLE INTEREST OR PLEASURE IN DOING THINGS: NOT AT ALL
SUM OF ALL RESPONSES TO PHQ QUESTIONS 1-9: 0
2. FEELING DOWN, DEPRESSED OR HOPELESS: NOT AT ALL

## 2025-03-14 NOTE — PATIENT INSTRUCTIONS
Radiology [phone]  3 [facility] 2nd Floor.    [facility]  [address] [address]  Open until 5:00 PM  [phone]

## 2025-03-14 NOTE — PROGRESS NOTES
Aurora Wilhelm is a 59 y.o. female who presents today for the following:  Chief Complaint   Patient presents with    New Patient     Establish Care  C/o 1 month ago went to Urgent care reports blood in urine with urgency to urinate last couple years       No Known Allergies    Current Outpatient Medications   Medication Sig Dispense Refill    nitrofurantoin, macrocrystal-monohydrate, (MACROBID) 100 MG capsule Take 1 capsule by mouth 2 times daily for 5 days 10 capsule 0    amitriptyline (ELAVIL) 25 MG tablet Take 1 tablet by mouth nightly 30 tablet 0    baclofen (LIORESAL) 10 MG tablet 4 times daily as needed Takes 4-5 times per day      lidocaine (LIDODERM) 5 % daily as needed NECK AND BACK      Multiple Vitamins-Minerals (CENTRUM SILVER) CHEW Take by mouth daily      Menthol, Topical Analgesic, (BIOFREEZE ROLL-ON EX) Apply topically as needed Back and neck      ibuprofen (ADVIL;MOTRIN) 200 MG tablet Take 2 tablets by mouth as needed for Pain      NONFORMULARY as needed CBD and delta gummies      acetaminophen (TYLENOL) 500 MG tablet Take 1 tablet by mouth every 6 hours as needed for Pain       No current facility-administered medications for this visit.       Past Medical History:   Diagnosis Date    Elbow dislocation     right    Spinal stenosis of lumbar region with neurogenic claudication 2022    epidural injections    UTI (urinary tract infection)     Wrist fracture     right       Past Surgical History:   Procedure Laterality Date    BUNIONECTOMY Left     FOREARM SURGERY Right 6/23/2023    Open reduction, internal fixation right distal radius fracture performed by Rachel Moseley MD at [facility]       Social History     Tobacco Use    Smoking status: Every Day     Current packs/day: 0.50     Average packs/day: 0.5 packs/day for 44.2 years (22.1 ttl pk-yrs)     Types: Cigarettes     Start date: 1981     Passive exposure: Current    Smokeless tobacco: Never    Tobacco comments:     Trying to quit,

## 2025-03-15 NOTE — RESULT ENCOUNTER NOTE
Urine shows infection and sent for culture.  Sent in macrobid BID to take for 5 days while awaiting culture.  Other labs looked ok.    Please let me know if patient has any further questions or concerns.

## 2025-03-17 LAB
BACTERIA SPEC CULT: NORMAL
BACTERIA SPEC CULT: NORMAL
SERVICE CMNT-IMP: NORMAL

## 2025-03-17 NOTE — RESULT ENCOUNTER NOTE
Urine culture showed most likely improper collection/contamination.  Complete the antibiotic as she is having symptoms.  Pt instructed to wipe front to back. Void after intercourse. Showers rather than baths. Increase water and try cranberry juice or supplement.  Avoid douches or scented hygiene products.  Avoid bladder irritants such as caffeine or alcohol.        Please let me know if patient has any further questions or concerns.

## 2025-03-26 ENCOUNTER — HOSPITAL ENCOUNTER (OUTPATIENT)
Dept: CT IMAGING | Age: 60
Discharge: HOME | End: 2025-03-28

## 2025-03-26 DIAGNOSIS — R31.0 GROSS HEMATURIA: ICD-10-CM

## 2025-03-26 PROCEDURE — 74177 CT ABD & PELVIS W/CONTRAST: CPT

## 2025-03-26 PROCEDURE — 6360000004 HC RX CONTRAST MEDICATION: Performed by: NURSE PRACTITIONER

## 2025-03-26 RX ORDER — DIATRIZOATE MEGLUMINE AND DIATRIZOATE SODIUM 660; 100 MG/ML; MG/ML
15 SOLUTION ORAL; RECTAL
Status: DISCONTINUED | OUTPATIENT
Start: 2025-03-26 | End: 2025-03-29 | Stop reason: HOSPADM

## 2025-03-26 RX ORDER — IOPAMIDOL 755 MG/ML
100 INJECTION, SOLUTION INTRAVASCULAR
Status: COMPLETED | OUTPATIENT
Start: 2025-03-26 | End: 2025-03-26

## 2025-03-26 RX ADMIN — DIATRIZOATE MEGLUMINE AND DIATRIZOATE SODIUM 15 ML: 660; 100 LIQUID ORAL; RECTAL at 08:59

## 2025-03-26 RX ADMIN — IOPAMIDOL 100 ML: 755 INJECTION, SOLUTION INTRAVENOUS at 09:01

## 2025-04-01 ENCOUNTER — RESULTS FOLLOW-UP (OUTPATIENT)
Dept: FAMILY MEDICINE CLINIC | Facility: CLINIC | Age: 60
End: 2025-04-01

## 2025-04-01 PROBLEM — N32.89 BLADDER MASS: Status: ACTIVE | Noted: 2025-04-01

## 2025-04-01 PROBLEM — R93.2 ABNORMAL FINDING ON IMAGING OF LIVER: Status: ACTIVE | Noted: 2025-04-01

## 2025-04-01 PROBLEM — N13.30 HYDROURETERONEPHROSIS: Status: ACTIVE | Noted: 2025-04-01

## 2025-04-01 NOTE — RESULT ENCOUNTER NOTE
Mayra, please reopen referral.  We need to get her in asap, due to abnormal CT abdomen/pelvis concern for malignancy.      Pat:  I left patient message on home and mobile to please call asap for results.  When she calls/please try tomorrow if she does not call back today:  Urology has not been able to reach her.  Patient's imaging result showed:  mass to the bladder may be accumulation of blood or cancer.  Swelling to right kidney and ureter (tube that drain the kidney).  Small liver area of the liver that we need to follow up on. Please provide her with urology number to call asap [phone].    If patient has any additional questions or concerns, please let me know.

## 2025-04-03 ENCOUNTER — OFFICE VISIT (OUTPATIENT)
Dept: UROLOGY | Age: 60
End: 2025-04-03
Payer: MEDICAID

## 2025-04-03 ENCOUNTER — TELEPHONE (OUTPATIENT)
Dept: UROLOGY | Age: 60
End: 2025-04-03

## 2025-04-03 DIAGNOSIS — N32.89 BLADDER MASS: ICD-10-CM

## 2025-04-03 DIAGNOSIS — R31.0 GROSS HEMATURIA: Primary | ICD-10-CM

## 2025-04-03 LAB
BILIRUBIN, URINE, POC: NEGATIVE
BLOOD URINE, POC: ABNORMAL
GLUCOSE URINE, POC: NEGATIVE MG/DL
KETONES, URINE, POC: NEGATIVE MG/DL
LEUKOCYTE ESTERASE, URINE, POC: ABNORMAL
NITRITE, URINE, POC: POSITIVE
PH, URINE, POC: 6 (ref 4.6–8)
PROTEIN,URINE, POC: 300 MG/DL
SPECIFIC GRAVITY, URINE, POC: 1.02 (ref 1–1.03)
URINALYSIS CLARITY, POC: ABNORMAL
URINALYSIS COLOR, POC: ABNORMAL
UROBILINOGEN, POC: ABNORMAL MG/DL

## 2025-04-03 PROCEDURE — 99244 OFF/OP CNSLTJ NEW/EST MOD 40: CPT | Performed by: UROLOGY

## 2025-04-03 PROCEDURE — 81003 URINALYSIS AUTO W/O SCOPE: CPT | Performed by: UROLOGY

## 2025-04-03 ASSESSMENT — ENCOUNTER SYMPTOMS
HEARTBURN: 0
ABDOMINAL PAIN: 0
EYE DISCHARGE: 0
CONSTIPATION: 0
SKIN LESIONS: 0
EYE PAIN: 0
COUGH: 0
BACK PAIN: 0
WHEEZING: 0
NAUSEA: 0
DIARRHEA: 0
SHORTNESS OF BREATH: 0
VOMITING: 0
BLOOD IN STOOL: 0
INDIGESTION: 0

## 2025-04-03 NOTE — TELEPHONE ENCOUNTER
----- Message from Dr. Roger Keith DO sent at 4/3/2025  3:37 PM EDT -----  Regarding: surg  TURBT  90min  Gen  Cbc, bmp, ua, Ucx   Obs  Cipro 400mg IV preop

## 2025-04-03 NOTE — PROGRESS NOTES
[facility]  [address] [place] [address]  [phone]    Aurora Wilhelm  : 1965      Chief Complaint   Patient presents with    New Patient        HPI   59 y.o., female who is referred by Dr. Sahu for evaluation of gross hematuria.  Pt reports a two yr history of painless gross hematuria.  CT on 25 shows a 7.4cm BT with R hydroureteronephrosis and R renal atrophy.  No obvious mets seen.  Greater than 40 pack yr smoker.  No prior abd surgery.  Cr was 0.88 on 3/14/25.        Past Medical History:   Diagnosis Date    Elbow dislocation     right    Spinal stenosis of lumbar region with neurogenic claudication     epidural injections    UTI (urinary tract infection)     Wrist fracture     right     Past Surgical History:   Procedure Laterality Date    BUNIONECTOMY Left     FOREARM SURGERY Right 2023    Open reduction, internal fixation right distal radius fracture performed by Rachel Moseley MD at [facility]     Current Outpatient Medications   Medication Sig Dispense Refill    amitriptyline (ELAVIL) 25 MG tablet Take 1 tablet by mouth nightly 30 tablet 0    baclofen (LIORESAL) 10 MG tablet 4 times daily as needed Takes 4-5 times per day      lidocaine (LIDODERM) 5 % daily as needed NECK AND BACK      Multiple Vitamins-Minerals (CENTRUM SILVER) CHEW Take by mouth daily      Menthol, Topical Analgesic, (BIOFREEZE ROLL-ON EX) Apply topically as needed Back and neck      ibuprofen (ADVIL;MOTRIN) 200 MG tablet Take 2 tablets by mouth as needed for Pain      NONFORMULARY as needed CBD and delta gummies      acetaminophen (TYLENOL) 500 MG tablet Take 1 tablet by mouth every 6 hours as needed for Pain       No current facility-administered medications for this visit.     No Known Allergies  Social History     Socioeconomic History    Marital status: Single     Spouse name: Not on file    Number of children: Not on file    Years of education: Not on file    Highest

## 2025-04-08 PROBLEM — R31.0 GROSS HEMATURIA: Status: ACTIVE | Noted: 2025-04-03

## 2025-04-09 ENCOUNTER — PREP FOR PROCEDURE (OUTPATIENT)
Dept: UROLOGY | Age: 60
End: 2025-04-09

## 2025-04-09 DIAGNOSIS — N32.89 BLADDER MASS: Primary | ICD-10-CM

## 2025-05-07 ENCOUNTER — HOSPITAL ENCOUNTER (OUTPATIENT)
Dept: SURGERY | Age: 60
Discharge: HOME | End: 2025-05-10

## 2025-05-07 VITALS
DIASTOLIC BLOOD PRESSURE: 87 MMHG | HEART RATE: 68 BPM | TEMPERATURE: 97.5 F | HEIGHT: 69 IN | SYSTOLIC BLOOD PRESSURE: 145 MMHG | BODY MASS INDEX: 32.26 KG/M2 | OXYGEN SATURATION: 96 % | WEIGHT: 217.8 LBS | RESPIRATION RATE: 18 BRPM

## 2025-05-07 DIAGNOSIS — N32.89 BLADDER MASS: ICD-10-CM

## 2025-05-07 LAB
ANION GAP SERPL CALC-SCNC: 13 MMOL/L (ref 7–16)
APPEARANCE UR: ABNORMAL
BACTERIA URNS QL MICRO: ABNORMAL /HPF
BILIRUB UR QL: NEGATIVE
BUN SERPL-MCNC: 16 MG/DL (ref 6–23)
CALCIUM SERPL-MCNC: 9.4 MG/DL (ref 8.8–10.2)
CASTS URNS QL MICRO: ABNORMAL /LPF
CHLORIDE SERPL-SCNC: 105 MMOL/L (ref 98–107)
CO2 SERPL-SCNC: 23 MMOL/L (ref 20–29)
COLOR UR: ABNORMAL
CREAT SERPL-MCNC: 1.02 MG/DL (ref 0.6–1.1)
EPI CELLS #/AREA URNS HPF: ABNORMAL /HPF
ERYTHROCYTE [DISTWIDTH] IN BLOOD BY AUTOMATED COUNT: 13.3 % (ref 11.9–14.6)
GLUCOSE SERPL-MCNC: 95 MG/DL (ref 70–99)
GLUCOSE UR STRIP.AUTO-MCNC: NEGATIVE MG/DL
HCT VFR BLD AUTO: 41.8 % (ref 35.8–46.3)
HGB BLD-MCNC: 13.9 G/DL (ref 11.7–15.4)
HGB UR QL STRIP: ABNORMAL
KETONES UR QL STRIP.AUTO: NEGATIVE MG/DL
LEUKOCYTE ESTERASE UR QL STRIP.AUTO: ABNORMAL
MCH RBC QN AUTO: 32.3 PG (ref 26.1–32.9)
MCHC RBC AUTO-ENTMCNC: 33.3 G/DL (ref 31.4–35)
MCV RBC AUTO: 97.2 FL (ref 82–102)
NITRITE UR QL STRIP.AUTO: NEGATIVE
NRBC # BLD: 0 K/UL (ref 0–0.2)
OTHER OBSERVATIONS: ABNORMAL
PH UR STRIP: 6.5 (ref 5–9)
PLATELET # BLD AUTO: 233 K/UL (ref 150–450)
PMV BLD AUTO: 11.8 FL (ref 9.4–12.3)
POTASSIUM SERPL-SCNC: 4.1 MMOL/L (ref 3.5–5.1)
PROT UR STRIP-MCNC: 300 MG/DL
RBC # BLD AUTO: 4.3 M/UL (ref 4.05–5.2)
RBC #/AREA URNS HPF: >100 /HPF
SODIUM SERPL-SCNC: 141 MMOL/L (ref 136–145)
SP GR UR REFRACTOMETRY: 1.02 (ref 1–1.02)
UROBILINOGEN UR QL STRIP.AUTO: 0.2 EU/DL (ref 0.2–1)
WBC # BLD AUTO: 7.4 K/UL (ref 4.3–11.1)
WBC URNS QL MICRO: >100 /HPF

## 2025-05-07 PROCEDURE — 80048 BASIC METABOLIC PNL TOTAL CA: CPT

## 2025-05-07 PROCEDURE — 85027 COMPLETE CBC AUTOMATED: CPT

## 2025-05-07 PROCEDURE — 87086 URINE CULTURE/COLONY COUNT: CPT

## 2025-05-07 PROCEDURE — 81001 URINALYSIS AUTO W/SCOPE: CPT

## 2025-05-07 ASSESSMENT — PAIN DESCRIPTION - LOCATION: LOCATION: ABDOMEN

## 2025-05-07 ASSESSMENT — PAIN DESCRIPTION - DESCRIPTORS: DESCRIPTORS: PRESSURE

## 2025-05-07 ASSESSMENT — PAIN DESCRIPTION - PAIN TYPE: TYPE: ACUTE PAIN

## 2025-05-07 ASSESSMENT — PAIN SCALES - GENERAL: PAINLEVEL_OUTOF10: 6

## 2025-05-07 ASSESSMENT — PAIN - FUNCTIONAL ASSESSMENT: PAIN_FUNCTIONAL_ASSESSMENT: PREVENTS OR INTERFERES SOME ACTIVE ACTIVITIES AND ADLS

## 2025-05-07 ASSESSMENT — PAIN DESCRIPTION - ORIENTATION: ORIENTATION: LOWER

## 2025-05-07 ASSESSMENT — PAIN DESCRIPTION - FREQUENCY: FREQUENCY: INTERMITTENT

## 2025-05-07 NOTE — PRE-PROCEDURE INSTRUCTIONS
Patient verified name and     Order for consent was found in EHR and does match case posting; patient verified.     Type lB surgery, walk in  assessment complete.    Labs per surgeon: ua ucx, cbc collected and results in EPIC;   Labs per anesthesia protocol: no additional  EKG: not required per protocol. Patient denies any hx of CAD, or MARIE    Patient provided with and instructed on educational handouts including Guide to Surgery, Preventing Surgical Site Infections, Pain Management, and [facility] Brochure.    Patient answered medical/surgical history questions at their best of ability. All prior to admission medications documented in EPIC. Original medication prescription bottle was not visualized during patient appointment.     Patient instructed to hold all vitamins 7 days prior to surgery and NSAIDS 5 days prior to surgery, patient verbalized understanding.     Patient teach back successful and patient demonstrates knowledge of instructions.

## 2025-05-07 NOTE — PRE-PROCEDURE INSTRUCTIONS
PLEASE CONTINUE TAKING ALL PRESCRIPTION MEDICATIONS UP TO THE DAY OF SURGERY UNLESS OTHERWISE DIRECTED BELOW. You may take Tylenol, allergy,  and/or indigestion medications.     TAKE ONLY THESE MEDICATIONS ON THE DAY OF SURGERY   ACETAMINOPHEN IF NEEDED    BACLOFEN           DISCONTINUE all vitamins and supplements 7 days prior to surgery. DISCONTINUE Non-Steroidal Anti-Inflammatory (NSAIDS) such as Advil and Aleve 5 days prior to surgery.     Home Medications to Hold- please continue all other medications except these.    Hold all vitamins and supplements 7 days prior to surgery        Comments      On the day before surgery please take 2 Tylenol in the morning and then again before bed. You may use either regular or extra strength. 05/13/2025          Please do not bring home medications with you on the day of surgery unless otherwise directed by your nurse.  If you are instructed to bring home medications, please give them to your nurse as they will be administered by the nursing staff.    If you have any questions, please call [facility] [phone].    A copy of this note was provided to the patient for reference.

## 2025-05-09 LAB
BACTERIA SPEC CULT: NORMAL
SERVICE CMNT-IMP: NORMAL

## 2025-05-13 ENCOUNTER — ANESTHESIA EVENT (OUTPATIENT)
Dept: SURGERY | Age: 60
DRG: 670 | End: 2025-05-13

## 2025-05-14 ENCOUNTER — ANESTHESIA (OUTPATIENT)
Dept: SURGERY | Age: 60
DRG: 670 | End: 2025-05-14

## 2025-05-14 ENCOUNTER — HOSPITAL ENCOUNTER (INPATIENT)
Age: 60
LOS: 1 days | Discharge: HOME | DRG: 670 | End: 2025-05-15
Attending: UROLOGY | Admitting: UROLOGY

## 2025-05-14 PROBLEM — Z98.890 H/O TRANSURETHRAL RESECTION OF BLADDER TUMOR (TURBT): Status: ACTIVE | Noted: 2025-05-14

## 2025-05-14 PROBLEM — Z86.03 H/O TRANSURETHRAL RESECTION OF BLADDER TUMOR (TURBT): Status: ACTIVE | Noted: 2025-05-14

## 2025-05-14 LAB
HCT VFR BLD AUTO: 41.3 % (ref 35.8–46.3)
HGB BLD-MCNC: 13.7 G/DL (ref 11.7–15.4)

## 2025-05-14 PROCEDURE — 6360000002 HC RX W HCPCS: Performed by: ANESTHESIOLOGY

## 2025-05-14 PROCEDURE — 85014 HEMATOCRIT: CPT

## 2025-05-14 PROCEDURE — 1100000000 HC RM PRIVATE

## 2025-05-14 PROCEDURE — 3600000014 HC SURGERY LEVEL 4 ADDTL 15MIN: Performed by: UROLOGY

## 2025-05-14 PROCEDURE — 2500000003 HC RX 250 WO HCPCS: Performed by: NURSE ANESTHETIST, CERTIFIED REGISTERED

## 2025-05-14 PROCEDURE — 88307 TISSUE EXAM BY PATHOLOGIST: CPT

## 2025-05-14 PROCEDURE — 3700000001 HC ADD 15 MINUTES (ANESTHESIA): Performed by: UROLOGY

## 2025-05-14 PROCEDURE — 2709999900 HC NON-CHARGEABLE SUPPLY: Performed by: UROLOGY

## 2025-05-14 PROCEDURE — 6370000000 HC RX 637 (ALT 250 FOR IP): Performed by: UROLOGY

## 2025-05-14 PROCEDURE — 3700000000 HC ANESTHESIA ATTENDED CARE: Performed by: UROLOGY

## 2025-05-14 PROCEDURE — 2700000000 HC OXYGEN THERAPY PER DAY

## 2025-05-14 PROCEDURE — 94760 N-INVAS EAR/PLS OXIMETRY 1: CPT

## 2025-05-14 PROCEDURE — 85018 HEMOGLOBIN: CPT

## 2025-05-14 PROCEDURE — 7100000001 HC PACU RECOVERY - ADDTL 15 MIN: Performed by: UROLOGY

## 2025-05-14 PROCEDURE — 6360000002 HC RX W HCPCS: Performed by: NURSE ANESTHETIST, CERTIFIED REGISTERED

## 2025-05-14 PROCEDURE — 7100000000 HC PACU RECOVERY - FIRST 15 MIN: Performed by: UROLOGY

## 2025-05-14 PROCEDURE — 2580000003 HC RX 258: Performed by: ANESTHESIOLOGY

## 2025-05-14 PROCEDURE — 6370000000 HC RX 637 (ALT 250 FOR IP): Performed by: ANESTHESIOLOGY

## 2025-05-14 PROCEDURE — 6360000002 HC RX W HCPCS

## 2025-05-14 PROCEDURE — 3600000004 HC SURGERY LEVEL 4 BASE: Performed by: UROLOGY

## 2025-05-14 PROCEDURE — 2720000010 HC SURG SUPPLY STERILE: Performed by: UROLOGY

## 2025-05-14 PROCEDURE — 2580000003 HC RX 258: Performed by: UROLOGY

## 2025-05-14 PROCEDURE — 52240 CYSTOSCOPY AND TREATMENT: CPT | Performed by: UROLOGY

## 2025-05-14 PROCEDURE — 0TBB8ZZ EXCISION OF BLADDER, VIA NATURAL OR ARTIFICIAL OPENING ENDOSCOPIC: ICD-10-PCS | Performed by: UROLOGY

## 2025-05-14 PROCEDURE — 6360000002 HC RX W HCPCS: Performed by: UROLOGY

## 2025-05-14 RX ORDER — ONDANSETRON 2 MG/ML
INJECTION INTRAMUSCULAR; INTRAVENOUS
Status: DISCONTINUED | OUTPATIENT
Start: 2025-05-14 | End: 2025-05-14 | Stop reason: SDUPTHER

## 2025-05-14 RX ORDER — SODIUM CHLORIDE 0.9 % (FLUSH) 0.9 %
5-40 SYRINGE (ML) INJECTION PRN
Status: DISCONTINUED | OUTPATIENT
Start: 2025-05-14 | End: 2025-05-14 | Stop reason: HOSPADM

## 2025-05-14 RX ORDER — OXYBUTYNIN CHLORIDE 5 MG/1
5 TABLET ORAL 3 TIMES DAILY PRN
Status: DISCONTINUED | OUTPATIENT
Start: 2025-05-14 | End: 2025-05-15 | Stop reason: HOSPADM

## 2025-05-14 RX ORDER — HYDRALAZINE HYDROCHLORIDE 20 MG/ML
10 INJECTION INTRAMUSCULAR; INTRAVENOUS
Status: DISCONTINUED | OUTPATIENT
Start: 2025-05-14 | End: 2025-05-14 | Stop reason: HOSPADM

## 2025-05-14 RX ORDER — MIDAZOLAM HYDROCHLORIDE 2 MG/2ML
2 INJECTION, SOLUTION INTRAMUSCULAR; INTRAVENOUS
Status: DISCONTINUED | OUTPATIENT
Start: 2025-05-14 | End: 2025-05-14 | Stop reason: HOSPADM

## 2025-05-14 RX ORDER — SODIUM CHLORIDE 0.9 % (FLUSH) 0.9 %
5-40 SYRINGE (ML) INJECTION EVERY 12 HOURS SCHEDULED
Status: DISCONTINUED | OUTPATIENT
Start: 2025-05-14 | End: 2025-05-14 | Stop reason: HOSPADM

## 2025-05-14 RX ORDER — NALOXONE HYDROCHLORIDE 0.4 MG/ML
INJECTION, SOLUTION INTRAMUSCULAR; INTRAVENOUS; SUBCUTANEOUS PRN
Status: DISCONTINUED | OUTPATIENT
Start: 2025-05-14 | End: 2025-05-14 | Stop reason: HOSPADM

## 2025-05-14 RX ORDER — FENTANYL CITRATE 50 UG/ML
100 INJECTION, SOLUTION INTRAMUSCULAR; INTRAVENOUS
Status: DISCONTINUED | OUTPATIENT
Start: 2025-05-14 | End: 2025-05-14 | Stop reason: HOSPADM

## 2025-05-14 RX ORDER — OXYCODONE HYDROCHLORIDE 5 MG/1
5 TABLET ORAL
Status: DISCONTINUED | OUTPATIENT
Start: 2025-05-14 | End: 2025-05-14 | Stop reason: HOSPADM

## 2025-05-14 RX ORDER — HYDROCODONE BITARTRATE AND ACETAMINOPHEN 5; 325 MG/1; MG/1
1 TABLET ORAL EVERY 4 HOURS PRN
Status: DISCONTINUED | OUTPATIENT
Start: 2025-05-14 | End: 2025-05-15 | Stop reason: HOSPADM

## 2025-05-14 RX ORDER — ROCURONIUM BROMIDE 10 MG/ML
INJECTION, SOLUTION INTRAVENOUS
Status: DISCONTINUED | OUTPATIENT
Start: 2025-05-14 | End: 2025-05-14 | Stop reason: SDUPTHER

## 2025-05-14 RX ORDER — NEOSTIGMINE METHYLSULFATE 1 MG/ML
INJECTION INTRAVENOUS
Status: DISCONTINUED | OUTPATIENT
Start: 2025-05-14 | End: 2025-05-14 | Stop reason: SDUPTHER

## 2025-05-14 RX ORDER — FENTANYL CITRATE 50 UG/ML
INJECTION, SOLUTION INTRAMUSCULAR; INTRAVENOUS
Status: DISCONTINUED | OUTPATIENT
Start: 2025-05-14 | End: 2025-05-14 | Stop reason: SDUPTHER

## 2025-05-14 RX ORDER — SODIUM CHLORIDE, SODIUM LACTATE, POTASSIUM CHLORIDE, CALCIUM CHLORIDE 600; 310; 30; 20 MG/100ML; MG/100ML; MG/100ML; MG/100ML
INJECTION, SOLUTION INTRAVENOUS CONTINUOUS
Status: DISCONTINUED | OUTPATIENT
Start: 2025-05-14 | End: 2025-05-14 | Stop reason: HOSPADM

## 2025-05-14 RX ORDER — BACLOFEN 10 MG/1
10 TABLET ORAL 3 TIMES DAILY
Status: DISCONTINUED | OUTPATIENT
Start: 2025-05-14 | End: 2025-05-15 | Stop reason: HOSPADM

## 2025-05-14 RX ORDER — SODIUM CHLORIDE 9 MG/ML
INJECTION, SOLUTION INTRAVENOUS PRN
Status: DISCONTINUED | OUTPATIENT
Start: 2025-05-14 | End: 2025-05-14 | Stop reason: HOSPADM

## 2025-05-14 RX ORDER — LIDOCAINE HYDROCHLORIDE 10 MG/ML
1 INJECTION, SOLUTION INFILTRATION; PERINEURAL
Status: DISCONTINUED | OUTPATIENT
Start: 2025-05-14 | End: 2025-05-14 | Stop reason: HOSPADM

## 2025-05-14 RX ORDER — LIDOCAINE HYDROCHLORIDE 20 MG/ML
INJECTION, SOLUTION EPIDURAL; INFILTRATION; INTRACAUDAL; PERINEURAL
Status: DISCONTINUED | OUTPATIENT
Start: 2025-05-14 | End: 2025-05-14 | Stop reason: SDUPTHER

## 2025-05-14 RX ORDER — DEXAMETHASONE SODIUM PHOSPHATE 10 MG/ML
INJECTION, SOLUTION INTRA-ARTICULAR; INTRALESIONAL; INTRAMUSCULAR; INTRAVENOUS; SOFT TISSUE
Status: DISCONTINUED | OUTPATIENT
Start: 2025-05-14 | End: 2025-05-14 | Stop reason: SDUPTHER

## 2025-05-14 RX ORDER — DEXMEDETOMIDINE HYDROCHLORIDE 100 UG/ML
INJECTION, SOLUTION INTRAVENOUS
Status: DISCONTINUED | OUTPATIENT
Start: 2025-05-14 | End: 2025-05-14 | Stop reason: SDUPTHER

## 2025-05-14 RX ORDER — PHENAZOPYRIDINE HYDROCHLORIDE 95 MG/1
95 TABLET ORAL ONCE
Status: COMPLETED | OUTPATIENT
Start: 2025-05-14 | End: 2025-05-14

## 2025-05-14 RX ORDER — ONDANSETRON 2 MG/ML
4 INJECTION INTRAMUSCULAR; INTRAVENOUS
Status: DISCONTINUED | OUTPATIENT
Start: 2025-05-14 | End: 2025-05-14 | Stop reason: HOSPADM

## 2025-05-14 RX ORDER — MORPHINE SULFATE 4 MG/ML
1 INJECTION, SOLUTION INTRAMUSCULAR; INTRAVENOUS
Status: DISCONTINUED | OUTPATIENT
Start: 2025-05-14 | End: 2025-05-15 | Stop reason: HOSPADM

## 2025-05-14 RX ORDER — DOCUSATE SODIUM 100 MG/1
100 CAPSULE, LIQUID FILLED ORAL 2 TIMES DAILY
Status: DISCONTINUED | OUTPATIENT
Start: 2025-05-14 | End: 2025-05-15 | Stop reason: HOSPADM

## 2025-05-14 RX ORDER — SODIUM CHLORIDE 9 MG/ML
INJECTION, SOLUTION INTRAVENOUS CONTINUOUS
Status: DISCONTINUED | OUTPATIENT
Start: 2025-05-14 | End: 2025-05-15 | Stop reason: HOSPADM

## 2025-05-14 RX ORDER — AMITRIPTYLINE HYDROCHLORIDE 50 MG/1
25 TABLET ORAL NIGHTLY PRN
Status: DISCONTINUED | OUTPATIENT
Start: 2025-05-14 | End: 2025-05-15 | Stop reason: HOSPADM

## 2025-05-14 RX ORDER — ONDANSETRON 2 MG/ML
4 INJECTION INTRAMUSCULAR; INTRAVENOUS EVERY 6 HOURS PRN
Status: DISCONTINUED | OUTPATIENT
Start: 2025-05-14 | End: 2025-05-15 | Stop reason: HOSPADM

## 2025-05-14 RX ORDER — GLYCOPYRROLATE 0.2 MG/ML
INJECTION INTRAMUSCULAR; INTRAVENOUS
Status: DISCONTINUED | OUTPATIENT
Start: 2025-05-14 | End: 2025-05-14 | Stop reason: SDUPTHER

## 2025-05-14 RX ORDER — PROPOFOL 10 MG/ML
INJECTION, EMULSION INTRAVENOUS
Status: DISCONTINUED | OUTPATIENT
Start: 2025-05-14 | End: 2025-05-14 | Stop reason: SDUPTHER

## 2025-05-14 RX ORDER — LABETALOL HYDROCHLORIDE 5 MG/ML
10 INJECTION, SOLUTION INTRAVENOUS
Status: DISCONTINUED | OUTPATIENT
Start: 2025-05-14 | End: 2025-05-14 | Stop reason: HOSPADM

## 2025-05-14 RX ORDER — PROCHLORPERAZINE EDISYLATE 5 MG/ML
5 INJECTION INTRAMUSCULAR; INTRAVENOUS
Status: DISCONTINUED | OUTPATIENT
Start: 2025-05-14 | End: 2025-05-14 | Stop reason: HOSPADM

## 2025-05-14 RX ORDER — ACETAMINOPHEN 500 MG
1000 TABLET ORAL ONCE
Status: COMPLETED | OUTPATIENT
Start: 2025-05-14 | End: 2025-05-14

## 2025-05-14 RX ORDER — LEVOFLOXACIN 5 MG/ML
500 INJECTION, SOLUTION INTRAVENOUS
Status: COMPLETED | OUTPATIENT
Start: 2025-05-14 | End: 2025-05-14

## 2025-05-14 RX ORDER — EPHEDRINE SULFATE 5 MG/ML
INJECTION INTRAVENOUS
Status: DISCONTINUED | OUTPATIENT
Start: 2025-05-14 | End: 2025-05-14 | Stop reason: SDUPTHER

## 2025-05-14 RX ADMIN — AMITRIPTYLINE HYDROCHLORIDE 25 MG: 50 TABLET ORAL at 20:08

## 2025-05-14 RX ADMIN — SODIUM CHLORIDE: 0.9 INJECTION, SOLUTION INTRAVENOUS at 17:13

## 2025-05-14 RX ADMIN — HYDROMORPHONE HYDROCHLORIDE 0.5 MG: 1 INJECTION, SOLUTION INTRAMUSCULAR; INTRAVENOUS; SUBCUTANEOUS at 15:24

## 2025-05-14 RX ADMIN — FENTANYL CITRATE 50 MCG: 50 INJECTION, SOLUTION INTRAMUSCULAR; INTRAVENOUS at 13:36

## 2025-05-14 RX ADMIN — DOCUSATE SODIUM 100 MG: 100 CAPSULE, LIQUID FILLED ORAL at 20:07

## 2025-05-14 RX ADMIN — DEXMEDETOMIDINE 12 MCG: 100 INJECTION, SOLUTION, CONCENTRATE INTRAVENOUS at 13:06

## 2025-05-14 RX ADMIN — ACETAMINOPHEN 1000 MG: 500 TABLET, FILM COATED ORAL at 11:22

## 2025-05-14 RX ADMIN — ROCURONIUM BROMIDE 10 MG: 10 INJECTION, SOLUTION INTRAVENOUS at 13:23

## 2025-05-14 RX ADMIN — LIDOCAINE HYDROCHLORIDE 100 MG: 20 INJECTION, SOLUTION EPIDURAL; INFILTRATION; INTRACAUDAL; PERINEURAL at 12:46

## 2025-05-14 RX ADMIN — HYDROMORPHONE HYDROCHLORIDE 0.5 MG: 1 INJECTION, SOLUTION INTRAMUSCULAR; INTRAVENOUS; SUBCUTANEOUS at 15:02

## 2025-05-14 RX ADMIN — NEOSTIGMINE METHYLSULFATE 5 MG: 1 INJECTION INTRAVENOUS at 14:39

## 2025-05-14 RX ADMIN — DEXAMETHASONE SODIUM PHOSPHATE 10 MG: 10 INJECTION INTRAMUSCULAR; INTRAVENOUS at 12:53

## 2025-05-14 RX ADMIN — PHENAZOPYRIDINE HYDROCHLORIDE 95 MG: 95 TABLET ORAL at 15:24

## 2025-05-14 RX ADMIN — HYDROMORPHONE HYDROCHLORIDE 0.5 MG: 1 INJECTION, SOLUTION INTRAMUSCULAR; INTRAVENOUS; SUBCUTANEOUS at 15:09

## 2025-05-14 RX ADMIN — GLYCOPYRROLATE 0.8 MG: 0.2 INJECTION INTRAMUSCULAR; INTRAVENOUS at 14:39

## 2025-05-14 RX ADMIN — PROPOFOL 200 MG: 10 INJECTION, EMULSION INTRAVENOUS at 12:46

## 2025-05-14 RX ADMIN — FENTANYL CITRATE 50 MCG: 50 INJECTION, SOLUTION INTRAMUSCULAR; INTRAVENOUS at 13:53

## 2025-05-14 RX ADMIN — ONDANSETRON 4 MG: 2 INJECTION, SOLUTION INTRAMUSCULAR; INTRAVENOUS at 12:53

## 2025-05-14 RX ADMIN — EPHEDRINE SULFATE 10 MG: 5 INJECTION INTRAVENOUS at 13:03

## 2025-05-14 RX ADMIN — PROPOFOL 30 MG: 10 INJECTION, EMULSION INTRAVENOUS at 14:39

## 2025-05-14 RX ADMIN — SODIUM CHLORIDE, SODIUM LACTATE, POTASSIUM CHLORIDE, AND CALCIUM CHLORIDE: .6; .31; .03; .02 INJECTION, SOLUTION INTRAVENOUS at 11:21

## 2025-05-14 RX ADMIN — BACLOFEN 10 MG: 10 TABLET ORAL at 20:07

## 2025-05-14 RX ADMIN — ROCURONIUM BROMIDE 40 MG: 10 INJECTION, SOLUTION INTRAVENOUS at 12:46

## 2025-05-14 RX ADMIN — ROCURONIUM BROMIDE 10 MG: 10 INJECTION, SOLUTION INTRAVENOUS at 13:53

## 2025-05-14 RX ADMIN — FENTANYL CITRATE 100 MCG: 50 INJECTION, SOLUTION INTRAMUSCULAR; INTRAVENOUS at 12:45

## 2025-05-14 RX ADMIN — LEVOFLOXACIN 500 MG: 5 INJECTION, SOLUTION INTRAVENOUS at 12:52

## 2025-05-14 RX ADMIN — HYDROMORPHONE HYDROCHLORIDE 0.5 MG: 1 INJECTION, SOLUTION INTRAMUSCULAR; INTRAVENOUS; SUBCUTANEOUS at 16:03

## 2025-05-14 RX ADMIN — EPHEDRINE SULFATE 10 MG: 5 INJECTION INTRAVENOUS at 14:00

## 2025-05-14 ASSESSMENT — PAIN SCALES - GENERAL
PAINLEVEL_OUTOF10: 0
PAINLEVEL_OUTOF10: 7
PAINLEVEL_OUTOF10: 7
PAINLEVEL_OUTOF10: 0
PAINLEVEL_OUTOF10: 8
PAINLEVEL_OUTOF10: 7

## 2025-05-14 ASSESSMENT — PAIN DESCRIPTION - DESCRIPTORS: DESCRIPTORS: DISCOMFORT;HEAVINESS

## 2025-05-14 ASSESSMENT — PAIN - FUNCTIONAL ASSESSMENT
PAIN_FUNCTIONAL_ASSESSMENT: 0-10
PAIN_FUNCTIONAL_ASSESSMENT: 0-10

## 2025-05-14 ASSESSMENT — PAIN DESCRIPTION - ORIENTATION: ORIENTATION: ANTERIOR

## 2025-05-14 ASSESSMENT — PAIN DESCRIPTION - LOCATION: LOCATION: GROIN

## 2025-05-14 ASSESSMENT — LIFESTYLE VARIABLES: SMOKING_STATUS: 1

## 2025-05-14 NOTE — PROGRESS NOTES
4 Eyes Skin Assessment     NAME:  Aurora Wilhelm  YOB: 1965  MEDICAL RECORD NUMBER:  [id number]    The patient is being assessed for  Admission    I agree that at least one RN has performed a thorough Head to Toe Skin Assessment on the patient. ALL assessment sites listed below have been assessed.      Areas assessed by both nurses:    Head, Face, Ears, Shoulders, Back, Chest, Arms, Elbows, Hands, Sacrum. Buttock, Coccyx, Ischium, Legs. Feet and Heels, and Under Medical Devices         Does the Patient have a Wound? No noted wound(s)       Gordo Prevention initiated by RN: Yes  Wound Care Orders initiated by RN: No    Pressure Injury (Stage 3,4, Unstageable, DTI, NWPT, and Complex wounds) if present, place Wound referral order by RN under : No    New Ostomies, if present place, Ostomy referral order under : No     Nurse 1 eSignature: Electronically signed by Tere Braxton RN on 5/14/25 at 5:19 PM EDT    **SHARE this note so that the co-signing nurse can place an eSignature**    Nurse 2 eSignature: Electronically signed by Tere Manriquez RN on 5/14/25 at 5:38 PM EDT

## 2025-05-14 NOTE — OP NOTE
[address]  [address]                            OPERATIVE REPORT      PATIENT NAME: MONICA GUIDRY       : 1965  MED REC NO: [id number]                       ROOM: [facility] NO: [id number]                       ADMIT DATE: 2025  PROVIDER: Roger Keith DO    DATE OF SERVICE:  2025    PREOPERATIVE DIAGNOSES:  Large bladder mass.    POSTOPERATIVE DIAGNOSES:  Large bladder mass.    PROCEDURES PERFORMED:  Transurethral resection of bladder tumor (large).    SURGEON:  Roger Keith DO    ASSISTANT:  None.    ANESTHESIA:  General.    ESTIMATED BLOOD LOSS:  100 mL.    SPECIMENS REMOVED:  Bladder tumor.    INTRAOPERATIVE FINDINGS:  Please see dictated operative note.     COMPLICATIONS:  None immediate.    IMPLANTS:  None.    INDICATIONS:  This is a 59-year-old female who reports a long-standing history of gross hematuria.  Recent CT scan on 2025 shows a greater than 7 cm bladder tumor with right hydronephrosis and atrophy.  All risks, benefits, and alternatives of above-mentioned procedure have been discussed and she is willing to proceed at this time.    DESCRIPTION OF PROCEDURE:  The patient's consent was obtained.  The patient was brought back to the operating room, at which time she was placed in the supine position.  After the uneventful induction of general anesthesia, she was then placed in a dorsal lithotomy position.  Her genital area was prepped and draped and a sterile field applied.  A 28-Maori resectoscope was passed into urethra and advanced to the bladder using obturator guidance.  The bladder was systematically surveyed with resectoscope.  There was a large amount of tumor within her bladder.  Majority of this tumor appeared to arise from the distal half of the bladder.  The dome of the bladder appeared to be free of tumor.  Majority of this tumor also appeared to arise from the right  side of the bladder as compared to the left.  I was able to visualize the left orifice, but was unable to visualize the right orifice as this was covered by tumor.  I began by resecting the right trigone of the bladder first and I attempted to identify the orifice, but was unsuccessful in doing so.  I then began resecting the tumor circumferentially around the distal bladder and bladder neck.  The largest tumor appeared to arise in the right lateral wall of the bladder.  This tumor actually appeared to enter the urethra when the bladder was drained.  With patience and perseverance, I was eventually able to resect most of the visible bladder tumor.  This tumor did appear to be invasive during my resection.  Deep muscle biopsies were obtained mainly over the trigone and right lateral bladder neck.  Total resection time was over 90 minutes.  All tumor was evacuated using resectoscope.  The resection beds were then fulgurated using electrocautery.  Again, I attempted to identify the right orifice, but was unsuccessful in doing so.  The left orifice appeared unharmed by my resection.  Following hemostasis, a 24-Hebrew 3-way catheter was placed to continuous bladder irrigation.  The patient tolerated the procedure well.  She will be observed overnight in the hospital on continuous bladder irrigation.  Total tumor volume was approximately 8-9 cm.        DO EMANUEL VIRK/AQS  D:  05/14/2025 15:54:28  T:  05/14/2025 16:48:12  JOB #:  [id number]

## 2025-05-14 NOTE — BRIEF OP NOTE
Brief Postoperative Note      Patient: Aurora Wilhelm  YOB: 1965  MRN: [id number]    Date of Procedure: 5/14/2025    Pre-Op Diagnosis Codes:      * Bladder mass [N32.89]     * Gross hematuria [R31.0]    Post-Op Diagnosis: Same       Procedure(s):  CYSTOSCOPY TRANSURETHRAL RESECTION BLADDER LARGE    Surgeon(s):  Juan F Domingo DO    Assistant:  * No surgical staff found *    Anesthesia: General    Estimated Blood Loss (mL): 100cc    Complications: none immediate    Specimens:   ID Type Source Tests Collected by Time Destination   A : bladder tumor Tissue Bladder SURGICAL PATHOLOGY Juan F Domingo DO 5/14/2025 1440        Implants:  * No implants in log *      Drains:   Urinary Catheter 05/14/25 Frank (Active)   Catheter Indications Perioperative use for selected surgical procedures 05/14/25 1458   Site Assessment No urethral drainage 05/14/25 1458   Urine Color Pink 05/14/25 1458   Urine Appearance Red flecks 05/14/25 1458   Collection Container Standard 05/14/25 1458   Securement Method Securing device (Describe) 05/14/25 1458   Catheter Best Practices  Drainage tube clipped to bed;Catheter secured to thigh;Tamper seal intact;Bag below bladder;Bag not on floor;Lack of dependent loop in tubing;Drainage bag less than half full 05/14/25 1458   Status Continuous bladder irrigation 05/14/25 1458   Rate Moderate 05/14/25 1506           Electronically signed by JUAN F DOMINGO DO on 5/14/2025 at 3:16 PM

## 2025-05-14 NOTE — PROGRESS NOTES
Spiritual Health History and Assessment/Progress Note  [facility]      Room # MOR/PL    Name: Aurora Wilhelm           Age: 59 y.o.    Gender: female          MRN: [id number]  Jew: Jain       Preferred Language: English      Date: 05/14/25  Visit Time: Begin Time: 1130 End Time : 1140  Complexity of Encounter: Low      Visit Summary:  met with patient and sister in response to request for prayer before procedure. Patient expressed they have supportive family and neighbors that are praying for her and supporting her. Patient expressed a positive outlook.   provided pastoral presence, prayer and empathetic listening.  is available for follow up as needed.     Referral/Consult From: Patient   Encounter Overview/Reason: Pre-Op  Service Provided For: Patient and family together     Patient was available.    Kindra, Belief, Meaning:   Patient identifies as spiritual  is connected with a kindra tradition or spiritual practice  has beliefs or practices that help with coping during difficult times  Family/Friends identifies as spiritual  are connected with a kindra tradition or spiritual practice  have beliefs or practices that help with coping during difficult times  Rituals (if applicable)      Importance and Influence:  Patient does not have spiritual/personal beliefs that influence decisions regarding their health  Family/Friends does not have spiritual/personal beliefs that influence decisions regarding the patient's health    Community:  Patient   is connected with a spiritual community  indicated that they feel well-supported  Support System Includes   Family members   Family/Friends   indicated that they feel well-supported    Assessment and Plan of Care:   Emotions Expressed by Patient:   Assessment: Calm, Coping    Interventions by :   Intervention: Active listening, Sustaining Presence/Ministry of presence, Prayer (assurance of)/Gardiner, Explored/Affirmed  feelings, thoughts, concerns     Result/ Response by Patient:   Outcome: Comfort, Coping, Expressed feelings, needs, and concerns, Receptive, Peace    Patient Plan of Care:         Emotions Expressed by Spouse/Family/Friends:   calm  gratitude     Interventions with Spouse/ Family/Friends include:   active listening  prayer  provided ministry of presence    Spouse/Family/Friends Plan of Care:   Spiritual care available upon referral.      Electronically signed by Chaplain Yennifer, on 5/14/2025 at 12:00 PM.  [facility]  [facility]  [phone]

## 2025-05-14 NOTE — ANESTHESIA POSTPROCEDURE EVALUATION
Department of Anesthesiology  Postprocedure Note    Patient: Aurora Wilhelm  MRN: [id number]  YOB: 1965  Date of evaluation: 5/14/2025    Procedure Summary       Date: 05/14/25 Room / Location: [facility] MAIN OR 01 CYSTO / [facility] MAIN OR    Anesthesia Start: 1234 Anesthesia Stop: 1459    Procedure: CYSTOSCOPY TRANSURETHRAL RESECTION BLADDER (Perineum) Diagnosis:       Bladder mass      Gross hematuria      (Bladder mass [N32.89])      (Gross hematuria [R31.0])    Providers: Roger Keith DO Responsible Provider: Tyler Gonzalez DO    Anesthesia Type: general ASA Status: 3            Anesthesia Type: No value filed.    Nini Phase I: Nini Score: 9    Nini Phase II:      Anesthesia Post Evaluation    Patient location during evaluation: PACU  Level of consciousness: awake and alert  Airway patency: patent  Nausea & Vomiting: no nausea  Cardiovascular status: hemodynamically stable  Respiratory status: acceptable  Hydration status: euvolemic  Comments: Blood pressure 104/75, pulse 55, temperature 97.7 °F (36.5 °C), temperature source Oral, resp. rate 16, height 1.778 m (5' 10\"), weight 100.7 kg (222 lb), SpO2 97%.  Pain management: satisfactory to patient    No notable events documented.

## 2025-05-14 NOTE — H&P
[facility]  [address] [place] [address]  [phone]    Aurora Wilhelm  : 1965     HPI   59 y.o., female returns in follow up for gross hematuria. Pt reports a two yr history of painless gross hematuria. CT on 25 shows a 7.4cm BT with R hydroureteronephrosis and R renal atrophy. No obvious mets seen. Greater than 40 pack yr smoker. No prior abd surgery. Cr was 0.88 on 3/14/25.        Past Medical History:   Diagnosis Date    Elbow dislocation     right    Spinal stenosis of lumbar region with neurogenic claudication     epidural injections    UTI (urinary tract infection)     Wrist fracture     right     Past Surgical History:   Procedure Laterality Date    BUNIONECTOMY Left     FOREARM SURGERY Right 2023    Open reduction, internal fixation right distal radius fracture performed by Rachel Moseley MD at [facility]     Current Facility-Administered Medications   Medication Dose Route Frequency Provider Last Rate Last Admin    lidocaine 1 % injection 1 mL  1 mL IntraDERmal Once PRN Tyler Gonzalez DO        fentaNYL (SUBLIMAZE) injection 100 mcg  100 mcg IntraVENous Once PRN Tyler oGnzalez DO        lactated ringers infusion   IntraVENous Continuous Tyler Gonzalez  mL/hr at 25 1121 New Bag at 25 1121    sodium chloride flush 0.9 % injection 5-40 mL  5-40 mL IntraVENous 2 times per day Tyler Gonzalez DO        sodium chloride flush 0.9 % injection 5-40 mL  5-40 mL IntraVENous PRN Tyler Gonzalez DO        0.9 % sodium chloride infusion   IntraVENous PRN Tyler Gonzalez DO        midazolam PF (VERSED) injection 2 mg  2 mg IntraVENous Once PRN Tyler Gonzalez DO        levoFLOXacin (LEVAQUIN) 500 MG/100ML infusion 500 mg  500 mg IntraVENous On Call to OR Roger Keith DO   Held at 25 1122     No Known Allergies  Social History     Socioeconomic History    Marital status: Single       Pulse 79   Temp 97.2 °F (36.2 °C) (Infrared)   Resp 17   Ht 1.778 m (5' 10\")   Wt 100.7 kg (222 lb)   SpO2 97%   BMI 31.85 kg/m²   General appearance - alert, well appearing, and in no distress  Mental status - alert and oriented  Eyes - extraocular eye movements intact, sclera anicteric  Nose - normal and patent, no erythema or discharge  Mouth - mucous membranes moist  Chest - clear to auscultation bilaterally  Heart - normal rate, regular rhythm  Abdomen - soft, nontender, nondistended, no masses or organomegaly  Neurological - normal speech, no focal findings or movement disorder noted  Skin - normal coloration and turgor      Assessment/Plan  Bladder mass.  She has elected to proceed with a TURBT.  All risks, benefits and alternatives to the above mentioned procedure were discussed and the patient is willing to proceed at this time.      JUAN F DOMINGO, DO

## 2025-05-14 NOTE — ANESTHESIA PROCEDURE NOTES
Airway  Date/Time: 5/14/2025 12:48 PM  Urgency: elective    Airway not difficult    General Information and Staff    Patient location during procedure: OR  Resident/CRNA: Sarah Kincaid APRN - CRNA  Performed: resident/CRNA   Performed by: Sarah Kincaid APRN - CRNA  Authorized by: Tyler Gonzalez DO      Indications and Patient Condition  Indications for airway management: anesthesia  Spontaneous Ventilation: absent  Sedation level: deep  Preoxygenated: yes  Patient position: sniffing  MILS not maintained throughout  Mask difficulty assessment: vent by bag mask + OA or adjuvant +/- NMBA    Final Airway Details  Final airway type: endotracheal airway      Successful airway: ETT  Cuffed: yes   Successful intubation technique: direct laryngoscopy  Facilitating devices/methods: intubating stylet  Endotracheal tube insertion site: oral  Blade: Khloe  Blade size: #4  ETT size (mm): 7.0  Cormack-Lehane Classification: grade I - full view of glottis  Placement verified by: chest auscultation and capnometry   Measured from: lips  Number of attempts at approach: 1  Ventilation between attempts: bag mask  Number of other approaches attempted: 0    no

## 2025-05-14 NOTE — PERIOP NOTE
Encounter Date: 9/3/2017       History     Chief Complaint   Patient presents with    optholmology consult     fell yesterday striking head, blind in left eye, referred to E for eval of rt eye. Coming from [facility] ER. Denies LOC. Dx'd w/ detatched retina- CN aware      HPI   87 year old female transferred for evaluation of acute on chronic R sided visual loss after a fall last night. CT head at OSH concerning for retinal detachment. Here for ophtho evaluation.    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    Review of patient's allergies indicates:   Allergen Reactions    Cosopt [dorzolamide-timolol] Swelling    Mevacor [lovastatin] Nausea And Vomiting    Prednisone Nausea And Vomiting    Vasotec [enalapril maleate] Nausea And Vomiting    Zetia [ezetimibe] Nausea And Vomiting    Furosemide Palpitations     Pt states her heart skips beats when she takes this medication.     Past Medical History:   Diagnosis Date    *Atrial fibrillation     Adrenal adenoma     Atrial fibrillation     Blood clotting tendency     Breast cancer     CVA (cerebral infarction)     DVT (deep venous thrombosis)     Glaucoma     Pseudoexfoliation, sever    Goiter     Heart attack     Hypertension     Hypothyroidism     Long-term (current) use of anticoagulants     Macular hole of right eye     Multinodular goiter     Other hyperparathyroidism     Phthisis bulbi of left eye     Stroke      Past Surgical History:   Procedure Laterality Date    BAERVELDT GLAUCOMA SHUNT Right 10/30/2013    OD (dr. hines)    BREAST LUMPECTOMY      BUNIONECTOMY      CATARACT EXTRACTION W/  INTRAOCULAR LENS IMPLANT Right 01/26/2011    WITH TRAB ()    CATARACT EXTRACTION W/  INTRAOCULAR LENS IMPLANT Left 04/09/2009    ANT. VIITRECTOMY WITH SUTURED PCIOL (DR. RYAN)    CATARACT EXTRACTION W/ INTRAOCULAR LENS IMPLANTW/ TRABECULECTOMY Right 01/26/2011         TRANSFER - OUT REPORT:    Verbal report given to MG Carranza on Aurora Wilhelm  being transferred to [facility] for routine post-op       Report consisted of patient's Situation, Background, Assessment and   Recommendations(SBAR).     Information from the following report(s) Nurse Handoff Report, Adult Overview, Surgery Report, MAR, Cardiac Rhythm NSR, and Neuro Assessment was reviewed with the receiving nurse.           Lines:   Peripheral IV 05/14/25 Posterior;Right Wrist (Active)   Site Assessment Clean, dry & intact 05/14/25 1458   Line Status Infusing 05/14/25 1458   Line Care Connections checked and tightened 05/14/25 1458   Phlebitis Assessment No symptoms 05/14/25 1458   Infiltration Assessment 0 05/14/25 1458   Alcohol Cap Used No 05/14/25 1458   Dressing Status New dressing applied 05/14/25 1458   Dressing Type Transparent 05/14/25 1458   Dressing Intervention New 05/14/25 1119        Opportunity for questions and clarification was provided.      Patient transported with:  O2 @ 3lpm        COLON SURGERY      volvulus    CONJUNCTIVAL FLAP  Left 05/28/2009    DR. RAMOS    conjuunctival flap   2009    OS w/ dr. ramos for k-ulcer    CORNEAL TRANSPLANT Left 04/30/2009    DSEK ()    INTRAOCULAR LENS IMPLANT, SECONDARY W/ ANTERIOR VITRECTOMY Left 04/09/2009        pneumatic maculoplexy  2001    OD w/ dr. emanuel    TRABECULECTOMY Left 01/11/2006        transcleral cyclophotocoagulation   2010    OS w/ dr. hines     Family History   Problem Relation Age of Onset    Emphysema Mother     Hypertension Mother     Hyperlipidemia Mother     Hypertension Father     Hyperlipidemia Father     No Known Problems Sister     No Known Problems Brother     No Known Problems Maternal Aunt     No Known Problems Maternal Uncle     No Known Problems Paternal Aunt     No Known Problems Paternal Uncle     No Known Problems Maternal Grandmother     No Known Problems Maternal Grandfather     No Known Problems Paternal Grandmother     No Known Problems Paternal Grandfather     Melanoma Neg Hx     Psoriasis Neg Hx     Lupus Neg Hx     Eczema Neg Hx     Acne Neg Hx     Amblyopia Neg Hx     Blindness Neg Hx     Cancer Neg Hx     Cataracts Neg Hx     Diabetes Neg Hx     Glaucoma Neg Hx     Macular degeneration Neg Hx     Retinal detachment Neg Hx     Strabismus Neg Hx     Stroke Neg Hx     Thyroid disease Neg Hx      Social History   Substance Use Topics    Smoking status: Former Smoker     Quit date: 9/25/1982    Smokeless tobacco: Never Used    Alcohol use No     Review of Systems   Constitutional: Negative for fever.   HENT: Negative for sore throat.    Eyes: Positive for visual disturbance. Negative for photophobia, pain, discharge and redness.   Respiratory: Negative for shortness of breath.    Cardiovascular: Negative for chest pain.   Gastrointestinal: Negative for nausea.   Genitourinary: Negative for dysuria.   Musculoskeletal: Negative for back pain.   Skin:  Negative for rash.   Neurological: Negative for weakness and headaches.   Hematological: Does not bruise/bleed easily.   All other systems reviewed and are negative.      Physical Exam     Initial Vitals [09/03/17 1330]   BP Pulse Resp Temp SpO2   (!) 197/104 77 16 97.7 °F (36.5 °C) 100 %      MAP       135         Physical Exam    Nursing note and vitals reviewed.  Constitutional: She appears well-developed and well-nourished. She is not diaphoretic.   Frail, elderly, NAD   HENT:   Head: Normocephalic and atraumatic.   Right Ear: External ear normal.   Left Ear: External ear normal.   Nose: Nose normal.   Eyes: Right eye exhibits no discharge. Left eye exhibits no discharge. No scleral icterus.   R eye - pupil 3mm reactive  L eye - cataract obscures pupil  Acuity - R CF, L LP  PTono - R 16  US - convex contour R retina but no obvious detachment, no papilledema         ED Course   Procedures  Labs Reviewed   COMPREHENSIVE METABOLIC PANEL - Abnormal; Notable for the following:        Result Value    Sodium 129 (*)     Chloride 93 (*)     Total Bilirubin 1.2 (*)     AST 42 (*)     eGFR if  52.2 (*)     eGFR if non  45.3 (*)     All other components within normal limits   PROTIME-INR - Abnormal; Notable for the following:     Prothrombin Time 22.5 (*)     INR 2.2 (*)     All other components within normal limits   CBC W/ AUTO DIFFERENTIAL - Abnormal; Notable for the following:     MCH 31.6 (*)     Gran # 8.2 (*)     Lymph # 0.9 (*)     Gran% 80.2 (*)     Lymph% 9.2 (*)     All other components within normal limits   APTT                   APC / Resident Notes:   HO4 MDM:  86yo F with acute worsening of R eye vision loss. On coumadin. S/p mechanical trip fall yesterday.  DDx - retinal detachment, subretinal hematoma, lens detachment. Ptono r/o acute glaucoma  CT head at OSH reviewed - no acute intracranial hemorrhage  Labs ordered as presence of retinal hematoma could be 2/2  supratherapeutic INR    Tristan SAMPSONU EM PGY4  9/3/2017 3:52 PM         Update -     Ophtho eval delayed  - he has had several consults in ED.   He is at bedside with Ms Lew now.    Tristan Tejeda  [employer] EM PGY4  9/3/2017 6:08 PM      Update -     After fundoscopy, ophtho team feels anomaly is c/w her lens settled against her retina. No emergent procedure indicated.  Continue eye drops R eye for dry eye.  F/u in [facility] clinic in 1-2 weeks, they will contact her.   RTED for any further worsening of vision loss, other concerns.  Counseled patient on plan, she understands and has no questions.    Tristan SAMPSONU EM PGY4  9/3/2017 8:47 PM                     ED Course      Clinical Impression:   The encounter diagnosis was Intraocular lens dislocation, initial encounter.                           Tristan Tejeda MD  Resident  09/03/17 2969

## 2025-05-14 NOTE — ANESTHESIA PRE PROCEDURE
Department of Anesthesiology  Preprocedure Note       Name:  Aurora Wilhelm   Age:  59 y.o.  :  1965                                          MRN:  [id number]         Date:  2025      Surgeon: Surgeon(s):  Roger Keith DO    Procedure: Procedure(s):  CYSTOSCOPY TRANSURETHRAL RESECTION BLADDER    Medications prior to admission:   Prior to Admission medications    Medication Sig Start Date End Date Taking? Authorizing Provider   nicotine polacrilex (COMMIT) 4 MG lozenge Take 1 lozenge by mouth as needed for Smoking cessation   Yes ProviderePdro MD   amitriptyline (ELAVIL) 25 MG tablet Take 1 tablet by mouth nightly  Patient taking differently: Take 1 tablet by mouth nightly as needed 8/3/23  Yes Friend, Rachel KILLIAN MD   baclofen (LIORESAL) 10 MG tablet Take 1 tablet by mouth 3 times daily Takes 4-5 times per day 3/31/23  Yes ProviderPedro MD   Multiple Vitamins-Minerals (CENTRUM SILVER) CHEW Take by mouth daily   Yes ProviderPedro MD   Menthol, Topical Analgesic, (BIOFREEZE ROLL-ON EX) Apply topically as needed Back and neck   Yes ProviderPedro MD   NONFORMULARY as needed CBD and delta gummies   Yes ProviderPedro MD   acetaminophen (TYLENOL) 500 MG tablet Take 1 tablet by mouth every 6 hours as needed for Pain   Yes ProviderPedro MD   ibuprofen (ADVIL;MOTRIN) 200 MG tablet Take 2 tablets by mouth as needed for Pain  Patient not taking: Reported on 2025    ProviderPedro MD       Current medications:    Current Facility-Administered Medications   Medication Dose Route Frequency Provider Last Rate Last Admin    lidocaine 1 % injection 1 mL  1 mL IntraDERmal Once PRN Tyler Gonzalez DO        fentaNYL (SUBLIMAZE) injection 100 mcg  100 mcg IntraVENous Once PRN Tyler Gonzalez DO        lactated ringers infusion   IntraVENous Continuous Tyler Gonzalez  mL/hr at 25 1121 New Bag at 25 1121    sodium

## 2025-05-14 NOTE — PROGRESS NOTES
TRANSFER - IN REPORT:    Verbal report received from [facility] on Aurora Wilhelm  being received from PACU for routine progression of patient care      Report consisted of patient's Situation, Background, Assessment and   Recommendations(SBAR).     Information from the following report(s) Nurse Handoff Report was reviewed with the receiving nurse.    Opportunity for questions and clarification was provided.      Assessment completed upon patient's arrival to unit and care assumed.

## 2025-05-15 VITALS
BODY MASS INDEX: 31.78 KG/M2 | HEART RATE: 70 BPM | DIASTOLIC BLOOD PRESSURE: 77 MMHG | OXYGEN SATURATION: 92 % | WEIGHT: 222 LBS | TEMPERATURE: 98.2 F | HEIGHT: 70 IN | SYSTOLIC BLOOD PRESSURE: 108 MMHG | RESPIRATION RATE: 16 BRPM

## 2025-05-15 LAB
ANION GAP SERPL CALC-SCNC: 10 MMOL/L (ref 7–16)
BUN SERPL-MCNC: 9 MG/DL (ref 6–23)
CALCIUM SERPL-MCNC: 8.4 MG/DL (ref 8.8–10.2)
CHLORIDE SERPL-SCNC: 105 MMOL/L (ref 98–107)
CO2 SERPL-SCNC: 21 MMOL/L (ref 20–29)
CREAT SERPL-MCNC: 0.82 MG/DL (ref 0.6–1.1)
GLUCOSE SERPL-MCNC: 130 MG/DL (ref 70–99)
HCT VFR BLD AUTO: 38.4 % (ref 35.8–46.3)
HGB BLD-MCNC: 12.3 G/DL (ref 11.7–15.4)
POTASSIUM SERPL-SCNC: 4.5 MMOL/L (ref 3.5–5.1)
SODIUM SERPL-SCNC: 136 MMOL/L (ref 136–145)

## 2025-05-15 PROCEDURE — 6370000000 HC RX 637 (ALT 250 FOR IP): Performed by: UROLOGY

## 2025-05-15 PROCEDURE — 85018 HEMOGLOBIN: CPT

## 2025-05-15 PROCEDURE — 2580000003 HC RX 258: Performed by: UROLOGY

## 2025-05-15 PROCEDURE — 36415 COLL VENOUS BLD VENIPUNCTURE: CPT

## 2025-05-15 PROCEDURE — 80048 BASIC METABOLIC PNL TOTAL CA: CPT

## 2025-05-15 PROCEDURE — 99232 SBSQ HOSP IP/OBS MODERATE 35: CPT | Performed by: UROLOGY

## 2025-05-15 PROCEDURE — 85014 HEMATOCRIT: CPT

## 2025-05-15 RX ADMIN — DOCUSATE SODIUM 100 MG: 100 CAPSULE, LIQUID FILLED ORAL at 07:25

## 2025-05-15 RX ADMIN — BACLOFEN 10 MG: 10 TABLET ORAL at 07:25

## 2025-05-15 RX ADMIN — BACLOFEN 10 MG: 10 TABLET ORAL at 13:18

## 2025-05-15 RX ADMIN — SODIUM CHLORIDE: 0.9 INJECTION, SOLUTION INTRAVENOUS at 05:46

## 2025-05-15 RX ADMIN — HYDROCODONE BITARTRATE AND ACETAMINOPHEN 1 TABLET: 5; 325 TABLET ORAL at 13:18

## 2025-05-15 ASSESSMENT — PAIN DESCRIPTION - PAIN TYPE: TYPE: CHRONIC PAIN

## 2025-05-15 ASSESSMENT — PAIN SCALES - GENERAL
PAINLEVEL_OUTOF10: 6
PAINLEVEL_OUTOF10: 3
PAINLEVEL_OUTOF10: 0

## 2025-05-15 ASSESSMENT — PAIN DESCRIPTION - DESCRIPTORS
DESCRIPTORS: DISCOMFORT;SORE
DESCRIPTORS: DULL

## 2025-05-15 ASSESSMENT — PAIN DESCRIPTION - ORIENTATION
ORIENTATION: LEFT
ORIENTATION: ANTERIOR

## 2025-05-15 ASSESSMENT — PAIN - FUNCTIONAL ASSESSMENT
PAIN_FUNCTIONAL_ASSESSMENT: ACTIVITIES ARE NOT PREVENTED
PAIN_FUNCTIONAL_ASSESSMENT: ACTIVITIES ARE NOT PREVENTED

## 2025-05-15 ASSESSMENT — PAIN DESCRIPTION - LOCATION
LOCATION: ABDOMEN
LOCATION: BACK;ABDOMEN

## 2025-05-15 NOTE — CARE COORDINATION
05/15/25 1239   Service Assessment   Patient Orientation Alert and Oriented   Cognition Alert   History Provided By Patient   Primary Caregiver Self   Support Systems Family Members   Patient's Healthcare Decision Maker is: Legal Next of Kin   PCP Verified by CM Yes   Last Visit to PCP Within last year   Prior Functional Level Independent in ADLs/IADLs   Current Functional Level Independent in ADLs/IADLs   Can patient return to prior living arrangement Yes   Ability to make needs known: Good   Family able to assist with home care needs: Yes   Would you like for me to discuss the discharge plan with any other family members/significant others, and if so, who? Yes   Financial Resources None   Social/Functional History   Lives With Alone   Receives Help From Family   Prior Level of Assist for ADLs Independent   Prior Level of Assist for Homemaking Independent   Ambulation Assistance Independent   Prior Level of Assist for Transfers Independent   Active  Yes   Discharge Planning   Living Arrangements Alone   Services At/After Discharge   Confirm Follow Up Transport Family     CM met with pt, demographics and PCP verified, pt lives alone, has relative at bedside to transport pt home, to discharge home today, drives, no insurance, no DME or h/h, no rxs ordered so [facility] Rxs not needed. No other needs identified.

## 2025-05-15 NOTE — PROGRESS NOTES
Admit Date: 5/14/2025    Subjective:     Patient has no new complaints.     Objective:     Patient Vitals for the past 8 hrs:   BP Temp Temp src Pulse Resp SpO2   05/15/25 0707 108/77 98.2 °F (36.8 °C) Oral 70 16 92 %     05/15 0701 - 05/15 1900  In: 240 [P.O.:240]  Out: 250 [Urine:250]  05/13 1901 - 05/15 0700  In: 849.2 [I.V.:849.2]  Out: 1500 [Urine:1500]    Physical Exam:  GENERAL ASSESSMENT: alert, oriented to person, place and time, no acute distress and no anxiety, depression or agitation  Chest: Easy work of breathing  CVS exam: RRR  ABDOMEN: not done  Neurological exam reveals alert, oriented, normal speech, no focal findings or movement disorder noted.  FEMALE GENITOURINARY EXAM: Naik draining pink urine  MALE GENITAL EXAM: not done          Data Review   Recent Results (from the past 24 hours)   Hemoglobin and Hematocrit    Collection Time: 05/14/25  3:32 PM   Result Value Ref Range    Hemoglobin 13.7 11.7 - 15.4 g/dL    Hematocrit 41.3 35.8 - 46.3 %   Basic Metabolic Panel    Collection Time: 05/15/25  3:42 AM   Result Value Ref Range    Sodium 136 136 - 145 mmol/L    Potassium 4.5 3.5 - 5.1 mmol/L    Chloride 105 98 - 107 mmol/L    CO2 21 20 - 29 mmol/L    Anion Gap 10 7 - 16 mmol/L    Glucose 130 (H) 70 - 99 mg/dL    BUN 9 6 - 23 MG/DL    Creatinine 0.82 0.60 - 1.10 MG/DL    Est, Glom Filt Rate 82 >60 ml/min/1.73m2    Calcium 8.4 (L) 8.8 - 10.2 MG/DL   Hemoglobin and Hematocrit    Collection Time: 05/15/25  3:42 AM   Result Value Ref Range    Hemoglobin 12.3 11.7 - 15.4 g/dL    Hematocrit 38.4 35.8 - 46.3 %       No results found.      Assessment:     Principal Problem:    Gross hematuria  Active Problems:    Bladder mass    H/O transurethral resection of bladder tumor (TURBT)  Resolved Problems:    * No resolved hospital problems. *    60 yo female with gross hematuria.  S/P Transurethral resection of bladder tumor POD 1.  Cr 0.82, hgb 12.3.  AF, VSS   Plan:   Removed naik for voiding  trial.  Ambulate.  IS.   Push PO hydration.  Home today after VT.  Follow up in 2 weeks.       NICOLE MEDRANO, APRN - CNP  [facility]    Phone: [phone]    I have reviewed the above note and examined the patient.  I agree with the exam, assessment and plan.  No complaints.  AF.  VSS.  Abd soft, nT, ND.  Labs reviewed.  S/P TURBT POD#1.  Home after voiding trial.  Will call pt with path.  OR findings reviewed in detail.    Roger Keith, DO\                Pulses equal bilaterally, no edema present.

## 2025-05-15 NOTE — DISCHARGE INSTRUCTIONS
Learning About Transurethral Resection of the Bladder  What is transurethral resection of the bladder?     Transurethral resection of the bladder is a surgery that removes abnormal tissue (tumor) from the bladder through the urethra. It is also called transurethral resection of bladder tumor, or TURBT.  A tumor in the bladder may be benign (not cancer) or malignant (cancer). This surgery uses a special tool to find and remove a tumor from the bladder. A small sample (biopsy) of the lining of the bladder may also be taken. Any removed tissue will be checked for cancer cells.  This surgery may be done to look for cancer. It is also the most common and effective treatment for early-stage bladder cancer. It may also work well for more advanced cancer if all the cancer can be removed and biopsies show that no cancer cells remain.  How is transurethral resection of the bladder done?  Your doctor will give you medicine to make you sleep or feel relaxed. You will not feel pain.  The doctor will put a thin, lighted tool called a cystoscope, or scope, into your urethra. The urethra is the tube that carries urine from the bladder to the outside of the body. Then the doctor will gently guide the scope into your bladder. Your bladder will then be filled with fluid. This stretches the bladder so that your doctor can clearly see the inside of your bladder. Your doctor will use small surgical tools through the scope to remove and/or burn away any abnormal tissue that is found.  What can you expect after surgery?  You may go home the same day as your surgery or stay in the hospital for an extra day or so. Your doctor may leave a small tube called a catheter in the urethra to help prevent blockage of the urethra. It's often removed before you go home. If not, you'll get instructions on how to care for the catheter.  You may feel the need to urinate often for a while after the surgery. But this should improve with time. It may

## 2025-05-15 NOTE — PROGRESS NOTES
Discharge instructions given. Education provided. All questions answered and verbally voiced understanding. Medication changes and follow up appointments discussed. Patient voiding WNL at this time and has had a BM. Sister here at bedside and ready to take patient home. IV access out. All needs met at this time.

## 2025-05-15 NOTE — DISCHARGE SUMMARY
Discharge Summary    Date: 5/15/2025  Patient Name: Aurora Wilhelm    YOB: 1965     Age: 59 y.o.    Admit Date: 5/14/2025  Discharge Date: 5/15/2025  Discharge Condition: Stable    Admission Diagnosis  Bladder mass [N32.89];Gross hematuria [R31.0];H/O transurethral resection of bladder tumor (TURBT) [Z98.890, Z86.03]      Discharge Diagnosis  Principal Problem:    Gross hematuria  Active Problems:    Bladder mass    H/O transurethral resection of bladder tumor (TURBT)  Resolved Problems:    * No resolved hospital problems. *      Hospital Stay  Narrative of Hospital Course:  The patient underwent transurethral resection of prostate. Procedure was without complications. Following the procedure, patient was transferred to PACU and admitted to the urology floor for convalescence. Postoperatively, the patient progressed as expected. CBI was weaned as urine cleared. On the day of discharge, naik was removed, and patient passed voiding trial. Follow in the office.    Consultants:  None    Surgeries/procedures Performed:  CYSTOSCOPY TRANSURETHRAL RESECTION BLADDER LARGE     Treatments:    Analgesia, IV Hydration, Surgery and Antibiotics        Discharge Plan/Disposition:  Home    Hospital/Incidental Findings Requiring Follow Up:    Patient Instructions:    Diet:    Activity:No Heavy Lifting  For number of days (if applicable):      Other Instructions:    Provider Follow-Up:   No follow-ups on file.     Significant Diagnostic Studies:    Recent Labs:  Admission on 05/14/2025  Hemoglobin                                    Date: 05/14/2025  Value: 13.7        Ref range: 11.7 - 15.4 g/dL   Status: Final  Hematocrit                                    Date: 05/14/2025  Value: 41.3        Ref range: 35.8 - 46.3 %      Status: Final  Sodium                                        Date: 05/15/2025  Value: 136         Ref range: 136 - 145 mmol/L   Status: Final  Potassium

## 2025-05-27 ENCOUNTER — TELEPHONE (OUTPATIENT)
Dept: UROLOGY | Age: 60
End: 2025-05-27

## 2025-05-27 ENCOUNTER — RESULTS FOLLOW-UP (OUTPATIENT)
Dept: UROLOGY | Age: 60
End: 2025-05-27

## 2025-05-27 DIAGNOSIS — C67.9 MALIGNANT NEOPLASM OF URINARY BLADDER, UNSPECIFIED SITE (HCC): Primary | ICD-10-CM

## 2025-05-28 ENCOUNTER — OFFICE VISIT (OUTPATIENT)
Dept: ONCOLOGY | Age: 60
End: 2025-05-28

## 2025-05-28 VITALS
TEMPERATURE: 98.2 F | SYSTOLIC BLOOD PRESSURE: 113 MMHG | HEIGHT: 69 IN | OXYGEN SATURATION: 98 % | HEART RATE: 77 BPM | DIASTOLIC BLOOD PRESSURE: 76 MMHG | RESPIRATION RATE: 18 BRPM | BODY MASS INDEX: 32.11 KG/M2 | WEIGHT: 216.8 LBS

## 2025-05-28 DIAGNOSIS — N32.89 BLADDER MASS: ICD-10-CM

## 2025-05-28 DIAGNOSIS — Z98.890 H/O TRANSURETHRAL RESECTION OF BLADDER TUMOR (TURBT): ICD-10-CM

## 2025-05-28 DIAGNOSIS — Z86.03 H/O TRANSURETHRAL RESECTION OF BLADDER TUMOR (TURBT): ICD-10-CM

## 2025-05-28 DIAGNOSIS — C67.9 MALIGNANT NEOPLASM OF URINARY BLADDER, UNSPECIFIED SITE (HCC): Primary | ICD-10-CM

## 2025-05-28 PROCEDURE — 99205 OFFICE O/P NEW HI 60 MIN: CPT | Performed by: INTERNAL MEDICINE

## 2025-05-28 NOTE — PROGRESS NOTES
[facility]: Office Visit New Patient H & P    Chief Complaint:    Chief Complaint   Patient presents with    New Patient         History of Present Illness:  History of Present Illness  The patient, a 59-year-old female, presents as a new patient with a diagnosis of bladder cancer. Initially, she presented to primary care in March 2025 following an urgent care visit in February 2025, where she was diagnosed with hematuria. At that time, she was empirically treated for a urinary tract infection. A computed tomography (CT) scan of the abdomen and pelvis performed on 03/26/2025 revealed an irregularly contoured mass in the bladder measuring up to 7.4 cm, accompanied by irregular wall thickening, moderate to large right hydronephrosis, and right renal atrophy. Additionally, a 7 mm hepatic focus was noted, deemed too small for further evaluation. She was subsequently referred to Dr. Keith, whom she consulted on 04/03/2025. A cystoscopy performed during this consultation revealed a large bladder tumor. On 05/14/2025, she underwent transurethral resection of the bladder tumor (TURBT), which confirmed the presence of high-grade papillary urothelial carcinoma with an inverted growth pattern and nests of tumor extending between smooth muscle bundles in one area. The pathology report indicated a differential diagnosis that included the possibility of muscularis propria invasion. She has now been referred to medical oncology for further management recommendations. She also has an appointment with Dr. Chaney on 06/09/2025. She reports an improvement in her condition, with no current urinary frequency.      Review of Systems:  Constitutional: Negative.   HENT: Negative.   Eyes: Negative.   Respiratory: Negative.   Cardiovascular: Negative.   Gastrointestinal: Negative.   Genitourinary: Negative.   Musculoskeletal: Negative.   Skin: Negative.   Neurological: Negative.   Endo/Heme/Allergies: Negative.

## 2025-05-28 NOTE — PATIENT INSTRUCTIONS
Patient Information from Today's Visit    The members of your Oncology Medical Home are listed below:    Physician Provider: Dr. Jeferson Harris  Advanced Practice Clinician: Madelin Tan  Registered Nurse: Abundio GARCES   Nurse Navigator: Renate MURPHY RN  Medical Assistant: Sara \"Winsome\" JACQUES   : Gisela \"Loreta\" JOSE.   Supportive Care Services: [facility] [place]    Diagnosis (Information Sheet Provided on Day of Diagnosis): Bladder Cancer    Follow Up Instructions:   After PET scan and consult with Dr. Chaney    Has Treatment Plan Been Finalized? No    Current Labs: No labs drawn today.      Please refer to After Visit Summary or MyChart for upcoming appointment information. Please call our office for rescheduling needs at least 24 hours before your scheduled appointment time.If you have any questions regarding your upcoming schedule please reach out to your care team through LISNR or call [phone].     Please notify your assigned Nurse Navigator of any unplanned hospital admissions or Emergency Room visits within 24 hours of discharge.    -------------------------------------------------------------------------------------------------------------------  Please call our office at [phone] if you have any  of the following symptoms:   Fever of 100.5 or greater  Chills  Shortness of breath  Swelling or pain in one leg    After office hours an answering service is available and will contact a provider for emergencies or if you are experiencing any of the above symptoms.        ABUNDIO CARIAS RN

## 2025-05-28 NOTE — PROGRESS NOTES
NEW PATIENT ABSTRACT    Referral Diagnosis: Malignant neoplasm of urinary bladder    Referring Provider: Roger Keith DO    Primary Care Provider: Renate Sahu, SOFIA - CNP    Presenting Symptoms: gross hematuria    Family History of Cancer: Cancer-related family history includes Cancer in her brother and mother; Lung Cancer in her father.    Past Medical History:   Past Medical History:   Diagnosis Date    Elbow dislocation     right    Spinal stenosis of lumbar region with neurogenic claudication 2022    epidural injections    UTI (urinary tract infection)     Wrist fracture     right       Chronological History of Pertinent Events (From Onset of Presenting Symptoms):  3/14/25: New pt visit to establish care with new PCP, Renate Sahu NP. Pt was seen in urgent care in 2/2025 for hematuria. Started on Macrobid. CT AP w/IV contrast ordered.  3/26/25: CT AP w/IV contrast:  IMPRESSION:  1. Irregular contoured mass in the bladder measuring 6.5 x 7.4 x 4.8 cm. While  this may be due in part to hematoma, given history of gross hematuria,  outpatient work-up recommended to evaluate for underlying malignancy.  2. Irregular wall thickening throughout the bladder. Correlate with presentation  to exclude infection.  3. Moderate to large right hydroureteronephrosis of uncertain chronicity given  atrophic appearance of the right kidney. This may be due to obstruction at the  level of the ureterovesicular junction.  4. Too small to characterize low to intermediate attenuation focus measuring 0.7  cm in the liver. Follow-up recommended.  5. Other findings as described.  4/1/25: Referred to PGU by Renate Sahu NP  4/3/25: New pt consult with Dr. Keith for a two year history of painless gross hematuria. TURBT recommended.  5/14/25: TURBT (large) by Dr. Keith  PATH:  Final Pathologic Diagnosis   A: Bladder tumor, transurethral resection:   Papillary urothelial carcinoma, high-grade with inverted growth pattern,   cannot

## 2025-06-03 ENCOUNTER — TUMOR BOARD CONFERENCE (OUTPATIENT)
Dept: CASE MANAGEMENT | Age: 60
End: 2025-06-03

## 2025-06-03 DIAGNOSIS — C67.9 UROTHELIAL CARCINOMA OF BLADDER (HCC): Primary | ICD-10-CM

## 2025-06-03 NOTE — TUMOR BOARD NOTE
Genitourinary Multidisciplinary Conference Summary    Specialties Present: Genetics, Medical Oncology, Navigation, Pathology, Radiation Oncology, Radiology, Urologic Oncology, and Urology    Treatment to Date: TURBT    Imaging Reviewed: CT & MRI    Recommendations: Probable T2 disease with high concern for MIBC. Pt will complete staging (PET on 6/6/25). Treatment per [facility] protocol. Referral to genetics.

## 2025-06-06 ENCOUNTER — HOSPITAL ENCOUNTER (OUTPATIENT)
Dept: PET IMAGING | Age: 60
Discharge: HOME | End: 2025-06-09
Payer: MEDICAID

## 2025-06-06 DIAGNOSIS — N32.89 BLADDER MASS: ICD-10-CM

## 2025-06-06 DIAGNOSIS — Z98.890 H/O TRANSURETHRAL RESECTION OF BLADDER TUMOR (TURBT): ICD-10-CM

## 2025-06-06 DIAGNOSIS — C67.9 MALIGNANT NEOPLASM OF URINARY BLADDER, UNSPECIFIED SITE (HCC): ICD-10-CM

## 2025-06-06 DIAGNOSIS — Z86.03 H/O TRANSURETHRAL RESECTION OF BLADDER TUMOR (TURBT): ICD-10-CM

## 2025-06-06 LAB
GLUCOSE BLD STRIP.AUTO-MCNC: 93 MG/DL (ref 65–100)
SERVICE CMNT-IMP: NORMAL

## 2025-06-06 PROCEDURE — 78815 PET IMAGE W/CT SKULL-THIGH: CPT

## 2025-06-06 PROCEDURE — 3430000000 HC RX DIAGNOSTIC RADIOPHARMACEUTICAL: Performed by: INTERNAL MEDICINE

## 2025-06-06 PROCEDURE — 6360000004 HC RX CONTRAST MEDICATION: Performed by: INTERNAL MEDICINE

## 2025-06-06 PROCEDURE — A9609 HC RX DIAGNOSTIC RADIOPHARMACEUTICAL: HCPCS | Performed by: INTERNAL MEDICINE

## 2025-06-06 PROCEDURE — 2500000003 HC RX 250 WO HCPCS: Performed by: INTERNAL MEDICINE

## 2025-06-06 PROCEDURE — 82962 GLUCOSE BLOOD TEST: CPT

## 2025-06-06 RX ORDER — SODIUM CHLORIDE 0.9 % (FLUSH) 0.9 %
10 SYRINGE (ML) INJECTION AS NEEDED
Status: DISCONTINUED | OUTPATIENT
Start: 2025-06-06 | End: 2025-06-10 | Stop reason: HOSPADM

## 2025-06-06 RX ORDER — FLUDEOXYGLUCOSE F 18 200 MCI/ML
12.35 INJECTION, SOLUTION INTRAVENOUS
Status: COMPLETED | OUTPATIENT
Start: 2025-06-06 | End: 2025-06-06

## 2025-06-06 RX ORDER — DIATRIZOATE MEGLUMINE AND DIATRIZOATE SODIUM 660; 100 MG/ML; MG/ML
10 SOLUTION ORAL; RECTAL
Status: DISCONTINUED | OUTPATIENT
Start: 2025-06-06 | End: 2025-06-10 | Stop reason: HOSPADM

## 2025-06-06 RX ADMIN — DIATRIZOATE MEGLUMINE AND DIATRIZOATE SODIUM 10 ML: 660; 100 LIQUID ORAL; RECTAL at 12:57

## 2025-06-06 RX ADMIN — SODIUM CHLORIDE, PRESERVATIVE FREE 10 ML: 5 INJECTION INTRAVENOUS at 12:57

## 2025-06-06 RX ADMIN — FLUDEOXYGLUCOSE F 18 12.35 MILLICURIE: 200 INJECTION, SOLUTION INTRAVENOUS at 12:57

## 2025-06-08 ENCOUNTER — RESULTS FOLLOW-UP (OUTPATIENT)
Dept: ONCOLOGY | Age: 60
End: 2025-06-08

## 2025-06-09 ENCOUNTER — OFFICE VISIT (OUTPATIENT)
Age: 60
End: 2025-06-09
Payer: MEDICAID

## 2025-06-09 ENCOUNTER — PREP FOR PROCEDURE (OUTPATIENT)
Dept: ONCOLOGY | Age: 60
End: 2025-06-09

## 2025-06-09 VITALS
TEMPERATURE: 97.7 F | WEIGHT: 219.2 LBS | DIASTOLIC BLOOD PRESSURE: 83 MMHG | OXYGEN SATURATION: 97 % | RESPIRATION RATE: 18 BRPM | HEART RATE: 81 BPM | BODY MASS INDEX: 32.47 KG/M2 | HEIGHT: 69 IN | SYSTOLIC BLOOD PRESSURE: 147 MMHG

## 2025-06-09 DIAGNOSIS — C67.9 MALIGNANT NEOPLASM OF URINARY BLADDER, UNSPECIFIED SITE (HCC): Primary | ICD-10-CM

## 2025-06-09 PROCEDURE — 99214 OFFICE O/P EST MOD 30 MIN: CPT | Performed by: UROLOGY

## 2025-06-09 ASSESSMENT — PATIENT HEALTH QUESTIONNAIRE - PHQ9
2. FEELING DOWN, DEPRESSED OR HOPELESS: NOT AT ALL
SUM OF ALL RESPONSES TO PHQ QUESTIONS 1-9: 0
1. LITTLE INTEREST OR PLEASURE IN DOING THINGS: NOT AT ALL
SUM OF ALL RESPONSES TO PHQ QUESTIONS 1-9: 0

## 2025-06-09 ASSESSMENT — ENCOUNTER SYMPTOMS
GASTROINTESTINAL NEGATIVE: 1
RESPIRATORY NEGATIVE: 1

## 2025-06-09 NOTE — PROGRESS NOTES
in more detail once we have more information.          ICD-10-CM    1. Malignant neoplasm of urinary bladder, unspecified site (HCC)  C67.9             We discussed the natural history of bladder cancer, and the difference between muscle-invasive and non-muscle invasive urothelial cancer.  We discussed in detail the risks and benefits of transurethral resection of bladder tumor (TURBT).  We discussed the chance of bladder  perforation and need for open repair.  There is a small risk of damage to the urethra or ureteral orifices.  We discussed the risk of general anesthesia and other operative risks including but not limited to; MI, stroke, DVT/PE, bleeding, infection, pain/LUTS, tumor recurrence, other cardiovascular, pulmonary, neurologic, and renal complications.  A naik catheter will likely be left in place after the procedure.  All of the patient's questions were answered and they wish to proceed with TURBT. We also discussed that we may perform retrograde pyelograms at the time of the procedure.      PLAN:   -Records reviewed  -Potential treatment options discussed briefly  -Proceed with repeat TURBT(next Tuesday)  ________________________________________      I have seen and examined this patient.  I have reviewed and edited the note started by the MA and agree with the outlined plan.  Part of this note was written by using a voice dictation software. The note has been proof read but may still contain some grammatical/other typographical errors.      Momo Chaney MD  Urologic Oncology  [facility]

## 2025-06-09 NOTE — PATIENT INSTRUCTIONS
Patient Information from Today's Visit    The members of your Oncology Medical Home are listed below:    Physician Provider: Andrea Cahney, Urologic Oncologist  Advanced Practice Clinician: CYRIL Cabezas  Nurse Navigator: Renate MURPHY RN  Medical Assistant: Sybil GRIFFIN CMA  :Gisela MURPHY  Supportive Care Services: Caren NGUYEN LMSW    Diagnosis (Information Sheet Provided on Day of Diagnosis): Bladder    Follow Up Instructions:   Records reviewed  Potential treatment options discussed briefly   We would like to repeat a TURBT(next available)  If you need anything prior to the procedure please do not hesitate to call our office.    We discussed the natural history of bladder cancer, and the difference between muscle-invasive and non-muscle invasive urothelial cancer.  We discussed in detail the risks and benefits of transurethral resection of bladder tumor (TURBT).  We discussed the chance of bladder  perforation and need for open repair.  There is a small risk of damage to the urethra or ureteral orifices.  We discussed the risk of general anesthesia and other operative risks including but not limited to; MI, stroke, DVT/PE, bleeding, infection, pain/LUTS, tumor recurrence, other cardiovascular, pulmonary, neurologic, and renal complications.  A naik catheter will likely be left in place after the procedure.  All of the patient's questions were answered and they wish to proceed with TURBT. We also discussed that we may perform retrograde pyelograms at the time of the procedure.      Has Treatment Plan Been Finalized? No    Current Labs:   No visits with results within 3 Day(s) from this visit.   Latest known visit with results is:   Hospital Outpatient Visit on 06/06/2025   Component Date Value Ref Range Status    POC Glucose 06/06/2025 93  65 - 100 mg/dL Final    Comment: 47 - 60 mg/dl Consistent with, but not fully diagnostic of hypoglycemia.  101 - 125 mg/dl Impaired fasting glucose/consistent with

## 2025-06-10 NOTE — PROGRESS NOTES
Patient verified name and .  Order for consent NOT found in EHR and matches case posting; patient verifies procedure.   Type 1B surgery, Phone assessment complete. No orders received.  Labs per surgeon: None  Labs per anesthesia protocol: No additional    Patient answered medical/surgical history questions at their best of ability. All prior to admission medications documented in EPIC.    Patient instructed to continue taking all prescription medications up to the day of surgery but to take only the following medications the day of surgery according to anesthesia guidelines with water:   baclofen (Lioresal) if needed     Nothing to eat after midnight, including gum and mints. Per anesthesia guidelines, you may continue non-caffeinated clear liquids (like Gatorade, water, or tea) up to 2 hours prior to arrival    Also, patient is requested to take 2 Tylenol in the morning and then again before bed on the day before surgery. Regular or extra strength may be used.       Patient informed that all vitamins and supplements should be held 7 days prior to surgery and NSAIDS 5 days prior to surgery. Prescription meds to hold:  None    Patient instructed on the following:    > Arrive at [facility] Main Entrance, time of arrival to be called the day before by 1700  > Nothing to eat after midnight, including gum and mints.  > Responsible adult must drive patient to the hospital, stay during surgery, and patient will need supervision 24 hours after anesthesia  > Use non moisturizing soap in shower the night before surgery and on the morning of surgery  > All piercings must be removed prior to arrival.    > Leave all valuables (money and jewelry) at home but bring insurance card and ID on DOS.   > Do not wear make-up, nail polish, lotions, cologne, perfumes, powders, or oil on skin. Artificial nails are not permitted.

## 2025-06-11 ENCOUNTER — CLINICAL DOCUMENTATION (OUTPATIENT)
Dept: CASE MANAGEMENT | Age: 60
End: 2025-06-11

## 2025-06-11 DIAGNOSIS — C67.9 UROTHELIAL CARCINOMA OF BLADDER (HCC): ICD-10-CM

## 2025-06-11 DIAGNOSIS — Z59.86 FINANCIAL INSECURITY DUE TO MEDICAL EXPENSES: Primary | ICD-10-CM

## 2025-06-11 SDOH — ECONOMIC STABILITY: INCOME INSECURITY: IN THE LAST 12 MONTHS, WAS THERE A TIME WHEN YOU WERE NOT ABLE TO PAY THE MORTGAGE OR RENT ON TIME?: NO

## 2025-06-11 SDOH — ECONOMIC STABILITY: TRANSPORTATION INSECURITY
IN THE PAST 12 MONTHS, HAS THE LACK OF TRANSPORTATION KEPT YOU FROM MEDICAL APPOINTMENTS OR FROM GETTING MEDICATIONS?: NO

## 2025-06-11 SDOH — ECONOMIC STABILITY: FOOD INSECURITY: WITHIN THE PAST 12 MONTHS, YOU WORRIED THAT YOUR FOOD WOULD RUN OUT BEFORE YOU GOT MONEY TO BUY MORE.: NEVER TRUE

## 2025-06-11 SDOH — ECONOMIC STABILITY: FOOD INSECURITY: WITHIN THE PAST 12 MONTHS, THE FOOD YOU BOUGHT JUST DIDN'T LAST AND YOU DIDN'T HAVE MONEY TO GET MORE.: NEVER TRUE

## 2025-06-11 SDOH — ECONOMIC STABILITY: TRANSPORTATION INSECURITY
IN THE PAST 12 MONTHS, HAS LACK OF TRANSPORTATION KEPT YOU FROM MEETINGS, WORK, OR FROM GETTING THINGS NEEDED FOR DAILY LIVING?: NO

## 2025-06-11 SDOH — ECONOMIC STABILITY - INCOME SECURITY: FINANCIAL INSECURITY: Z59.86

## 2025-06-11 ASSESSMENT — SOCIAL DETERMINANTS OF HEALTH (SDOH)

## 2025-06-11 NOTE — PROGRESS NOTES
Nurse Navigation outreach related to care coordination    Other Called to speak with pt to complete SDOH assessment. Pt reported that she is \"a little nervous\" about her upcoming TURBT. Her sister from [place] will come to stay with her during her recovery. Had questions about how long her naik will be in post-op. Message sent to uro onc team for clarification. Pt is currently in the process of applying for disability.    Navigation Assessment:  The following were identified as potential barriers to care:   1. Financial toxicity related to medical expenses.     Interventions and Plan:  Pt lives alone but states that she has friends she sees nearly daily. She is concerned about medical bills adding up, and would also potentially benefit from counseling or social support. Referral placed to social work.    Referrals placed: Social Work [[id number]]  Goal of next outreach: Pre-op education support  Time Spent: 45 minutes

## 2025-06-12 ENCOUNTER — CLINICAL DOCUMENTATION (OUTPATIENT)
Dept: ONCOLOGY | Age: 60
End: 2025-06-12

## 2025-06-13 ENCOUNTER — CLINICAL DOCUMENTATION (OUTPATIENT)
Dept: CASE MANAGEMENT | Age: 60
End: 2025-06-13

## 2025-06-13 NOTE — PROGRESS NOTES
Nurse Navigation outreach related to care coordination    Other Called pt to inform her that her naik is anticipated to remain for 1-2 days after her procedure and she will be instructed prior to discharge home. Left VM with contact information for return call if further clarification is needed.    Navigation Assessment:  The following were identified as potential barriers to care:   1. Financial toxicity related to medical expenses.     Interventions and Plan:  Referral to [facility] placed on 6/11/25 and pt will be seen by SW at next med onc visit. Navigation will follow up with pt post-op.    Goal of next outreach: Post-op status check  Time Spent: 3 minutes

## 2025-06-17 ENCOUNTER — HOSPITAL ENCOUNTER (OUTPATIENT)
Age: 60
Setting detail: OBSERVATION
Discharge: HOME | End: 2025-06-18
Attending: UROLOGY | Admitting: UROLOGY
Payer: MEDICAID

## 2025-06-17 ENCOUNTER — ANESTHESIA EVENT (OUTPATIENT)
Dept: SURGERY | Age: 60
End: 2025-06-17
Payer: MEDICAID

## 2025-06-17 ENCOUNTER — ANESTHESIA (OUTPATIENT)
Dept: SURGERY | Age: 60
End: 2025-06-17
Payer: MEDICAID

## 2025-06-17 DIAGNOSIS — Z86.03 H/O TRANSURETHRAL RESECTION OF BLADDER TUMOR (TURBT): Primary | ICD-10-CM

## 2025-06-17 DIAGNOSIS — Z98.890 H/O TRANSURETHRAL RESECTION OF BLADDER TUMOR (TURBT): Primary | ICD-10-CM

## 2025-06-17 PROBLEM — D09.0 BLADDER CA IN SITU: Status: ACTIVE | Noted: 2025-06-17

## 2025-06-17 PROCEDURE — 7100000001 HC PACU RECOVERY - ADDTL 15 MIN: Performed by: UROLOGY

## 2025-06-17 PROCEDURE — 6360000002 HC RX W HCPCS: Performed by: NURSE ANESTHETIST, CERTIFIED REGISTERED

## 2025-06-17 PROCEDURE — 2709999900 HC NON-CHARGEABLE SUPPLY: Performed by: UROLOGY

## 2025-06-17 PROCEDURE — 2580000003 HC RX 258: Performed by: UROLOGY

## 2025-06-17 PROCEDURE — 6360000002 HC RX W HCPCS: Performed by: PHYSICIAN ASSISTANT

## 2025-06-17 PROCEDURE — 3700000001 HC ADD 15 MINUTES (ANESTHESIA): Performed by: UROLOGY

## 2025-06-17 PROCEDURE — 88307 TISSUE EXAM BY PATHOLOGIST: CPT

## 2025-06-17 PROCEDURE — 88344 IMHCHEM/IMCYTCHM EA MLT ANTB: CPT

## 2025-06-17 PROCEDURE — 6360000002 HC RX W HCPCS: Performed by: ANESTHESIOLOGY

## 2025-06-17 PROCEDURE — G0378 HOSPITAL OBSERVATION PER HR: HCPCS

## 2025-06-17 PROCEDURE — 2720000010 HC SURG SUPPLY STERILE: Performed by: UROLOGY

## 2025-06-17 PROCEDURE — 2500000003 HC RX 250 WO HCPCS: Performed by: UROLOGY

## 2025-06-17 PROCEDURE — 2500000003 HC RX 250 WO HCPCS: Performed by: NURSE ANESTHETIST, CERTIFIED REGISTERED

## 2025-06-17 PROCEDURE — 7100000000 HC PACU RECOVERY - FIRST 15 MIN: Performed by: UROLOGY

## 2025-06-17 PROCEDURE — 2700000000 HC OXYGEN THERAPY PER DAY

## 2025-06-17 PROCEDURE — 2580000003 HC RX 258: Performed by: ANESTHESIOLOGY

## 2025-06-17 PROCEDURE — 3600000004 HC SURGERY LEVEL 4 BASE: Performed by: UROLOGY

## 2025-06-17 PROCEDURE — 3700000000 HC ANESTHESIA ATTENDED CARE: Performed by: UROLOGY

## 2025-06-17 PROCEDURE — 3600000014 HC SURGERY LEVEL 4 ADDTL 15MIN: Performed by: UROLOGY

## 2025-06-17 PROCEDURE — 6370000000 HC RX 637 (ALT 250 FOR IP): Performed by: UROLOGY

## 2025-06-17 PROCEDURE — 94760 N-INVAS EAR/PLS OXIMETRY 1: CPT

## 2025-06-17 RX ORDER — OXYCODONE HYDROCHLORIDE 5 MG/1
5 TABLET ORAL EVERY 4 HOURS PRN
Status: DISCONTINUED | OUTPATIENT
Start: 2025-06-17 | End: 2025-06-18 | Stop reason: HOSPADM

## 2025-06-17 RX ORDER — SODIUM CHLORIDE, SODIUM LACTATE, POTASSIUM CHLORIDE, CALCIUM CHLORIDE 600; 310; 30; 20 MG/100ML; MG/100ML; MG/100ML; MG/100ML
INJECTION, SOLUTION INTRAVENOUS CONTINUOUS
Status: DISCONTINUED | OUTPATIENT
Start: 2025-06-17 | End: 2025-06-17

## 2025-06-17 RX ORDER — ROCURONIUM BROMIDE 10 MG/ML
INJECTION, SOLUTION INTRAVENOUS
Status: DISCONTINUED | OUTPATIENT
Start: 2025-06-17 | End: 2025-06-17 | Stop reason: SDUPTHER

## 2025-06-17 RX ORDER — LIDOCAINE HYDROCHLORIDE 20 MG/ML
INJECTION, SOLUTION EPIDURAL; INFILTRATION; INTRACAUDAL; PERINEURAL
Status: DISCONTINUED | OUTPATIENT
Start: 2025-06-17 | End: 2025-06-17 | Stop reason: SDUPTHER

## 2025-06-17 RX ORDER — PHENYLEPHRINE HYDROCHLORIDE 10 MG/ML
INJECTION INTRAVENOUS
Status: DISCONTINUED | OUTPATIENT
Start: 2025-06-17 | End: 2025-06-17 | Stop reason: SDUPTHER

## 2025-06-17 RX ORDER — SODIUM CHLORIDE 0.9 % (FLUSH) 0.9 %
5-40 SYRINGE (ML) INJECTION EVERY 12 HOURS SCHEDULED
Status: DISCONTINUED | OUTPATIENT
Start: 2025-06-17 | End: 2025-06-18 | Stop reason: HOSPADM

## 2025-06-17 RX ORDER — NALOXONE HYDROCHLORIDE 0.4 MG/ML
INJECTION, SOLUTION INTRAMUSCULAR; INTRAVENOUS; SUBCUTANEOUS PRN
Status: DISCONTINUED | OUTPATIENT
Start: 2025-06-17 | End: 2025-06-17 | Stop reason: SDUPTHER

## 2025-06-17 RX ORDER — SENNA AND DOCUSATE SODIUM 50; 8.6 MG/1; MG/1
1 TABLET, FILM COATED ORAL 2 TIMES DAILY
Status: DISCONTINUED | OUTPATIENT
Start: 2025-06-17 | End: 2025-06-18 | Stop reason: HOSPADM

## 2025-06-17 RX ORDER — SODIUM CHLORIDE, SODIUM LACTATE, POTASSIUM CHLORIDE, CALCIUM CHLORIDE 600; 310; 30; 20 MG/100ML; MG/100ML; MG/100ML; MG/100ML
INJECTION, SOLUTION INTRAVENOUS CONTINUOUS
Status: DISCONTINUED | OUTPATIENT
Start: 2025-06-17 | End: 2025-06-17 | Stop reason: HOSPADM

## 2025-06-17 RX ORDER — NALOXONE HYDROCHLORIDE 0.4 MG/ML
INJECTION, SOLUTION INTRAMUSCULAR; INTRAVENOUS; SUBCUTANEOUS PRN
Status: DISCONTINUED | OUTPATIENT
Start: 2025-06-17 | End: 2025-06-17

## 2025-06-17 RX ORDER — OXYCODONE HYDROCHLORIDE 5 MG/1
5 TABLET ORAL
Status: DISCONTINUED | OUTPATIENT
Start: 2025-06-17 | End: 2025-06-17

## 2025-06-17 RX ORDER — OXYCODONE HYDROCHLORIDE 5 MG/1
5 TABLET ORAL
Status: DISCONTINUED | OUTPATIENT
Start: 2025-06-17 | End: 2025-06-17 | Stop reason: SDUPTHER

## 2025-06-17 RX ORDER — ONDANSETRON 2 MG/ML
INJECTION INTRAMUSCULAR; INTRAVENOUS
Status: DISCONTINUED | OUTPATIENT
Start: 2025-06-17 | End: 2025-06-17 | Stop reason: SDUPTHER

## 2025-06-17 RX ORDER — ONDANSETRON 2 MG/ML
4 INJECTION INTRAMUSCULAR; INTRAVENOUS EVERY 6 HOURS PRN
Status: DISCONTINUED | OUTPATIENT
Start: 2025-06-17 | End: 2025-06-18 | Stop reason: HOSPADM

## 2025-06-17 RX ORDER — ONDANSETRON 2 MG/ML
4 INJECTION INTRAMUSCULAR; INTRAVENOUS
Status: DISCONTINUED | OUTPATIENT
Start: 2025-06-17 | End: 2025-06-17 | Stop reason: SDUPTHER

## 2025-06-17 RX ORDER — FENTANYL CITRATE 50 UG/ML
INJECTION, SOLUTION INTRAMUSCULAR; INTRAVENOUS
Status: DISCONTINUED | OUTPATIENT
Start: 2025-06-17 | End: 2025-06-17 | Stop reason: SDUPTHER

## 2025-06-17 RX ORDER — SODIUM CHLORIDE, SODIUM LACTATE, POTASSIUM CHLORIDE, CALCIUM CHLORIDE 600; 310; 30; 20 MG/100ML; MG/100ML; MG/100ML; MG/100ML
INJECTION, SOLUTION INTRAVENOUS CONTINUOUS
Status: DISCONTINUED | OUTPATIENT
Start: 2025-06-17 | End: 2025-06-17 | Stop reason: SDUPTHER

## 2025-06-17 RX ORDER — LIDOCAINE HYDROCHLORIDE 10 MG/ML
1 INJECTION, SOLUTION INFILTRATION; PERINEURAL
Status: DISCONTINUED | OUTPATIENT
Start: 2025-06-17 | End: 2025-06-17 | Stop reason: HOSPADM

## 2025-06-17 RX ORDER — SODIUM CHLORIDE, SODIUM LACTATE, POTASSIUM CHLORIDE, CALCIUM CHLORIDE 600; 310; 30; 20 MG/100ML; MG/100ML; MG/100ML; MG/100ML
INJECTION, SOLUTION INTRAVENOUS CONTINUOUS
Status: DISCONTINUED | OUTPATIENT
Start: 2025-06-17 | End: 2025-06-18 | Stop reason: HOSPADM

## 2025-06-17 RX ORDER — SODIUM CHLORIDE 0.9 % (FLUSH) 0.9 %
5-40 SYRINGE (ML) INJECTION PRN
Status: DISCONTINUED | OUTPATIENT
Start: 2025-06-17 | End: 2025-06-18 | Stop reason: HOSPADM

## 2025-06-17 RX ORDER — KETAMINE HCL IN NACL, ISO-OSM 20 MG/2 ML
SYRINGE (ML) INJECTION
Status: DISCONTINUED | OUTPATIENT
Start: 2025-06-17 | End: 2025-06-17 | Stop reason: SDUPTHER

## 2025-06-17 RX ORDER — OXYBUTYNIN CHLORIDE 10 MG/1
10 TABLET, EXTENDED RELEASE ORAL NIGHTLY
Status: DISCONTINUED | OUTPATIENT
Start: 2025-06-17 | End: 2025-06-18 | Stop reason: HOSPADM

## 2025-06-17 RX ORDER — POLYETHYLENE GLYCOL 3350 17 G
4 POWDER IN PACKET (EA) ORAL PRN
Status: DISCONTINUED | OUTPATIENT
Start: 2025-06-17 | End: 2025-06-18 | Stop reason: HOSPADM

## 2025-06-17 RX ORDER — DEXAMETHASONE SODIUM PHOSPHATE 4 MG/ML
INJECTION, SOLUTION INTRA-ARTICULAR; INTRALESIONAL; INTRAMUSCULAR; INTRAVENOUS; SOFT TISSUE
Status: DISCONTINUED | OUTPATIENT
Start: 2025-06-17 | End: 2025-06-17 | Stop reason: SDUPTHER

## 2025-06-17 RX ORDER — PROPOFOL 10 MG/ML
INJECTION, EMULSION INTRAVENOUS
Status: DISCONTINUED | OUTPATIENT
Start: 2025-06-17 | End: 2025-06-17 | Stop reason: SDUPTHER

## 2025-06-17 RX ORDER — ESMOLOL HYDROCHLORIDE 10 MG/ML
INJECTION INTRAVENOUS
Status: DISCONTINUED | OUTPATIENT
Start: 2025-06-17 | End: 2025-06-17 | Stop reason: SDUPTHER

## 2025-06-17 RX ORDER — ONDANSETRON 2 MG/ML
4 INJECTION INTRAMUSCULAR; INTRAVENOUS
Status: DISCONTINUED | OUTPATIENT
Start: 2025-06-17 | End: 2025-06-17

## 2025-06-17 RX ADMIN — OXYBUTYNIN CHLORIDE 10 MG: 10 TABLET, EXTENDED RELEASE ORAL at 21:09

## 2025-06-17 RX ADMIN — FENTANYL CITRATE 50 MCG: 50 INJECTION, SOLUTION INTRAMUSCULAR; INTRAVENOUS at 10:43

## 2025-06-17 RX ADMIN — DEXAMETHASONE SODIUM PHOSPHATE 4 MG: 4 INJECTION INTRA-ARTICULAR; INTRALESIONAL; INTRAMUSCULAR; INTRAVENOUS; SOFT TISSUE at 10:50

## 2025-06-17 RX ADMIN — DOCUSATE SODIUM 50 MG AND SENNOSIDES 8.6 MG 1 TABLET: 8.6; 5 TABLET, FILM COATED ORAL at 21:09

## 2025-06-17 RX ADMIN — SODIUM CHLORIDE, PRESERVATIVE FREE 10 ML: 5 INJECTION INTRAVENOUS at 21:10

## 2025-06-17 RX ADMIN — PROPOFOL 200 MG: 10 INJECTION, EMULSION INTRAVENOUS at 10:43

## 2025-06-17 RX ADMIN — ROCURONIUM BROMIDE 10 MG: 10 INJECTION, SOLUTION INTRAVENOUS at 11:08

## 2025-06-17 RX ADMIN — HYDROMORPHONE HYDROCHLORIDE 0.5 MG: 1 INJECTION, SOLUTION INTRAMUSCULAR; INTRAVENOUS; SUBCUTANEOUS at 12:09

## 2025-06-17 RX ADMIN — HYDROMORPHONE HYDROCHLORIDE 0.5 MG: 1 INJECTION, SOLUTION INTRAMUSCULAR; INTRAVENOUS; SUBCUTANEOUS at 12:02

## 2025-06-17 RX ADMIN — PHENYLEPHRINE HYDROCHLORIDE 100 MCG: 10 INJECTION INTRAVENOUS at 10:54

## 2025-06-17 RX ADMIN — SODIUM CHLORIDE, POTASSIUM CHLORIDE, SODIUM LACTATE AND CALCIUM CHLORIDE: 600; 310; 30; 20 INJECTION, SOLUTION INTRAVENOUS at 15:24

## 2025-06-17 RX ADMIN — SODIUM CHLORIDE, POTASSIUM CHLORIDE, SODIUM LACTATE AND CALCIUM CHLORIDE: 600; 310; 30; 20 INJECTION, SOLUTION INTRAVENOUS at 09:40

## 2025-06-17 RX ADMIN — ROCURONIUM BROMIDE 10 MG: 10 INJECTION, SOLUTION INTRAVENOUS at 11:24

## 2025-06-17 RX ADMIN — FENTANYL CITRATE 25 MCG: 50 INJECTION, SOLUTION INTRAMUSCULAR; INTRAVENOUS at 11:52

## 2025-06-17 RX ADMIN — FENTANYL CITRATE 50 MCG: 50 INJECTION, SOLUTION INTRAMUSCULAR; INTRAVENOUS at 10:58

## 2025-06-17 RX ADMIN — ONDANSETRON 4 MG: 2 INJECTION, SOLUTION INTRAMUSCULAR; INTRAVENOUS at 10:50

## 2025-06-17 RX ADMIN — LIDOCAINE HYDROCHLORIDE 100 MG: 20 INJECTION, SOLUTION EPIDURAL; INFILTRATION; INTRACAUDAL; PERINEURAL at 10:43

## 2025-06-17 RX ADMIN — FENTANYL CITRATE 25 MCG: 50 INJECTION, SOLUTION INTRAMUSCULAR; INTRAVENOUS at 11:56

## 2025-06-17 RX ADMIN — Medication 20 MG: at 11:24

## 2025-06-17 RX ADMIN — DOCUSATE SODIUM 50 MG AND SENNOSIDES 8.6 MG 1 TABLET: 8.6; 5 TABLET, FILM COATED ORAL at 15:19

## 2025-06-17 RX ADMIN — FENTANYL CITRATE 50 MCG: 50 INJECTION, SOLUTION INTRAMUSCULAR; INTRAVENOUS at 11:36

## 2025-06-17 RX ADMIN — HYDROMORPHONE HYDROCHLORIDE 0.5 MG: 1 INJECTION, SOLUTION INTRAMUSCULAR; INTRAVENOUS; SUBCUTANEOUS at 12:22

## 2025-06-17 RX ADMIN — PHENYLEPHRINE HYDROCHLORIDE 100 MCG: 10 INJECTION INTRAVENOUS at 10:57

## 2025-06-17 RX ADMIN — Medication 2000 MG: at 10:50

## 2025-06-17 RX ADMIN — ROCURONIUM BROMIDE 40 MG: 10 INJECTION, SOLUTION INTRAVENOUS at 10:43

## 2025-06-17 RX ADMIN — ESMOLOL HYDROCHLORIDE 10 MG: 10 INJECTION, SOLUTION INTRAVENOUS at 11:36

## 2025-06-17 RX ADMIN — HYDROMORPHONE HYDROCHLORIDE 0.5 MG: 1 INJECTION, SOLUTION INTRAMUSCULAR; INTRAVENOUS; SUBCUTANEOUS at 12:43

## 2025-06-17 RX ADMIN — SUGAMMADEX 200 MG: 100 INJECTION, SOLUTION INTRAVENOUS at 11:51

## 2025-06-17 ASSESSMENT — PAIN DESCRIPTION - LOCATION
LOCATION: ABDOMEN

## 2025-06-17 ASSESSMENT — PAIN - FUNCTIONAL ASSESSMENT
PAIN_FUNCTIONAL_ASSESSMENT: 0-10

## 2025-06-17 ASSESSMENT — LIFESTYLE VARIABLES: SMOKING_STATUS: 1

## 2025-06-17 ASSESSMENT — PAIN SCALES - GENERAL
PAINLEVEL_OUTOF10: 7
PAINLEVEL_OUTOF10: 7

## 2025-06-17 ASSESSMENT — PAIN DESCRIPTION - DESCRIPTORS
DESCRIPTORS: ACHING
DESCRIPTORS: ACHING

## 2025-06-17 ASSESSMENT — PAIN DESCRIPTION - ORIENTATION
ORIENTATION: LOWER

## 2025-06-17 NOTE — PROGRESS NOTES
SPIRITUAL HEALTH  Note for Med/Surg Visit                  Room # MOR/PL    Name: Aurora Wilhelm           Age: 59 y.o.    Gender: female          MRN: [id number]  Yazidism: Congregation       Preferred Language: English    Date: 06/17/25  Visit Time: Begin Time: (P) 0920 End Time : (P) 0930 Complexity of Encounter: (P) Low      Visit Summary:  prayed with patient in anticipation of their procedure as per previous request. Patient expressed gratitude.  offered further support at patient's request.      Referral/Consult From: (P) Rounding  Encounter Overview/Reason: (P) Spiritual/Emotional Needs, Pre-Procedural  Encounter Code:     Crisis (if applicable):    Service Provided For: (P) Patient and family together     Patient was available.    Kindra, Belief, Meaning:   Patient identifies as spiritual  Family/Friends identifies as spiritual  Rituals (if applicable)      Importance and Influence:  Patient does not have spiritual/personal beliefs that influence decisions regarding their health  Family/Friends does not have spiritual/personal beliefs that influence decisions regarding the patient's health    Community:  Patient   Support System Includes   (P) Family members   Family/Friends   Support System Includes   (P) Family members     Assessment and Plan of Care:   Emotions Expressed by Patient:   Assessment: (P) Calm    Interventions by :   Intervention: (P) Active listening, Prayer (assurance of)/[place], Sustaining Presence/Ministry of presence     Result/ Response by Patient:   Outcome: (P) Acceptance, Comfort    Patient Plan of Care:   Plan and Referrals  Plan/Referrals: (P) Continue Support (comment)     Emotions Expressed by Spouse/Family/Friends:   calm     Interventions with Spouse/ Family/Friends include:   active listening  prayer  provided ministry of presence    Spouse/Family/Friends Plan of Care:   Spiritual care available upon referral.      Electronically signed by

## 2025-06-17 NOTE — PROGRESS NOTES
TRANSFER - IN REPORT:    Verbal report received from Mayra on Aurora Wilhelm  being received from PACU for routine progression of patient care      Report consisted of patient's Situation, Background, Assessment and   Recommendations(SBAR).     Information from the following report(s) Nurse Handoff Report was reviewed with the receiving nurse.    Opportunity for questions and clarification was provided.      Assessment completed upon patient's arrival to unit and care assumed.

## 2025-06-17 NOTE — ANESTHESIA PRE PROCEDURE
Department of Anesthesiology  Preprocedure Note       Name:  Aurora Wilhelm   Age:  59 y.o.  :  1965                                          MRN:  [id number]         Date:  2025      Surgeon: Surgeon(s):  Andrea Chaney MD    Procedure: Procedure(s):  CYSTOSCOPY TRANSURETHRAL RESECTION BLADDER    Medications prior to admission:   Prior to Admission medications    Medication Sig Start Date End Date Taking? Authorizing Provider   nicotine polacrilex (COMMIT) 4 MG lozenge Take 1 lozenge by mouth as needed for Smoking cessation   Yes ProviderPedro MD   amitriptyline (ELAVIL) 25 MG tablet Take 1 tablet by mouth nightly 8/3/23  Yes Friend, Rachel KILLIAN MD   baclofen (LIORESAL) 10 MG tablet Take 1 tablet by mouth 3 times daily Takes 4-5 times per day 3/31/23  Yes ProviderPedro MD   Multiple Vitamins-Minerals (CENTRUM SILVER) CHEW Take by mouth daily   Yes ProviderPedro MD   Menthol, Topical Analgesic, (BIOFREEZE ROLL-ON EX) Apply topically as needed Back and neck   Yes ProviderPedro MD   NONFORMULARY as needed CBD and delta gummies   Yes Provider, MD Pedro   acetaminophen (TYLENOL) 500 MG tablet Take 1 tablet by mouth every 6 hours as needed for Pain   Yes ProviderPedro MD       Current medications:    Current Facility-Administered Medications   Medication Dose Route Frequency Provider Last Rate Last Admin    lidocaine 1 % injection 1 mL  1 mL IntraDERmal Once PRN Andrea Montalvo Jr., MD        lactated ringers infusion   IntraVENous Continuous Andrea Montalvo Jr.,  mL/hr at 25 0940 New Bag at 25 0940    ceFAZolin (ANCEF) 2000 mg in sterile water 20 mL IV syringe  2,000 mg IntraVENous On Call to OR Angy Borjas PA           Allergies:  No Known Allergies    Problem List:    Patient Active Problem List   Diagnosis Code    Fracture, Colles, right, closed S52.531A    Bladder mass N32.89    Hydroureteronephrosis N13.30    Abnormal

## 2025-06-17 NOTE — PERIOP NOTE
1:04 PM    TRANSFER - OUT REPORT:    Verbal report given to MG Buckley on Aurora Wilhelm  being transferred to [facility] for routine post-op       Report consisted of patient’s Situation, Background, Assessment and   Recommendations(SBAR).     Information from the following report(s) Nurse Handoff Report, Intake/Output, MAR, Cardiac Rhythm NSR, and Neuro Assessment was reviewed with the receiving nurse.    Lines:   Peripheral IV 06/17/25 Distal;Left;Posterior Forearm (Active)   Site Assessment Clean, dry & intact 06/17/25 1330   Line Status Flushed;Infusing 06/17/25 1330   Phlebitis Assessment No symptoms 06/17/25 1330   Infiltration Assessment 0 06/17/25 1330   Dressing Status Clean, dry & intact 06/17/25 1330   Dressing Type Transparent 06/17/25 1330        Opportunity for questions and clarification was provided.      Patient transported with:   O2 @ 3 liters    VTE prophylaxis orders have been written for Aurora Wilhelm.    Family in waiting room given room number.

## 2025-06-17 NOTE — ANESTHESIA POSTPROCEDURE EVALUATION
Department of Anesthesiology  Postprocedure Note    Patient: Aurora Wilhelm  MRN: [id number]  YOB: 1965  Date of evaluation: 6/17/2025    Procedure Summary       Date: 06/17/25 Room / Location: [facility] MAIN OR  / [facility] MAIN OR    Anesthesia Start: 1035 Anesthesia Stop: 1200    Procedure: CYSTOSCOPY TRANSURETHRAL RESECTION BLADDER (Bladder) Diagnosis:       Malignant neoplasm of urinary bladder, unspecified site (HCC)      (Malignant neoplasm of urinary bladder, unspecified site (HCC) [C67.9])    Providers: Andrea Chaney MD Responsible Provider: Andrea Montalvo Jr., MD    Anesthesia Type: general ASA Status: 3            Anesthesia Type: No value filed.    Nini Phase I: Nini Score: 9    Nini Phase II:      Anesthesia Post Evaluation    Patient location during evaluation: PACU  Patient participation: complete - patient participated  Level of consciousness: awake  Pain score: 0  Airway patency: patent  Nausea & Vomiting: no nausea and no vomiting  Cardiovascular status: blood pressure returned to baseline and hemodynamically stable  Respiratory status: acceptable, spontaneous ventilation and nonlabored ventilation  Hydration status: euvolemic  Multimodal analgesia pain management approach  Pain management: adequate    No notable events documented.

## 2025-06-17 NOTE — ANESTHESIA PROCEDURE NOTES
Airway  Date/Time: 6/17/2025 10:45 AM  Urgency: elective    Airway not difficult    General Information and Staff    Patient location during procedure: OR  Resident/CRNA: Sheron Richmond APRN - CRNA  Performed: resident/CRNA   Performed by: Sheron Richmond APRN - CRNA  Authorized by: Andrea Montalvo Jr., MD      Indications and Patient Condition  Indications for airway management: anesthesia  Spontaneous Ventilation: absent  Sedation level: deep  Preoxygenated: yes  Patient position: sniffing  MILS not maintained throughout  Mask difficulty assessment: vent by bag mask + OA or adjuvant +/- NMBA    Final Airway Details  Final airway type: endotracheal airway      Successful airway: ETT  Cuffed: yes   Successful intubation technique: direct laryngoscopy  Facilitating devices/methods: intubating stylet  Endotracheal tube insertion site: oral  Blade: Khloe  Blade size: #3  ETT size (mm): 7.0  Cormack-Lehane Classification: grade I - full view of glottis  Placement verified by: chest auscultation and capnometry   Measured from: lips  Number of attempts at approach: 1  Ventilation between attempts: bag mask  Number of other approaches attempted: 0    no

## 2025-06-17 NOTE — OP NOTE
OPERATIVE REPORT    Aurora Wilhelm    [id number]     06/17/25        PREOPERATIVE DIAGNOSIS: Bladder cancer.     POSTOPERATIVE DIAGNOSIS: Bladder cancer.     PROCEDURES:  1. Cystoscopy  2. Transurethral resection of bladder tumor (large)       SURGEON:  Andrea Chaney     ASSISTANT:  None     ANESTHESIA: General anesthesia with ET.     ANTIBIOTICS:  Ancef      LINES, DRAINS, AND TUBES: 20 Fr naik cathter with 10ml in balloon     COMPLICATIONS:  None.     SPECIMENS:      ID Type Source Tests Collected by Time Destination   A : bladder tumor trigone and right sidewall Tissue Bladder SURGICAL PATHOLOGY Andrea Chaney MD 6/17/2025 1123    B : bladder tumor base right trigone Tissue Bladder SURGICAL PATHOLOGY Andrea Chaney MD 6/17/2025 1130         ESTIMATED BLOOD LOSS:  <50 cc.     INDICATIONS:  Patient has a bladder lesion. Patient presents to the OR today for TURBT.      FINDINGS: Patient had an impressive amount of high-grade muscle invasive appearing tumor.  There was a large amount of papillary tumor starting at the bladder neck and extending back across the mid trigone all the way to the posterior bladder.  It then extended all the way across the floor of the bladder on the right side and up the majority of the right sidewall.  At least two thirds of the bladder inner surface was involved.  I was never able to see the right or left ureteral orifice.  The tumor was extremely vascular.  Grossly there was evidence of muscle invasion.  I resected an area that was appropriate approximately 8 to 9 cm across.  The resection was not complete.  There was no evidence of perforation.  On bimanual exam the bladder was mobile.  The mass was still palpable involving the right floor of the bladder.  I did not palpate any vaginal involvement.        NUMBER OF TUMORS: 10+  SIZE OF LARGEST TUMOR: 8 cm  TUMOR CHARACTERISTICS: See above  RECURRENT VERSUS PRIMARY TUMOR: Primary (second

## 2025-06-17 NOTE — PLAN OF CARE
Problem: Pain  Goal: Verbalizes/displays adequate comfort level or baseline comfort level  6/17/2025 1926 by Ina Brantley RN  Outcome: Progressing  6/17/2025 1925 by Ina Brantley RN  Outcome: Progressing  Flowsheets (Taken 6/17/2025 1925)  Verbalizes/displays adequate comfort level or baseline comfort level: Assess pain using appropriate pain scale     Problem: Discharge Planning  Goal: Discharge to home or other facility with appropriate resources  6/17/2025 1926 by Ina Brantley RN  Outcome: Progressing  6/17/2025 1925 by Ina Brantley RN  Outcome: Progressing  Flowsheets (Taken 6/17/2025 1925)  Discharge to home or other facility with appropriate resources: Identify discharge learning needs (meds, wound care, etc)     Problem: Musculoskeletal - Adult  Goal: Return mobility to safest level of function  6/17/2025 1926 by Ina Brantley RN  Outcome: Progressing  6/17/2025 1925 by Ina Brantley RN  Outcome: Progressing  Goal: Return ADL status to a safe level of function  6/17/2025 1926 by Ina Brantley RN  Outcome: Progressing  6/17/2025 1925 by Ina Brantley RN  Outcome: Progressing

## 2025-06-18 VITALS
HEART RATE: 71 BPM | OXYGEN SATURATION: 95 % | BODY MASS INDEX: 32.29 KG/M2 | WEIGHT: 218 LBS | SYSTOLIC BLOOD PRESSURE: 124 MMHG | RESPIRATION RATE: 16 BRPM | TEMPERATURE: 98.8 F | DIASTOLIC BLOOD PRESSURE: 67 MMHG | HEIGHT: 69 IN

## 2025-06-18 LAB
ANION GAP SERPL CALC-SCNC: 10 MMOL/L (ref 7–16)
BUN SERPL-MCNC: 11 MG/DL (ref 6–23)
CALCIUM SERPL-MCNC: 8.9 MG/DL (ref 8.8–10.2)
CHLORIDE SERPL-SCNC: 107 MMOL/L (ref 98–107)
CO2 SERPL-SCNC: 22 MMOL/L (ref 20–29)
CREAT SERPL-MCNC: 0.73 MG/DL (ref 0.6–1.1)
ERYTHROCYTE [DISTWIDTH] IN BLOOD BY AUTOMATED COUNT: 13.5 % (ref 11.9–14.6)
GLUCOSE SERPL-MCNC: 107 MG/DL (ref 70–99)
HCT VFR BLD AUTO: 37 % (ref 35.8–46.3)
HGB BLD-MCNC: 12.7 G/DL (ref 11.7–15.4)
MCH RBC QN AUTO: 33 PG (ref 26.1–32.9)
MCHC RBC AUTO-ENTMCNC: 34.3 G/DL (ref 31.4–35)
MCV RBC AUTO: 96.1 FL (ref 82–102)
NRBC # BLD: 0 K/UL (ref 0–0.2)
PLATELET # BLD AUTO: 196 K/UL (ref 150–450)
PMV BLD AUTO: 11.2 FL (ref 9.4–12.3)
POTASSIUM SERPL-SCNC: 4.2 MMOL/L (ref 3.5–5.1)
RBC # BLD AUTO: 3.85 M/UL (ref 4.05–5.2)
SODIUM SERPL-SCNC: 139 MMOL/L (ref 136–145)
WBC # BLD AUTO: 12.6 K/UL (ref 4.3–11.1)

## 2025-06-18 PROCEDURE — 85027 COMPLETE CBC AUTOMATED: CPT

## 2025-06-18 PROCEDURE — G0378 HOSPITAL OBSERVATION PER HR: HCPCS

## 2025-06-18 PROCEDURE — 6370000000 HC RX 637 (ALT 250 FOR IP): Performed by: UROLOGY

## 2025-06-18 PROCEDURE — 36415 COLL VENOUS BLD VENIPUNCTURE: CPT

## 2025-06-18 PROCEDURE — 80048 BASIC METABOLIC PNL TOTAL CA: CPT

## 2025-06-18 PROCEDURE — 99221 1ST HOSP IP/OBS SF/LOW 40: CPT

## 2025-06-18 RX ORDER — OXYBUTYNIN CHLORIDE 5 MG/1
5 TABLET, EXTENDED RELEASE ORAL DAILY
Qty: 30 TABLET | Refills: 3 | Status: SHIPPED | OUTPATIENT
Start: 2025-06-18

## 2025-06-18 RX ORDER — DOCUSATE SODIUM 100 MG/1
100 CAPSULE, LIQUID FILLED ORAL DAILY PRN
Qty: 30 CAPSULE | Refills: 0 | Status: SHIPPED | OUTPATIENT
Start: 2025-06-18

## 2025-06-18 RX ORDER — HYDROCODONE BITARTRATE AND ACETAMINOPHEN 5; 325 MG/1; MG/1
1 TABLET ORAL EVERY 4 HOURS PRN
Qty: 18 TABLET | Refills: 0 | Status: SHIPPED | OUTPATIENT
Start: 2025-06-18 | End: 2025-06-21

## 2025-06-18 RX ADMIN — DOCUSATE SODIUM 50 MG AND SENNOSIDES 8.6 MG 1 TABLET: 8.6; 5 TABLET, FILM COATED ORAL at 08:05

## 2025-06-18 NOTE — PROGRESS NOTES
Frank removed per order for VT. Hat placed in toilet. Pt educated on pushing oral hydration and ambulating.

## 2025-06-18 NOTE — CARE COORDINATION
CM met with Mr. Wilhelm and her sister Carmen in room 607 to discuss discharge planning.  She is observation status POD #1 TURBT for bladder cancer.      Prior to this surgery, she was independent with ADLs.  At discharge, her plan is home, no additional discharge needs voiced or identified. CM available as needed.      06/18/25 0400   Service Assessment   Patient Orientation Alert and Oriented   Cognition Alert   History Provided By Patient   Primary Caregiver Self   Accompanied By/Relationship her sister Carmen   Support Systems Family Members;Friends/Neighbors   PCP Verified by CM Yes   Prior Functional Level Independent in ADLs/IADLs   Current Functional Level Independent in ADLs/IADLs;Other (see comment)  (routine post-op activity limitations)   Can patient return to prior living arrangement Yes   Ability to make needs known: Good   Family able to assist with home care needs: Yes   Would you like for me to discuss the discharge plan with any other family members/significant others, and if so, who? No   Condition of Participation: Discharge Planning   The Plan for Transition of Care is related to the following treatment goals: home when stable

## 2025-06-18 NOTE — PROGRESS NOTES
Pt resting in bed at present time without complaints. Pt ambulated a few time around the hallway during this shift. Frank draining and patent with bloody and clear output. Hourly rounds completed, all needs met this shift. Bed locked and low, call light within reach. Will give report to oncoming day shift nurse.

## 2025-06-18 NOTE — DISCHARGE SUMMARY
Discharge Summary    Date: 6/18/2025  Patient Name: Aurora Wilhelm    YOB: 1965     Age: 59 y.o.    Admit Date: 6/17/2025  Discharge Date:  Discharge Condition: Stable    Admission Diagnosis  Malignant neoplasm of urinary bladder, unspecified site (HCC) [C67.9];Bladder CA in situ [D09.0]      Discharge Diagnosis  Principal Problem:    Malignant neoplasm of urinary bladder (HCC)  Active Problems:    Bladder CA in situ  Resolved Problems:    * No resolved hospital problems. *      Hospital Stay  Narrative of Hospital Course:  The patient underwent transurethral resection of bladder.  Procedure was without complications. Following the procedure, patient was transferred to PACU and admitted to the urology floor for convalescence. Postoperatively, the patient progressed as expected. CBI was weaned as urine cleared. On the day of discharge, naik was removed, and patient passed voiding trial. Keep scheduled follow up appointment.    Consultants:  None    Surgeries/procedures Performed:  Cystoscopy  Transurethral resection of bladder tumor (large)          Treatments:    IV Hydration, Surgery and Antibiotics        Discharge Plan/Disposition:  Home    Hospital/Incidental Findings Requiring Follow Up:    Patient Instructions:    Diet:    Activity:No Heavy Lifting  For number of days (if applicable):      Other Instructions:    Provider Follow-Up:   No follow-ups on file.     Significant Diagnostic Studies:    Recent Labs:  Admission on 06/17/2025  Sodium                                        Date: 06/18/2025  Value: 139         Ref range: 136 - 145 mmol/L   Status: Final  Potassium                                     Date: 06/18/2025  Value: 4.2         Ref range: 3.5 - 5.1 mmol/L   Status: Final  Chloride                                      Date: 06/18/2025  Value: 107         Ref range: 98 - 107 mmol/L    Status: Final  CO2                                           Date: 06/18/2025  Value: 22      Discharge Medication List    CONTINUE these medications which have NOT CHANGED    nicotine polacrilex (COMMIT) 4 MG lozenge  Take 1 lozenge by mouth as needed for Smoking cessation    amitriptyline (ELAVIL) 25 MG tablet  Take 1 tablet by mouth nightly  Qty: 30 tablet Refills: 0  Associated Diagnoses:Closed Colles' fracture of right radius, initial encounter; Dislocation of right elbow, initial encounter    baclofen (LIORESAL) 10 MG tablet  Take 1 tablet by mouth 3 times daily Takes 4-5 times per day    Multiple Vitamins-Minerals (CENTRUM SILVER) CHEW  Take by mouth daily    Menthol, Topical Analgesic, (BIOFREEZE ROLL-ON EX)  Apply topically as needed Back and neck    NONFORMULARY  as needed CBD and delta gummies    acetaminophen (TYLENOL) 500 MG tablet  Take 1 tablet by mouth every 6 hours as needed for Pain          Current Discharge Medication List        Time Spent on Discharge:  minutes were spent in patient examination, evaluation, counseling as well as medication reconciliation, prescriptions for required medications, discharge plan, and follow up.    Electronically signed by SOFIA HAHN CNP on 6/18/25 at 1:13 PM EDT

## 2025-06-18 NOTE — PROGRESS NOTES
Admit Date: 6/17/2025    Subjective:     Patient report intermittent abd cramping.    Objective:     Patient Vitals for the past 8 hrs:   BP Temp Temp src Pulse Resp SpO2   06/18/25 0800 124/67 98.8 °F (37.1 °C) Oral 71 16 95 %   06/18/25 0530 110/68 98.4 °F (36.9 °C) Oral 70 16 92 %     06/18 0701 - 06/18 1900  In: 769 [I.V.:769]  Out: -   06/16 1901 - 06/18 0700  In: 975 [I.V.:975]  Out: 2380 [Urine:2375]    Physical Exam:  GENERAL ASSESSMENT: alert, oriented to person, place and time, no acute distress and no anxiety, depression or agitation  Chest: Easy work of breathing  CVS exam: RRR  ABDOMEN: not done  Neurological exam reveals alert, oriented, normal speech, no focal findings or movement disorder noted.  FEMALE GENITOURINARY EXAM: naik draining pink UOP  MALE GENITAL EXAM: not done          Data Review   Recent Results (from the past 24 hours)   Basic Metabolic Panel    Collection Time: 06/18/25  5:57 AM   Result Value Ref Range    Sodium 139 136 - 145 mmol/L    Potassium 4.2 3.5 - 5.1 mmol/L    Chloride 107 98 - 107 mmol/L    CO2 22 20 - 29 mmol/L    Anion Gap 10 7 - 16 mmol/L    Glucose 107 (H) 70 - 99 mg/dL    BUN 11 6 - 23 MG/DL    Creatinine 0.73 0.60 - 1.10 MG/DL    Est, Glom Filt Rate >90 >60 ml/min/1.73m2    Calcium 8.9 8.8 - 10.2 MG/DL   CBC    Collection Time: 06/18/25  5:57 AM   Result Value Ref Range    WBC 12.6 (H) 4.3 - 11.1 K/uL    RBC 3.85 (L) 4.05 - 5.2 M/uL    Hemoglobin 12.7 11.7 - 15.4 g/dL    Hematocrit 37.0 35.8 - 46.3 %    MCV 96.1 82 - 102 FL    MCH 33.0 (H) 26.1 - 32.9 PG    MCHC 34.3 31.4 - 35.0 g/dL    RDW 13.5 11.9 - 14.6 %    Platelets 196 150 - 450 K/uL    MPV 11.2 9.4 - 12.3 FL    nRBC 0.00 0.0 - 0.2 K/uL       No results found.      Assessment:     Principal Problem:    Malignant neoplasm of urinary bladder (HCC)  Active Problems:    Bladder CA in situ  Resolved Problems:    * No resolved hospital problems. *    S/P  Cystoscopy.  Transurethral resection of bladder tumor  (large) POD1.  Cr 0.73, hgb 12.7, WBC 12.6.  AF, VSS.  Plan:   Remove naik for voiding trial.  Ambulate.  IS.   Push PO hydration.  Home today after VT.  Will arrange office follow up.       SOFIA HAHN - CNP  [facility]    Phone: [phone]

## 2025-06-19 ENCOUNTER — CLINICAL DOCUMENTATION (OUTPATIENT)
Age: 60
End: 2025-06-19

## 2025-06-20 ENCOUNTER — CLINICAL DOCUMENTATION (OUTPATIENT)
Dept: CASE MANAGEMENT | Age: 60
End: 2025-06-20

## 2025-06-20 NOTE — PROGRESS NOTES
Nurse Navigation outreach related to hospital follow-up    Other Called to check in with pt after hospital discharge. Left VM with contact information requesting a call back.    Goal of next outreach: Check plan of care  Time Spent: 2 minutes

## 2025-06-25 NOTE — PROGRESS NOTES
point the patient wishes to move forward with radical cystectomy, pelvic lymph node dissection, and urinary diversion.       PLAN:   -Pathology reviewed  -Robotic radical cystectomy with ileal conduit recommended, risks vs benefits discussed  -Neoadjuvant chemotherapy per med oncology   -Labs and imaging deferred to Dr Harris's team   -RTC in 3-4 months   ________________________________________      I have seen and examined this patient.  I have reviewed and edited the note started by the MA and agree with the outlined plan.  Part of this note was written by using a voice dictation software. The note has been proof read but may still contain some grammatical/other typographical errors.      Angy Borjas PA-C  Urologic Oncology  [facility]

## 2025-06-30 ENCOUNTER — OFFICE VISIT (OUTPATIENT)
Dept: ONCOLOGY | Age: 60
End: 2025-06-30

## 2025-06-30 ENCOUNTER — OFFICE VISIT (OUTPATIENT)
Age: 60
End: 2025-06-30

## 2025-06-30 VITALS
TEMPERATURE: 98.3 F | OXYGEN SATURATION: 99 % | BODY MASS INDEX: 32.44 KG/M2 | HEART RATE: 70 BPM | DIASTOLIC BLOOD PRESSURE: 78 MMHG | WEIGHT: 219 LBS | RESPIRATION RATE: 22 BRPM | HEIGHT: 69 IN | SYSTOLIC BLOOD PRESSURE: 139 MMHG

## 2025-06-30 VITALS
BODY MASS INDEX: 32.29 KG/M2 | SYSTOLIC BLOOD PRESSURE: 141 MMHG | HEIGHT: 69 IN | OXYGEN SATURATION: 97 % | RESPIRATION RATE: 16 BRPM | DIASTOLIC BLOOD PRESSURE: 82 MMHG | WEIGHT: 218 LBS | HEART RATE: 63 BPM

## 2025-06-30 DIAGNOSIS — C67.9 UROTHELIAL CARCINOMA OF BLADDER (HCC): Primary | ICD-10-CM

## 2025-06-30 DIAGNOSIS — C67.9 MALIGNANT NEOPLASM OF URINARY BLADDER, UNSPECIFIED SITE (HCC): Primary | ICD-10-CM

## 2025-06-30 PROCEDURE — 99215 OFFICE O/P EST HI 40 MIN: CPT | Performed by: INTERNAL MEDICINE

## 2025-06-30 PROCEDURE — 99215 OFFICE O/P EST HI 40 MIN: CPT | Performed by: PHYSICIAN ASSISTANT

## 2025-06-30 ASSESSMENT — PATIENT HEALTH QUESTIONNAIRE - PHQ9
SUM OF ALL RESPONSES TO PHQ QUESTIONS 1-9: 0
SUM OF ALL RESPONSES TO PHQ QUESTIONS 1-9: 0
1. LITTLE INTEREST OR PLEASURE IN DOING THINGS: NOT AT ALL
2. FEELING DOWN, DEPRESSED OR HOPELESS: NOT AT ALL
SUM OF ALL RESPONSES TO PHQ QUESTIONS 1-9: 0
1. LITTLE INTEREST OR PLEASURE IN DOING THINGS: NOT AT ALL
SUM OF ALL RESPONSES TO PHQ QUESTIONS 1-9: 0
2. FEELING DOWN, DEPRESSED OR HOPELESS: NOT AT ALL

## 2025-06-30 NOTE — PATIENT INSTRUCTIONS
Patient Information from Today's Visit    The members of your Oncology Medical Home are listed below:    Physician Provider: Dr. Jeferson Harris  Advanced Practice Clinician: Madelin Tan  Registered Nurse: Abundio GARCES   Nurse Navigator: Renate MURPHY RN  Medical Assistant: Sara \"Winsome\" JACQUES   : Gisela \"Loreta\" JOSE.   Supportive Care Services: [facility] [place]    Diagnosis (Information Sheet Provided on Day of Diagnosis): Bladder Cancer    Follow Up Instructions:   For chemo education, port placement, and to start treatment.    Has Treatment Plan Been Finalized? Yes -   Agent Information: Chemotherapy/Biotherapy      Diagnosis: Bladder Cancer    Goal of Therapy: __ Palliative      _X_Curative         Name of medication(s): cisplatin, gemcitabine, durvalumab    Number of Cycles Planned: 4                  Length of Cycle:  21 days      Chemotherapy plan is subject to change at your provider's discretion.    A copy of this plan has been discussed and given to Patient by RN.    Education Needed: Yes, schedule within 2 weeks  Education Materials Given (eating hints, chemotherapy and you, chemotherapy education and self-care guidelines): Yes      Drug Materials provided: Yes    ESYM Education Provided: Yes  ESYM enrollment status: Patient declines        Date Given:6/30/25  Patient opts to receive education materials via [employer]: Yes    Consent for therapy:   Completed on C1D1 treatment.    Current Labs:   No visits with results within 3 Day(s) from this visit.   Latest known visit with results is:   Admission on 06/17/2025, Discharged on 06/18/2025   Component Date Value Ref Range Status    Sodium 06/18/2025 139  136 - 145 mmol/L Final    Potassium 06/18/2025 4.2  3.5 - 5.1 mmol/L Final    Chloride 06/18/2025 107  98 - 107 mmol/L Final    CO2 06/18/2025 22  20 - 29 mmol/L Final    Anion Gap 06/18/2025 10  7 - 16 mmol/L Final    Glucose 06/18/2025 107 (H)  70 - 99 mg/dL Final    Comment: <70 mg/dL Consistent with, but

## 2025-06-30 NOTE — PROGRESS NOTES
[facility]: Office Visit Established Patient    Chief Complaint:    Chief Complaint   Patient presents with    Follow-up         History of Present Illness:  The patient is a 59 y.o. female who presents for follow-up of bladder cancer. Initially, she presented to primary care in March 2025 following an urgent care visit in February 2025, where she was diagnosed with hematuria. At that time, she was empirically treated for a urinary tract infection. A computed tomography (CT) scan of the abdomen and pelvis performed on 03/26/2025 revealed an irregularly contoured mass in the bladder measuring up to 7.4 cm, accompanied by irregular wall thickening, moderate to large right hydronephrosis, and right renal atrophy. Additionally, a 7 mm hepatic focus was noted, deemed too small for further evaluation. She was subsequently referred to Dr. Keith, whom she consulted on 04/03/2025. A cystoscopy performed during this consultation revealed a large bladder tumor. On 05/14/2025, she underwent transurethral resection of the bladder tumor (TURBT), which confirmed the presence of high-grade papillary urothelial carcinoma with an inverted growth pattern and nests of tumor extending between smooth muscle bundles in one area. The pathology report indicated a differential diagnosis that included the possibility of muscularis propria invasion. She was referred to medical oncology for further management recommendations.  A PET scan will be conducted to rule out any metastasis beyond the bladder. The case will be presented at the Multidisciplinary Conference (MDC) for further evaluation and consensus on the treatment plan. My presumption is that a repeat cystoscopy and possible transurethral resection (TUR) will be scheduled after consultation with Dr. Chaney to check for any residual tumor and potential muscle invasion. If no muscle invasion is found, aggressive treatments like chemotherapy or radical

## 2025-06-30 NOTE — PATIENT INSTRUCTIONS
Patient Information from Today's Visit    The members of your Oncology Medical Home are listed below:    Physician Provider: Andrea Chaney Urologic Oncologist  Advanced Practice Clinician: CYRIL Cabezas  Medical Assistant: Sybil GRIFFIN CMA  :Gisela MURPHY  Supportive Care Services: Caren NGUYEN LMSW    Diagnosis (Information Sheet Provided on Day of Diagnosis): Urothelial     Follow Up Instructions:   Pathology reviewed   Plan to proceed with treatment with medical  oncology  We will see you back in 3-4 months. If you need anything prior please do not hesitate to call our office.  Has Treatment Plan Been Finalized? No    Current Labs:   No visits with results within 3 Day(s) from this visit.   Latest known visit with results is:   Admission on 06/17/2025, Discharged on 06/18/2025   Component Date Value Ref Range Status    Sodium 06/18/2025 139  136 - 145 mmol/L Final    Potassium 06/18/2025 4.2  3.5 - 5.1 mmol/L Final    Chloride 06/18/2025 107  98 - 107 mmol/L Final    CO2 06/18/2025 22  20 - 29 mmol/L Final    Anion Gap 06/18/2025 10  7 - 16 mmol/L Final    Glucose 06/18/2025 107 (H)  70 - 99 mg/dL Final    Comment: <70 mg/dL Consistent with, but not fully diagnostic of hypoglycemia.  100 - 125 mg/dL Impaired fasting glucose/consistent with pre-diabetes mellitus.  > 126 mg/dl Fasting glucose consistent with overt diabetes mellitus      BUN 06/18/2025 11  6 - 23 MG/DL Final    Creatinine 06/18/2025 0.73  0.60 - 1.10 MG/DL Final    Est, Glom Filt Rate 06/18/2025 >90  >60 ml/min/1.73m2 Final    Comment:    Pediatric calculator link: https://www.kidney.org/professionals/kdoqi/gfr_calculatorped     These results are not intended for use in patients <18 years of age.     eGFR results are calculated without a race factor using  the 2021 CKD-EPI equation. Careful clinical correlation is recommended, particularly when comparing to results calculated using previous equations.  The CKD-EPI equation is less

## 2025-06-30 NOTE — ONCOLOGY
START OFF PATHWAY REGIMEN - Bladder        [id number]:      Durvalumab (Imfinzi)       Gemcitabine       Cisplatin     **Always confirm dose/schedule in your pharmacy ordering system**    Patient Characteristics:  Pre-Cystectomy or Nonsurgical Candidate, M0 (Clinical Staging), cT2-4a, cN0-1, M0, Cystectomy Eligible, Cisplatin-Based Chemotherapy Indicated with CrCl ? 60 mL/min and Minimal or No Symptoms  Therapeutic Status: Pre-Cystectomy or Nonsurgical Candidate, M0 (Clinical Staging)  AJCC M Category: cM0  AJCC 8 Stage Grouping: II  AJCC T Category: cT2  AJCC N Category: cN0

## 2025-07-02 ENCOUNTER — CLINICAL DOCUMENTATION (OUTPATIENT)
Facility: HOSPITAL | Age: 60
End: 2025-07-02

## 2025-07-02 NOTE — PROGRESS NOTES
Patient Assistance    Spoke with: Aurora Wilhelm    Navigator Type: Infusion  Documentation Type: New Patient  Contact Type: Telephone  Status of Patient Insurance Coverage: Patient does not have active coverage          Additional notes: Patient gave verbal permission to be enrolled in the [place] patient assistance program. I will meet with her on Tuesday 7/8 to go over any other questions and to help with the financial assistance application.    Drug Name: Imfinzi  Form of PAP Assistance: Free Drug (Pending)

## 2025-07-03 ENCOUNTER — TELEPHONE (OUTPATIENT)
Dept: INTERVENTIONAL RADIOLOGY/VASCULAR | Age: 60
End: 2025-07-03

## 2025-07-03 ENCOUNTER — TELEPHONE (OUTPATIENT)
Dept: ONCOLOGY | Age: 60
End: 2025-07-03

## 2025-07-03 NOTE — TELEPHONE ENCOUNTER
Physician provider: Dr. Chaney  Reason for today's call (Please detail here patients chief complaint): Question from pt's sister    Last office visit:na  Patient Callback Number: [phone]  Was callback number verified?: Yes  Preferred pharmacy (If refill request): na  Veriified that patient confirmed no refills left at pharmacy? :No  Has the patient called the office for this concern within the last 48 hours?:No    Red Word Mentioned  Warm Transfer Phone Call to (Name): na    Patient notified that their information will be routed to the appropriate team for review. Patient is advised that they will receive a phone call from the appropriate department. If awaiting a call from the triage department and symptoms worsen before receiving a call back, the patient has been advised to proceed to the nearest ED.    Pt's sister says from her understanding the cancer is blocking the tubes from the kidney to the bladder; she is trying to find out if there's any attention that needs to be given to that blockage before chemotherapy starts on 7/15

## 2025-07-03 NOTE — TELEPHONE ENCOUNTER
Returned call to sister.  Informed that patient has chemo ed scheduled to complete chemo teaching on 7/10/25.  Verbalized understanding.

## 2025-07-07 ENCOUNTER — CLINICAL DOCUMENTATION (OUTPATIENT)
Facility: HOSPITAL | Age: 60
End: 2025-07-07

## 2025-07-07 NOTE — PROGRESS NOTES
Returned call to patient's sister, Carmen Askew, regarding appointment Tuesday 7/8. I left a vm stating I will be meeting with her sister to help with the financial assistance application.

## 2025-07-08 ENCOUNTER — CLINICAL DOCUMENTATION (OUTPATIENT)
Facility: HOSPITAL | Age: 60
End: 2025-07-08

## 2025-07-08 NOTE — PROGRESS NOTES
Spoke to patient regarding today's appointment. [facility] and she will meet with Tere Gil our financial counselor who will assist with the financial assistance application.

## 2025-07-09 ENCOUNTER — TELEPHONE (OUTPATIENT)
Dept: ONCOLOGY | Age: 60
End: 2025-07-09

## 2025-07-10 ENCOUNTER — OFFICE VISIT (OUTPATIENT)
Dept: ONCOLOGY | Age: 60
End: 2025-07-10

## 2025-07-10 DIAGNOSIS — T45.1X5A CHEMOTHERAPY INDUCED NAUSEA AND VOMITING: Primary | ICD-10-CM

## 2025-07-10 DIAGNOSIS — R11.2 CHEMOTHERAPY INDUCED NAUSEA AND VOMITING: Primary | ICD-10-CM

## 2025-07-10 DIAGNOSIS — C67.9 MALIGNANT NEOPLASM OF URINARY BLADDER, UNSPECIFIED SITE (HCC): ICD-10-CM

## 2025-07-10 PROCEDURE — 99214 OFFICE O/P EST MOD 30 MIN: CPT | Performed by: NURSE PRACTITIONER

## 2025-07-10 RX ORDER — PROCHLORPERAZINE MALEATE 10 MG
10 TABLET ORAL EVERY 6 HOURS PRN
Qty: 60 TABLET | Refills: 1 | Status: SHIPPED | OUTPATIENT
Start: 2025-07-10

## 2025-07-10 RX ORDER — LIDOCAINE AND PRILOCAINE 25; 25 MG/G; MG/G
CREAM TOPICAL
Qty: 1 EACH | Refills: 1 | Status: SHIPPED | OUTPATIENT
Start: 2025-07-10

## 2025-07-10 RX ORDER — ONDANSETRON 8 MG/1
8 TABLET, FILM COATED ORAL EVERY 8 HOURS PRN
Qty: 90 TABLET | Refills: 1 | Status: SHIPPED | OUTPATIENT
Start: 2025-07-10

## 2025-07-10 NOTE — PROGRESS NOTES
IV Chemotherapy/Biotherapy Education:  Aurora Wilhelm  is a 59 y.o. year old female with bladder cancer who presents for Chemotherapy/Biotherapy education for the following medication(s): Cisplatin, Gemcitabine, Durvalumab.  Schedule and frequency of the therapy plan were discussed with the patient and D1, D8 of 21 day cycle.    The patient was given handouts published by the [employer] titled \"Chemotherapy and You\" and \"Eating Hints Before, During, and After Cancer Treatment\" for reference.  Medication specific information was printed from BC Cancer Drug Index and distributed to the patient.    Self-care guidelines were distributed and discussed with the patient and included the followin) Potential long term and short term side effects of therapy including fertility risks for appropriate patients    2) Symptoms and side effects that require the patient to contact [facility] or require immediate attention    3) Symptoms or events that require immediate discontinuation of oral or self-administered treatments    4) Procedures for handling medications at home, including storage, safe handling, and management of unused medications    5) Procedures for handling bodily fluids and waste in the home    6) [facility]'s contact information with availability and instructions on who and when to call    7) [facility] missed appointment policy and expectations for rescheduling or canceling    Patient denies any needs or referrals at this time.  Prescriptions for Zofran, Compazine, and EMLA have been sent electronically to the local pharmacy.  Proper use and frequency of these medications were reviewed with patient and patient verbalized understanding of the treatment recommendations.    Patient asked appropriate questions and was involved and engaged during the educational session.  There were no barriers to learning that were

## 2025-07-11 ENCOUNTER — HOSPITAL ENCOUNTER (OUTPATIENT)
Dept: INTERVENTIONAL RADIOLOGY/VASCULAR | Age: 60
Discharge: HOME | End: 2025-07-13
Attending: INTERNAL MEDICINE

## 2025-07-11 VITALS
RESPIRATION RATE: 20 BRPM | SYSTOLIC BLOOD PRESSURE: 103 MMHG | HEART RATE: 71 BPM | OXYGEN SATURATION: 92 % | DIASTOLIC BLOOD PRESSURE: 59 MMHG | TEMPERATURE: 97 F

## 2025-07-11 DIAGNOSIS — C67.8 MALIGNANT NEOPLASM OF OVERLAPPING SITES OF BLADDER (HCC): Primary | ICD-10-CM

## 2025-07-11 DIAGNOSIS — C67.9 MALIGNANT NEOPLASM OF URINARY BLADDER, UNSPECIFIED SITE (HCC): ICD-10-CM

## 2025-07-11 PROCEDURE — 6360000002 HC RX W HCPCS: Performed by: PHYSICIAN ASSISTANT

## 2025-07-11 PROCEDURE — C1788 PORT, INDWELLING, IMP: HCPCS

## 2025-07-11 PROCEDURE — 6360000002 HC RX W HCPCS: Performed by: RADIOLOGY

## 2025-07-11 RX ORDER — FENTANYL CITRATE 50 UG/ML
INJECTION, SOLUTION INTRAMUSCULAR; INTRAVENOUS PRN
Status: COMPLETED | OUTPATIENT
Start: 2025-07-11 | End: 2025-07-11

## 2025-07-11 RX ORDER — MIDAZOLAM HYDROCHLORIDE 1 MG/ML
INJECTION, SOLUTION INTRAMUSCULAR; INTRAVENOUS PRN
Status: COMPLETED | OUTPATIENT
Start: 2025-07-11 | End: 2025-07-11

## 2025-07-11 RX ORDER — LIDOCAINE HYDROCHLORIDE AND EPINEPHRINE 10; 10 MG/ML; UG/ML
INJECTION, SOLUTION INFILTRATION; PERINEURAL PRN
Status: COMPLETED | OUTPATIENT
Start: 2025-07-11 | End: 2025-07-11

## 2025-07-11 RX ORDER — HEPARIN 100 UNIT/ML
SYRINGE INTRAVENOUS PRN
Status: COMPLETED | OUTPATIENT
Start: 2025-07-11 | End: 2025-07-11

## 2025-07-11 RX ADMIN — MIDAZOLAM 1 MG: 1 INJECTION INTRAMUSCULAR; INTRAVENOUS at 08:20

## 2025-07-11 RX ADMIN — FENTANYL CITRATE 50 MCG: 50 INJECTION, SOLUTION INTRAMUSCULAR; INTRAVENOUS at 08:20

## 2025-07-11 RX ADMIN — MIDAZOLAM 1 MG: 1 INJECTION INTRAMUSCULAR; INTRAVENOUS at 08:30

## 2025-07-11 RX ADMIN — HEPARIN 500 UNITS: 100 SYRINGE at 08:36

## 2025-07-11 RX ADMIN — MIDAZOLAM 1 MG: 1 INJECTION INTRAMUSCULAR; INTRAVENOUS at 08:25

## 2025-07-11 RX ADMIN — FENTANYL CITRATE 50 MCG: 50 INJECTION, SOLUTION INTRAMUSCULAR; INTRAVENOUS at 08:25

## 2025-07-11 RX ADMIN — LIDOCAINE HYDROCHLORIDE,EPINEPHRINE BITARTRATE 20 ML: 10; .01 INJECTION, SOLUTION INFILTRATION; PERINEURAL at 08:28

## 2025-07-11 RX ADMIN — FENTANYL CITRATE 50 MCG: 50 INJECTION, SOLUTION INTRAMUSCULAR; INTRAVENOUS at 08:30

## 2025-07-11 ASSESSMENT — PAIN SCALES - GENERAL: PAINLEVEL_OUTOF10: 5

## 2025-07-11 ASSESSMENT — PAIN DESCRIPTION - LOCATION: LOCATION: BACK

## 2025-07-11 NOTE — BRIEF OP NOTE
[facility]  Department of Interventional Radiology  [phone]        Interventional Radiology Brief Procedure Note    Patient: Aurora Wilhelm MRN: [id number]  SSN: [id number]    YOB: 1965  Age: 59 y.o.  Sex: female      Date of Procedure: 7/11/2025    Pre-Procedure Diagnosis: bladder cancer    Post-Procedure Diagnosis: SAME    Procedure(s): Venous Chest Port Placement    Brief Description of Procedure: see above    Performed By: Nga Jones PA-C     Assistants: None    Anesthesia:Moderate Sedation per PITO Valenzuela MD    Estimated Blood Loss: Less than 10ml    Specimens:  none    Implants:  Subcutaneous Port    Findings: catheter tip in right atrium     Complications: None    Recommendations: ok to use port     Follow Up: prn    Signed By: Nga Jones PA-C     July 11, 2025

## 2025-07-11 NOTE — H&P
[facility]  Department of Interventional Radiology  [phone]    History and Physical    Patient:  Aurora Wilhelm MRN:  [id number]  SSN:  [id number]    YOB: 1965  Age:  59 y.o.  Sex:  female      Primary Care Provider:  Renate Sahu APRN - CNP  Referring Physician:  Jeferson Harris MD    Subjective:     Chief Complaint: port    History of the Present Illness:  The patient is a 59 y.o. female with bladder cancer who presents for venous chest port placement.  NPO.        Past Medical History:   Diagnosis Date    Elbow dislocation     right    H/O transurethral resection of bladder tumor (TURBT)     Malignant neoplasm of bladder (HCC)     Spinal stenosis of lumbar region with neurogenic claudication 2022    epidural injections    UTI (urinary tract infection)     Wrist fracture     right     Past Surgical History:   Procedure Laterality Date    BLADDER SURGERY N/A 05/14/2025    CYSTOSCOPY TRANSURETHRAL RESECTION BLADDER performed by Roger Keith DO at [facility] MAIN OR    BLADDER SURGERY N/A 6/17/2025    CYSTOSCOPY TRANSURETHRAL RESECTION BLADDER performed by Andrea Chaney MD at [facility] MAIN OR    BUNIONECTOMY Left     CYST REMOVAL Right     right side of jaw    FOREARM SURGERY Right 06/23/2023    Open reduction, internal fixation right distal radius fracture performed by Rachel Moseley MD at [facility] OPC        Review of Systems:    Pertinent items are noted in HPI.    Prior to Admission medications    Medication Sig Start Date End Date Taking? Authorizing Provider   lidocaine-prilocaine (EMLA) 2.5-2.5 % cream Apply topically as needed to port site 30-60 minutes prior to being accessed with needle. Cover with saran wrap. 7/10/25   Sara Salguero APRN - NP   prochlorperazine (COMPAZINE) 10 MG tablet Take 1 tablet by mouth every 6 hours as needed (nausea/vomiting) 7/10/25   Sara Salguero APRN - NP   ondansetron (ZOFRAN) 8 MG tablet Take 1 tablet by   Comment: 3-4 wine every other day        Not in a hospital admission.    Objective:       Physical Examination:    Vitals:    07/11/25 0726   BP: 127/66   Pulse: 75   Resp: 18   Temp: 97 °F (36.1 °C)   TempSrc: Temporal   SpO2: 96%       Pain Assessment  Pain Level: 5  Pain Location: Back          Pain Level: 5       Pain Location: Back         Goal 0  Interv: per primary                      HEART: regular rate and rhythm  LUNG: clear to auscultation bilaterally  ABDOMEN: normal findings: soft, non-tender  EXTREMITIES: warm, no edema    Laboratory:     Lab Results   Component Value Date/Time     06/18/2025 05:57 AM     05/15/2025 03:42 AM    K 4.2 06/18/2025 05:57 AM    K 4.5 05/15/2025 03:42 AM     06/18/2025 05:57 AM     05/15/2025 03:42 AM    CO2 22 06/18/2025 05:57 AM    CO2 21 05/15/2025 03:42 AM    BUN 11 06/18/2025 05:57 AM    BUN 9 05/15/2025 03:42 AM    GLOB 2.8 03/14/2025 03:02 PM    ALT 16 03/14/2025 03:02 PM     Lab Results   Component Value Date/Time    WBC 12.6 06/18/2025 05:57 AM    WBC 7.4 05/07/2025 10:45 AM    HGB 12.7 06/18/2025 05:57 AM    HGB 12.3 05/15/2025 03:42 AM    HCT 37.0 06/18/2025 05:57 AM    HCT 38.4 05/15/2025 03:42 AM     06/18/2025 05:57 AM     05/07/2025 10:45 AM     No results found for: \"APTT\", \"INR\"    Assessment:     Bladder cancer        Plan:     Planned Procedure:  port placement    Risks, benefits, and alternatives reviewed with patient and she agrees to proceed with the procedure.      Signed By: Nga Jones PA-C     July 11, 2025

## 2025-07-11 NOTE — OR NURSING
Recovery period without difficulty. Pt alert and oriented and denies pain. Dressing is clean, dry, and intact. Reviewed discharge instructions with patient and niece at bedside, both verbalized understanding. Pt escorted to lobby discharge area via wheelchair. Vital signs and Nini score completed.

## 2025-07-11 NOTE — OR NURSING
TRANSFER - OUT REPORT:           Verbal report given to MG Contreras(name) on Aurora Wilhelm  being transferred to IR Recovery 1(unit) for  routine post-op            Report consisted of patient’s Situation, Background, Assessment and      Recommendations(SBAR).          Information from the following report(s) SBAR, Procedure Summary, and MAR was reviewed with the receiving nurse.       Opportunity for questions and clarification was provided.          Conscious Sedation:    150 Mcg of Fentanyl administered   3 Mg of Versed administered   50 Mg of Benadryl administered        Pt tolerated procedure well.      Peripheral Intravenous Line:   Peripheral IV 07/11/25 Right Hand (Active)       VITALS:  BP (!) 118/58   Pulse 75   Temp 97 °F (36.1 °C) (Temporal)   Resp 20   SpO2 93%

## 2025-07-11 NOTE — OP NOTE
Title: Chest port placement through the right internal jugular vein using  ultrasound and fluoroscopic guidance with maximal sterile barrier appropriately  documented and fluoroscopy time and images documented in report.    History: 59-year-old female with bladder cancer.    :  Nga Jones PA-C    Supervising Physician: Ifeanyi Valenzuela MD,  The physician attests to  supervising this procedure and agrees with the report as written.    Consent: Informed written and oral consent was obtained from the patient after  explanation of benefits and risks (including, but not limited to: infection,  vascular injury, lung injury, and thrombus formation) of procedure. The  patient's questions were answered to their satisfaction. They stated  understanding and requested that we proceed.    Procedure: This port was inserted with all elements of maximal sterile barrier  technique, cap and mask and sterile gown and sterile gloves and sterile full  body drape and hygiene and 2% chlorhexidine for cutaneous antisepsis and sterile  ultrasound gel and sterile ultrasound probe cover.    Following routine prep and drape of the right neck and chest, a local field  block with lidocaine was achieved.  Ultrasound evaluation of all potential  access sites was performed due to lack of a palpable vein. The vein was not  palpable due to overlying adipose tissue. Using real-time ultrasound guidance,  with appropriate image recording, the patent right internal jugular vein was  accessed. Using fluoroscopy, a peel-away sheath was placed over a wire.    A 1-inch incision was made on the right chest wall and a subcutaneous pocket  created. The catheter was tunneled subcutaneously and passed down the peel-away  sheath positioning the tip in the right atrium, confirmed fluoroscopically. The  catheter was cut to the appropriate length and connected to the Port which was  then placed in the pocket.  The Port-A-Cath was accessed,  aspirated easily, and  was filled with heparinized saline.    All incisions were closed with absorbable suture. Sterile surgical glue was  placed on the skin.    Complications: None.    Radiation dose:  Fluoroscopy time: 6 seconds.  Reference air kerma (mGy): 1  Kerma area product (cGy.cm2): 22.64    Permanent hard copy ultrasound and fluoroscopic images were obtained and stored  in PACS.    Contrast: 0 milliliters.    Medications: 23 minutes face-to-face time for moderate sedation was provided  under the direction and supervision of Ifeanyi Valenzuela MD using fentanyl and  Versed. Continuous cardiopulmonary monitoring was provided by trained  independent observer present.   Impression:     Uncomplicated right internal jugular vein tunneled power injectable  chest port placement.    Plan: The patient will recover for 1 hour. The chest port is ready for use.

## 2025-07-14 RX ORDER — SODIUM CHLORIDE 0.9 % (FLUSH) 0.9 %
5-40 SYRINGE (ML) INJECTION PRN
Status: DISCONTINUED | OUTPATIENT
Start: 2025-07-15 | End: 2025-07-16 | Stop reason: HOSPADM

## 2025-07-15 ENCOUNTER — HOSPITAL ENCOUNTER (OUTPATIENT)
Dept: INFUSION THERAPY | Age: 60
Setting detail: INFUSION SERIES
Discharge: HOME | End: 2025-07-15

## 2025-07-15 ENCOUNTER — OFFICE VISIT (OUTPATIENT)
Dept: ONCOLOGY | Age: 60
End: 2025-07-15

## 2025-07-15 ENCOUNTER — HOSPITAL ENCOUNTER (OUTPATIENT)
Dept: LAB | Age: 60
Discharge: HOME | End: 2025-07-15

## 2025-07-15 VITALS
TEMPERATURE: 98.1 F | SYSTOLIC BLOOD PRESSURE: 116 MMHG | HEART RATE: 80 BPM | RESPIRATION RATE: 22 BRPM | HEIGHT: 69 IN | BODY MASS INDEX: 32.58 KG/M2 | WEIGHT: 220 LBS | DIASTOLIC BLOOD PRESSURE: 66 MMHG | OXYGEN SATURATION: 96 %

## 2025-07-15 DIAGNOSIS — C67.8 MALIGNANT NEOPLASM OF OVERLAPPING SITES OF BLADDER (HCC): Primary | ICD-10-CM

## 2025-07-15 DIAGNOSIS — C67.8 MALIGNANT NEOPLASM OF OVERLAPPING SITES OF BLADDER (HCC): ICD-10-CM

## 2025-07-15 LAB
ALBUMIN SERPL-MCNC: 3.5 G/DL (ref 3.5–5)
ALBUMIN/GLOB SERPL: 1.2 (ref 1–1.9)
ALP SERPL-CCNC: 86 U/L (ref 35–104)
ALT SERPL-CCNC: 11 U/L (ref 8–45)
ANION GAP SERPL CALC-SCNC: 11 MMOL/L (ref 7–16)
AST SERPL-CCNC: 17 U/L (ref 15–37)
BASOPHILS # BLD: 0.05 K/UL (ref 0–0.2)
BASOPHILS NFR BLD: 0.5 % (ref 0–2)
BILIRUB SERPL-MCNC: 0.4 MG/DL (ref 0–1.2)
BUN SERPL-MCNC: 12 MG/DL (ref 6–23)
CALCIUM SERPL-MCNC: 8.8 MG/DL (ref 8.8–10.2)
CHLORIDE SERPL-SCNC: 105 MMOL/L (ref 98–107)
CO2 SERPL-SCNC: 23 MMOL/L (ref 20–29)
CREAT SERPL-MCNC: 0.84 MG/DL (ref 0.6–1.1)
DIFFERENTIAL METHOD BLD: ABNORMAL
EOSINOPHIL # BLD: 0.44 K/UL (ref 0–0.8)
EOSINOPHIL NFR BLD: 4.2 % (ref 0.5–7.8)
ERYTHROCYTE [DISTWIDTH] IN BLOOD BY AUTOMATED COUNT: 13.5 % (ref 11.9–14.6)
GLOBULIN SER CALC-MCNC: 2.9 G/DL (ref 2.3–3.5)
GLUCOSE SERPL-MCNC: 100 MG/DL (ref 70–99)
HBV SURFACE AB SERPL IA-ACNC: <3.5 MIU/ML
HBV SURFACE AG SER QL: NONREACTIVE
HCT VFR BLD AUTO: 39.8 % (ref 35.8–46.3)
HGB BLD-MCNC: 13 G/DL (ref 11.7–15.4)
IMM GRANULOCYTES # BLD AUTO: 0.03 K/UL (ref 0–0.5)
IMM GRANULOCYTES NFR BLD AUTO: 0.3 % (ref 0–5)
LYMPHOCYTES # BLD: 2.13 K/UL (ref 0.5–4.6)
LYMPHOCYTES NFR BLD: 20.2 % (ref 13–44)
MAGNESIUM SERPL-MCNC: 2.3 MG/DL (ref 1.8–2.4)
MCH RBC QN AUTO: 32.7 PG (ref 26.1–32.9)
MCHC RBC AUTO-ENTMCNC: 32.7 G/DL (ref 31.4–35)
MCV RBC AUTO: 100.3 FL (ref 82–102)
MONOCYTES # BLD: 0.79 K/UL (ref 0.1–1.3)
MONOCYTES NFR BLD: 7.5 % (ref 4–12)
NEUTS SEG # BLD: 7.11 K/UL (ref 1.7–8.2)
NEUTS SEG NFR BLD: 67.3 % (ref 43–78)
NRBC # BLD: 0 K/UL (ref 0–0.2)
PLATELET # BLD AUTO: 208 K/UL (ref 150–450)
PMV BLD AUTO: 11.4 FL (ref 9.4–12.3)
POTASSIUM SERPL-SCNC: 4.3 MMOL/L (ref 3.5–5.1)
PROT SERPL-MCNC: 6.4 G/DL (ref 6.3–8.2)
RBC # BLD AUTO: 3.97 M/UL (ref 4.05–5.2)
SODIUM SERPL-SCNC: 139 MMOL/L (ref 136–145)
TSH, 3RD GENERATION: 2.31 UIU/ML (ref 0.27–4.2)
WBC # BLD AUTO: 10.6 K/UL (ref 4.3–11.1)

## 2025-07-15 PROCEDURE — 96367 TX/PROPH/DG ADDL SEQ IV INF: CPT

## 2025-07-15 PROCEDURE — 87340 HEPATITIS B SURFACE AG IA: CPT

## 2025-07-15 PROCEDURE — 84443 ASSAY THYROID STIM HORMONE: CPT

## 2025-07-15 PROCEDURE — 96375 TX/PRO/DX INJ NEW DRUG ADDON: CPT

## 2025-07-15 PROCEDURE — 2500000003 HC RX 250 WO HCPCS: Performed by: INTERNAL MEDICINE

## 2025-07-15 PROCEDURE — 96415 CHEMO IV INFUSION ADDL HR: CPT

## 2025-07-15 PROCEDURE — 83735 ASSAY OF MAGNESIUM: CPT

## 2025-07-15 PROCEDURE — 96417 CHEMO IV INFUS EACH ADDL SEQ: CPT

## 2025-07-15 PROCEDURE — 96366 THER/PROPH/DIAG IV INF ADDON: CPT

## 2025-07-15 PROCEDURE — 96413 CHEMO IV INFUSION 1 HR: CPT

## 2025-07-15 PROCEDURE — 2580000003 HC RX 258: Performed by: INTERNAL MEDICINE

## 2025-07-15 PROCEDURE — 99214 OFFICE O/P EST MOD 30 MIN: CPT | Performed by: INTERNAL MEDICINE

## 2025-07-15 PROCEDURE — 86706 HEP B SURFACE ANTIBODY: CPT

## 2025-07-15 PROCEDURE — 6360000002 HC RX W HCPCS: Performed by: INTERNAL MEDICINE

## 2025-07-15 PROCEDURE — 86704 HEP B CORE ANTIBODY TOTAL: CPT

## 2025-07-15 PROCEDURE — 80053 COMPREHEN METABOLIC PANEL: CPT

## 2025-07-15 PROCEDURE — 85025 COMPLETE CBC W/AUTO DIFF WBC: CPT

## 2025-07-15 RX ORDER — ACETAMINOPHEN 325 MG/1
650 TABLET ORAL
Status: CANCELLED | OUTPATIENT
Start: 2025-07-15

## 2025-07-15 RX ORDER — ONDANSETRON 2 MG/ML
8 INJECTION INTRAMUSCULAR; INTRAVENOUS ONCE
Status: CANCELLED | OUTPATIENT
Start: 2025-07-15 | End: 2025-07-15

## 2025-07-15 RX ORDER — ALBUTEROL SULFATE 90 UG/1
4 INHALANT RESPIRATORY (INHALATION) PRN
OUTPATIENT
Start: 2025-07-22

## 2025-07-15 RX ORDER — DIPHENHYDRAMINE HYDROCHLORIDE 50 MG/ML
50 INJECTION, SOLUTION INTRAMUSCULAR; INTRAVENOUS
OUTPATIENT
Start: 2025-07-22

## 2025-07-15 RX ORDER — EPINEPHRINE 1 MG/ML
0.3 INJECTION, SOLUTION, CONCENTRATE INTRAVENOUS PRN
OUTPATIENT
Start: 2025-07-22

## 2025-07-15 RX ORDER — SODIUM CHLORIDE 0.9 % (FLUSH) 0.9 %
5-40 SYRINGE (ML) INJECTION PRN
OUTPATIENT
Start: 2025-07-22

## 2025-07-15 RX ORDER — SODIUM CHLORIDE 9 MG/ML
5-250 INJECTION, SOLUTION INTRAVENOUS PRN
OUTPATIENT
Start: 2025-07-22

## 2025-07-15 RX ORDER — ALBUTEROL SULFATE 90 UG/1
4 INHALANT RESPIRATORY (INHALATION) PRN
Status: DISCONTINUED | OUTPATIENT
Start: 2025-07-15 | End: 2025-07-16 | Stop reason: HOSPADM

## 2025-07-15 RX ORDER — SODIUM CHLORIDE 0.9 % (FLUSH) 0.9 %
5-40 SYRINGE (ML) INJECTION PRN
Status: DISCONTINUED | OUTPATIENT
Start: 2025-07-15 | End: 2025-07-16 | Stop reason: HOSPADM

## 2025-07-15 RX ORDER — EPINEPHRINE 1 MG/ML
0.3 INJECTION, SOLUTION, CONCENTRATE INTRAVENOUS PRN
Status: CANCELLED | OUTPATIENT
Start: 2025-07-15

## 2025-07-15 RX ORDER — ONDANSETRON 2 MG/ML
8 INJECTION INTRAMUSCULAR; INTRAVENOUS
OUTPATIENT
Start: 2025-07-15

## 2025-07-15 RX ORDER — ALBUTEROL SULFATE 90 UG/1
4 INHALANT RESPIRATORY (INHALATION) PRN
OUTPATIENT
Start: 2025-07-15

## 2025-07-15 RX ORDER — ONDANSETRON 2 MG/ML
8 INJECTION INTRAMUSCULAR; INTRAVENOUS ONCE
Status: COMPLETED | OUTPATIENT
Start: 2025-07-15 | End: 2025-07-15

## 2025-07-15 RX ORDER — ONDANSETRON 2 MG/ML
8 INJECTION INTRAMUSCULAR; INTRAVENOUS
Status: DISCONTINUED | OUTPATIENT
Start: 2025-07-15 | End: 2025-07-16 | Stop reason: HOSPADM

## 2025-07-15 RX ORDER — FAMOTIDINE 10 MG/ML
20 INJECTION, SOLUTION INTRAVENOUS
OUTPATIENT
Start: 2025-07-22

## 2025-07-15 RX ORDER — ONDANSETRON 2 MG/ML
8 INJECTION INTRAMUSCULAR; INTRAVENOUS
OUTPATIENT
Start: 2025-07-22

## 2025-07-15 RX ORDER — LORAZEPAM 2 MG/ML
0.5 INJECTION INTRAMUSCULAR
OUTPATIENT
Start: 2025-07-22

## 2025-07-15 RX ORDER — SODIUM CHLORIDE 9 MG/ML
5-250 INJECTION, SOLUTION INTRAVENOUS PRN
OUTPATIENT
Start: 2025-07-15

## 2025-07-15 RX ORDER — ALBUTEROL SULFATE 90 UG/1
4 INHALANT RESPIRATORY (INHALATION) PRN
Status: CANCELLED | OUTPATIENT
Start: 2025-07-15

## 2025-07-15 RX ORDER — ACETAMINOPHEN 325 MG/1
650 TABLET ORAL
OUTPATIENT
Start: 2025-07-22

## 2025-07-15 RX ORDER — DIPHENHYDRAMINE HYDROCHLORIDE 50 MG/ML
50 INJECTION, SOLUTION INTRAMUSCULAR; INTRAVENOUS
OUTPATIENT
Start: 2025-07-15

## 2025-07-15 RX ORDER — DIPHENHYDRAMINE HYDROCHLORIDE 50 MG/ML
50 INJECTION, SOLUTION INTRAMUSCULAR; INTRAVENOUS
Status: CANCELLED | OUTPATIENT
Start: 2025-07-15

## 2025-07-15 RX ORDER — SODIUM CHLORIDE 9 MG/ML
5-250 INJECTION, SOLUTION INTRAVENOUS PRN
Status: CANCELLED | OUTPATIENT
Start: 2025-07-15

## 2025-07-15 RX ORDER — SODIUM CHLORIDE 9 MG/ML
INJECTION, SOLUTION INTRAVENOUS PRN
OUTPATIENT
Start: 2025-07-22

## 2025-07-15 RX ORDER — EPINEPHRINE 1 MG/ML
0.3 INJECTION, SOLUTION, CONCENTRATE INTRAVENOUS PRN
Status: DISCONTINUED | OUTPATIENT
Start: 2025-07-15 | End: 2025-07-16 | Stop reason: HOSPADM

## 2025-07-15 RX ORDER — MEPERIDINE HYDROCHLORIDE 50 MG/ML
12.5 INJECTION INTRAMUSCULAR; INTRAVENOUS; SUBCUTANEOUS PRN
OUTPATIENT
Start: 2025-07-22

## 2025-07-15 RX ORDER — ACETAMINOPHEN 325 MG/1
650 TABLET ORAL
OUTPATIENT
Start: 2025-07-15

## 2025-07-15 RX ORDER — FAMOTIDINE 10 MG/ML
20 INJECTION, SOLUTION INTRAVENOUS
Status: CANCELLED | OUTPATIENT
Start: 2025-07-15

## 2025-07-15 RX ORDER — HEPARIN SODIUM (PORCINE) LOCK FLUSH IV SOLN 100 UNIT/ML 100 UNIT/ML
500 SOLUTION INTRAVENOUS PRN
OUTPATIENT
Start: 2025-07-22

## 2025-07-15 RX ORDER — HYDROCORTISONE SODIUM SUCCINATE 100 MG/2ML
100 INJECTION INTRAMUSCULAR; INTRAVENOUS
Status: DISCONTINUED | OUTPATIENT
Start: 2025-07-15 | End: 2025-07-16 | Stop reason: HOSPADM

## 2025-07-15 RX ORDER — HEPARIN SODIUM (PORCINE) LOCK FLUSH IV SOLN 100 UNIT/ML 100 UNIT/ML
500 SOLUTION INTRAVENOUS PRN
Status: CANCELLED | OUTPATIENT
Start: 2025-07-15

## 2025-07-15 RX ORDER — EPINEPHRINE 1 MG/ML
0.3 INJECTION, SOLUTION, CONCENTRATE INTRAVENOUS PRN
OUTPATIENT
Start: 2025-07-15

## 2025-07-15 RX ORDER — SODIUM CHLORIDE 0.9 % (FLUSH) 0.9 %
5-40 SYRINGE (ML) INJECTION PRN
OUTPATIENT
Start: 2025-07-15

## 2025-07-15 RX ORDER — ONDANSETRON 2 MG/ML
8 INJECTION INTRAMUSCULAR; INTRAVENOUS
Status: CANCELLED | OUTPATIENT
Start: 2025-07-15

## 2025-07-15 RX ORDER — ONDANSETRON 2 MG/ML
8 INJECTION INTRAMUSCULAR; INTRAVENOUS ONCE
OUTPATIENT
Start: 2025-07-22 | End: 2025-07-22

## 2025-07-15 RX ORDER — MEPERIDINE HYDROCHLORIDE 25 MG/ML
12.5 INJECTION INTRAMUSCULAR; INTRAVENOUS; SUBCUTANEOUS PRN
Status: DISCONTINUED | OUTPATIENT
Start: 2025-07-15 | End: 2025-07-16 | Stop reason: HOSPADM

## 2025-07-15 RX ORDER — SODIUM CHLORIDE 0.9 % (FLUSH) 0.9 %
5-40 SYRINGE (ML) INJECTION PRN
Status: CANCELLED | OUTPATIENT
Start: 2025-07-15

## 2025-07-15 RX ORDER — PROCHLORPERAZINE EDISYLATE 5 MG/ML
5 INJECTION INTRAMUSCULAR; INTRAVENOUS
Status: CANCELLED | OUTPATIENT
Start: 2025-07-15

## 2025-07-15 RX ORDER — ACETAMINOPHEN 325 MG/1
650 TABLET ORAL
Status: DISCONTINUED | OUTPATIENT
Start: 2025-07-15 | End: 2025-07-16 | Stop reason: HOSPADM

## 2025-07-15 RX ORDER — SODIUM CHLORIDE 9 MG/ML
INJECTION, SOLUTION INTRAVENOUS PRN
Status: CANCELLED | OUTPATIENT
Start: 2025-07-15

## 2025-07-15 RX ORDER — SODIUM CHLORIDE 9 MG/ML
5-250 INJECTION, SOLUTION INTRAVENOUS PRN
Status: DISCONTINUED | OUTPATIENT
Start: 2025-07-15 | End: 2025-07-16 | Stop reason: HOSPADM

## 2025-07-15 RX ORDER — HYDROCORTISONE SODIUM SUCCINATE 100 MG/2ML
100 INJECTION INTRAMUSCULAR; INTRAVENOUS
OUTPATIENT
Start: 2025-07-22

## 2025-07-15 RX ORDER — HEPARIN SODIUM (PORCINE) LOCK FLUSH IV SOLN 100 UNIT/ML 100 UNIT/ML
500 SOLUTION INTRAVENOUS PRN
OUTPATIENT
Start: 2025-07-15

## 2025-07-15 RX ORDER — FAMOTIDINE 10 MG/ML
20 INJECTION, SOLUTION INTRAVENOUS
OUTPATIENT
Start: 2025-07-15

## 2025-07-15 RX ORDER — DIPHENHYDRAMINE HYDROCHLORIDE 50 MG/ML
50 INJECTION, SOLUTION INTRAMUSCULAR; INTRAVENOUS
Status: DISCONTINUED | OUTPATIENT
Start: 2025-07-15 | End: 2025-07-16 | Stop reason: HOSPADM

## 2025-07-15 RX ORDER — MEPERIDINE HYDROCHLORIDE 50 MG/ML
12.5 INJECTION INTRAMUSCULAR; INTRAVENOUS; SUBCUTANEOUS PRN
Status: CANCELLED | OUTPATIENT
Start: 2025-07-15

## 2025-07-15 RX ORDER — HYDROCORTISONE SODIUM SUCCINATE 100 MG/2ML
100 INJECTION INTRAMUSCULAR; INTRAVENOUS
OUTPATIENT
Start: 2025-07-15

## 2025-07-15 RX ORDER — PROCHLORPERAZINE EDISYLATE 5 MG/ML
5 INJECTION INTRAMUSCULAR; INTRAVENOUS
OUTPATIENT
Start: 2025-07-22

## 2025-07-15 RX ORDER — HYDROCORTISONE SODIUM SUCCINATE 100 MG/2ML
100 INJECTION INTRAMUSCULAR; INTRAVENOUS
Status: CANCELLED | OUTPATIENT
Start: 2025-07-15

## 2025-07-15 RX ORDER — SODIUM CHLORIDE 9 MG/ML
INJECTION, SOLUTION INTRAVENOUS PRN
OUTPATIENT
Start: 2025-07-15

## 2025-07-15 RX ORDER — MEPERIDINE HYDROCHLORIDE 50 MG/ML
12.5 INJECTION INTRAMUSCULAR; INTRAVENOUS; SUBCUTANEOUS PRN
OUTPATIENT
Start: 2025-07-15

## 2025-07-15 RX ADMIN — CISPLATIN 154 MG: 1 INJECTION, SOLUTION INTRAVENOUS at 13:11

## 2025-07-15 RX ADMIN — SODIUM CHLORIDE 150 MG: 0.9 INJECTION, SOLUTION INTRAVENOUS at 12:07

## 2025-07-15 RX ADMIN — POTASSIUM CHLORIDE: 2 INJECTION, SOLUTION, CONCENTRATE INTRAVENOUS at 09:29

## 2025-07-15 RX ADMIN — GEMCITABINE HYDROCHLORIDE 2200 MG: 200 INJECTION, POWDER, LYOPHILIZED, FOR SOLUTION INTRAVENOUS at 12:37

## 2025-07-15 RX ADMIN — POTASSIUM CHLORIDE: 2 INJECTION, SOLUTION, CONCENTRATE INTRAVENOUS at 15:51

## 2025-07-15 RX ADMIN — DURVALUMAB 1500 MG: 500 INJECTION, SOLUTION INTRAVENOUS at 10:37

## 2025-07-15 RX ADMIN — SODIUM CHLORIDE, PRESERVATIVE FREE 20 ML: 5 INJECTION INTRAVENOUS at 07:15

## 2025-07-15 RX ADMIN — DEXAMETHASONE SODIUM PHOSPHATE 12 MG: 4 INJECTION, SOLUTION INTRAMUSCULAR; INTRAVENOUS at 11:39

## 2025-07-15 RX ADMIN — ONDANSETRON 8 MG: 2 INJECTION, SOLUTION INTRAMUSCULAR; INTRAVENOUS at 11:37

## 2025-07-15 RX ADMIN — SODIUM CHLORIDE 25 ML/HR: 0.9 INJECTION, SOLUTION INTRAVENOUS at 10:35

## 2025-07-15 RX ADMIN — SODIUM CHLORIDE, PRESERVATIVE FREE 10 ML: 5 INJECTION INTRAVENOUS at 16:51

## 2025-07-15 ASSESSMENT — PATIENT HEALTH QUESTIONNAIRE - PHQ9
SUM OF ALL RESPONSES TO PHQ QUESTIONS 1-9: 0
2. FEELING DOWN, DEPRESSED OR HOPELESS: NOT AT ALL
SUM OF ALL RESPONSES TO PHQ QUESTIONS 1-9: 0
SUM OF ALL RESPONSES TO PHQ QUESTIONS 1-9: 0
1. LITTLE INTEREST OR PLEASURE IN DOING THINGS: NOT AT ALL
SUM OF ALL RESPONSES TO PHQ QUESTIONS 1-9: 0

## 2025-07-15 NOTE — PROGRESS NOTES
Patient to port lab for port access and lab draw. Port accessed per protocol; using 20g 0.75\" phan needle without difficulty. Labs drawn from port and port flushed. Port remains accessed. Patient tolerated well. Discharged ambulatory.

## 2025-07-15 NOTE — PATIENT INSTRUCTIONS
Patient Information from Today's Visit    The members of your [facility] are listed below:    Physician Provider: Dr. Jeferson Harris  Advanced Practice Clinician: Madelin Tan  Registered Nurse: Abundio GARCES   Nurse Navigator: Renate MURPHY RN  Medical Assistant: Sara \"Winsome\" TANA.   : Gisela \"[place]\" C.   Supportive Care Services: [facility] [place]    Diagnosis (Information Sheet Provided on Day of Diagnosis): Bladder Cancer    Follow Up Instructions:   As scheduled    Has Treatment Plan Been Finalized? Yes - see prior treatment plan documentation    Current Labs:   Hospital Outpatient Visit on 07/15/2025   Component Date Value Ref Range Status    Magnesium 07/15/2025 2.3  1.8 - 2.4 mg/dL Final    Sodium 07/15/2025 139  136 - 145 mmol/L Final    Potassium 07/15/2025 4.3  3.5 - 5.1 mmol/L Final    Chloride 07/15/2025 105  98 - 107 mmol/L Final    CO2 07/15/2025 23  20 - 29 mmol/L Final    Anion Gap 07/15/2025 11  7 - 16 mmol/L Final    Glucose 07/15/2025 100 (H)  70 - 99 mg/dL Final    Comment: <70 mg/dL Consistent with, but not fully diagnostic of hypoglycemia.  100 - 125 mg/dL Impaired fasting glucose/consistent with pre-diabetes mellitus.  > 126 mg/dl Fasting glucose consistent with overt diabetes mellitus      BUN 07/15/2025 12  6 - 23 MG/DL Final    Creatinine 07/15/2025 0.84  0.60 - 1.10 MG/DL Final    Est, Glom Filt Rate 07/15/2025 80  >60 ml/min/1.73m2 Final    Comment:    Pediatric calculator link: https://www.kidney.org/professionals/kdoqi/gfr_calculatorped     These results are not intended for use in patients <18 years of age.     eGFR results are calculated without a race factor using  the 2021 CKD-EPI equation. Careful clinical correlation is recommended, particularly when comparing to results calculated using previous equations.  The CKD-EPI equation is less accurate in patients with extremes of muscle mass, extra-renal metabolism of creatinine, excessive creatine ingestion, or following

## 2025-07-15 NOTE — PROGRESS NOTES
Arrived to the Infusion Center.  D1C1 Durvalumab, Cisplatin and Gemzar with pre and post hydration completed. Patient tolerated without probems.   Any issues or concerns during appointment: Electronic Consent obtained today. Written instructions for use of home nausea medication reviewed with patient and her sister..  Patient aware of next infusion appointment on 7/22/25 (date) at 0830   Patient instructed to call provider with temperature of 100.4 or greater or nausea/vomiting/ diarrhea or pain not controlled by medications  Discharged ambulatory with family.

## 2025-07-16 ENCOUNTER — TELEPHONE (OUTPATIENT)
Dept: ONCOLOGY | Age: 60
End: 2025-07-16

## 2025-07-16 LAB — HBV CORE AB SERPL QL IA: NEGATIVE

## 2025-07-16 NOTE — TELEPHONE ENCOUNTER
Physician provider: Dr. Harris  Reason for today's call (Please detail here patients chief complaint): headache    Last office visit:n/a  Patient Callback Number: [phone]  Was callback number verified?: Yes  Preferred pharmacy (If refill request): n/a  Veriified that patient confirmed no refills left at pharmacy? :No  Has the patient called the office for this concern within the last 48 hours?:No    Red Word Mentioned  Warm Transfer Phone Call to (Name): none    Patient notified that their information will be routed to the appropriate team for review. Patient is advised that they will receive a phone call from the appropriate department. If awaiting a call from the triage department and symptoms worsen before receiving a call back, the patient has been advised to proceed to the nearest ED.              Pt's sister states pt has a persistent headache. Pt took a Zofran 07/16/2025 0400, and Tylenol 500 mg 07/16/2025 0945. Pt would like to know what to do for her headache and if she can take more Tylenol and how often she can take the Tylenol.     [phone]

## 2025-07-16 NOTE — TELEPHONE ENCOUNTER
Triage Level: Patient meets criteria for home management    Situation    Patient Oncology/ Hematology Diagnosis:Bladder  Clinical Question/ Complaint: Headache.  Denies visual disturbance.  Denies gait disturbance.        Background    Last OV Date & Provider: Dr Harris 7/15/25  Is the patient currently receiving treatment?: Yes, Chemotherapy/ immunotherapy/ Targeted Therapy (Name): CISplatin 70 mg/m2 + gemcitabine 1000 mg/m2  Has the patient previously called about this concern with no improvement within the last 2 weeks?: No    Assessment    Initial Onset of Symptoms: Yesterday  Precipitating Factors: unk  Relieving Factors (Medications, etc): unk  Has the patient taken all medications prescribed to aid in symptom management?: yes - Tylenol      Recommendation    Page Number of Telephone Triage for Oncology Nurses Referenced: na  Plan: Home Management Instructions Given (Please detail): Informed Zofran can cause headaches and constipation.  Informed ok to use Tylenol prn NTE 3GM/24 hours.  Verbalized understanding.

## 2025-07-17 ENCOUNTER — CLINICAL DOCUMENTATION (OUTPATIENT)
Dept: CASE MANAGEMENT | Age: 60
End: 2025-07-17

## 2025-07-17 NOTE — PROGRESS NOTES
Nurse Navigation outreach related to post C1D1 symptom management    Other Called pt to follow up after C1D1. Left VM with contact information requesting a call back.    Goal of next outreach: Survivorship  Time Spent: 5 minutes

## 2025-07-22 ENCOUNTER — HOSPITAL ENCOUNTER (OUTPATIENT)
Dept: INFUSION THERAPY | Age: 60
Setting detail: INFUSION SERIES
End: 2025-07-22

## 2025-07-22 ENCOUNTER — HOSPITAL ENCOUNTER (OUTPATIENT)
Dept: LAB | Age: 60
Discharge: HOME | End: 2025-07-22

## 2025-07-22 ENCOUNTER — OFFICE VISIT (OUTPATIENT)
Dept: ONCOLOGY | Age: 60
End: 2025-07-22

## 2025-07-22 VITALS
OXYGEN SATURATION: 99 % | BODY MASS INDEX: 32.29 KG/M2 | HEIGHT: 69 IN | DIASTOLIC BLOOD PRESSURE: 80 MMHG | SYSTOLIC BLOOD PRESSURE: 134 MMHG | WEIGHT: 218 LBS | TEMPERATURE: 98.3 F | RESPIRATION RATE: 22 BRPM | HEART RATE: 86 BPM

## 2025-07-22 DIAGNOSIS — T45.1X5A CHEMOTHERAPY INDUCED NEUTROPENIA: ICD-10-CM

## 2025-07-22 DIAGNOSIS — D70.1 CHEMOTHERAPY INDUCED NEUTROPENIA: ICD-10-CM

## 2025-07-22 DIAGNOSIS — C67.8 MALIGNANT NEOPLASM OF OVERLAPPING SITES OF BLADDER (HCC): ICD-10-CM

## 2025-07-22 DIAGNOSIS — C67.8 MALIGNANT NEOPLASM OF OVERLAPPING SITES OF BLADDER (HCC): Primary | ICD-10-CM

## 2025-07-22 LAB
ALBUMIN SERPL-MCNC: 3.6 G/DL (ref 3.5–5)
ALBUMIN/GLOB SERPL: 1.2 (ref 1–1.9)
ALP SERPL-CCNC: 77 U/L (ref 35–104)
ALT SERPL-CCNC: 35 U/L (ref 8–45)
ANION GAP SERPL CALC-SCNC: 13 MMOL/L (ref 7–16)
AST SERPL-CCNC: 25 U/L (ref 15–37)
BASOPHILS # BLD: 0.03 K/UL (ref 0–0.2)
BASOPHILS NFR BLD: 1.2 % (ref 0–2)
BILIRUB SERPL-MCNC: <0.2 MG/DL (ref 0–1.2)
BUN SERPL-MCNC: 9 MG/DL (ref 6–23)
CALCIUM SERPL-MCNC: 9.1 MG/DL (ref 8.8–10.2)
CHLORIDE SERPL-SCNC: 106 MMOL/L (ref 98–107)
CO2 SERPL-SCNC: 20 MMOL/L (ref 20–29)
CREAT SERPL-MCNC: 0.83 MG/DL (ref 0.6–1.1)
DIFFERENTIAL METHOD BLD: ABNORMAL
EOSINOPHIL # BLD: 0.09 K/UL (ref 0–0.8)
EOSINOPHIL NFR BLD: 3.6 % (ref 0.5–7.8)
ERYTHROCYTE [DISTWIDTH] IN BLOOD BY AUTOMATED COUNT: 12.9 % (ref 11.9–14.6)
GLOBULIN SER CALC-MCNC: 2.9 G/DL (ref 2.3–3.5)
GLUCOSE SERPL-MCNC: 87 MG/DL (ref 70–99)
HCT VFR BLD AUTO: 37 % (ref 35.8–46.3)
HGB BLD-MCNC: 12.3 G/DL (ref 11.7–15.4)
IMM GRANULOCYTES # BLD AUTO: 0.01 K/UL (ref 0–0.5)
IMM GRANULOCYTES NFR BLD AUTO: 0.4 % (ref 0–5)
LYMPHOCYTES # BLD: 1.67 K/UL (ref 0.5–4.6)
LYMPHOCYTES NFR BLD: 67.6 % (ref 13–44)
MCH RBC QN AUTO: 32.7 PG (ref 26.1–32.9)
MCHC RBC AUTO-ENTMCNC: 33.2 G/DL (ref 31.4–35)
MCV RBC AUTO: 98.4 FL (ref 82–102)
MONOCYTES # BLD: 0.05 K/UL (ref 0.1–1.3)
MONOCYTES NFR BLD: 2 % (ref 4–12)
NEUTS SEG # BLD: 0.62 K/UL (ref 1.7–8.2)
NEUTS SEG NFR BLD: 25.2 % (ref 43–78)
NRBC # BLD: 0 K/UL (ref 0–0.2)
PLATELET # BLD AUTO: 111 K/UL (ref 150–450)
PMV BLD AUTO: 11.5 FL (ref 9.4–12.3)
POTASSIUM SERPL-SCNC: 3.9 MMOL/L (ref 3.5–5.1)
PROT SERPL-MCNC: 6.5 G/DL (ref 6.3–8.2)
RBC # BLD AUTO: 3.76 M/UL (ref 4.05–5.2)
SODIUM SERPL-SCNC: 139 MMOL/L (ref 136–145)
WBC # BLD AUTO: 2.5 K/UL (ref 4.3–11.1)

## 2025-07-22 PROCEDURE — 85025 COMPLETE CBC W/AUTO DIFF WBC: CPT

## 2025-07-22 PROCEDURE — 2500000003 HC RX 250 WO HCPCS: Performed by: INTERNAL MEDICINE

## 2025-07-22 PROCEDURE — 80053 COMPREHEN METABOLIC PANEL: CPT

## 2025-07-22 PROCEDURE — 36591 DRAW BLOOD OFF VENOUS DEVICE: CPT

## 2025-07-22 PROCEDURE — 99214 OFFICE O/P EST MOD 30 MIN: CPT | Performed by: INTERNAL MEDICINE

## 2025-07-22 RX ORDER — ALBUTEROL SULFATE 90 UG/1
4 INHALANT RESPIRATORY (INHALATION) PRN
OUTPATIENT
Start: 2025-08-12

## 2025-07-22 RX ORDER — HEPARIN SODIUM (PORCINE) LOCK FLUSH IV SOLN 100 UNIT/ML 100 UNIT/ML
500 SOLUTION INTRAVENOUS PRN
OUTPATIENT
Start: 2025-08-12

## 2025-07-22 RX ORDER — HYDROCORTISONE SODIUM SUCCINATE 100 MG/2ML
100 INJECTION INTRAMUSCULAR; INTRAVENOUS
OUTPATIENT
Start: 2025-08-12

## 2025-07-22 RX ORDER — SODIUM CHLORIDE 0.9 % (FLUSH) 0.9 %
5-40 SYRINGE (ML) INJECTION PRN
Status: DISCONTINUED | OUTPATIENT
Start: 2025-07-22 | End: 2025-07-23 | Stop reason: HOSPADM

## 2025-07-22 RX ORDER — SODIUM CHLORIDE 9 MG/ML
5-250 INJECTION, SOLUTION INTRAVENOUS PRN
OUTPATIENT
Start: 2025-08-12

## 2025-07-22 RX ORDER — FAMOTIDINE 10 MG/ML
20 INJECTION, SOLUTION INTRAVENOUS
OUTPATIENT
Start: 2025-08-12

## 2025-07-22 RX ORDER — ACETAMINOPHEN 325 MG/1
650 TABLET ORAL
OUTPATIENT
Start: 2025-08-12

## 2025-07-22 RX ORDER — MEPERIDINE HYDROCHLORIDE 50 MG/ML
12.5 INJECTION INTRAMUSCULAR; INTRAVENOUS; SUBCUTANEOUS PRN
OUTPATIENT
Start: 2025-08-12

## 2025-07-22 RX ORDER — DIPHENHYDRAMINE HYDROCHLORIDE 50 MG/ML
50 INJECTION, SOLUTION INTRAMUSCULAR; INTRAVENOUS
OUTPATIENT
Start: 2025-08-12

## 2025-07-22 RX ORDER — ONDANSETRON 2 MG/ML
8 INJECTION INTRAMUSCULAR; INTRAVENOUS
OUTPATIENT
Start: 2025-08-12

## 2025-07-22 RX ORDER — SODIUM CHLORIDE 9 MG/ML
INJECTION, SOLUTION INTRAVENOUS PRN
OUTPATIENT
Start: 2025-08-12

## 2025-07-22 RX ORDER — EPINEPHRINE 1 MG/ML
0.3 INJECTION, SOLUTION, CONCENTRATE INTRAVENOUS PRN
OUTPATIENT
Start: 2025-08-12

## 2025-07-22 RX ORDER — SODIUM CHLORIDE 0.9 % (FLUSH) 0.9 %
5-40 SYRINGE (ML) INJECTION PRN
OUTPATIENT
Start: 2025-08-12

## 2025-07-22 RX ADMIN — SODIUM CHLORIDE, PRESERVATIVE FREE 20 ML: 5 INJECTION INTRAVENOUS at 07:15

## 2025-07-22 ASSESSMENT — PATIENT HEALTH QUESTIONNAIRE - PHQ9
1. LITTLE INTEREST OR PLEASURE IN DOING THINGS: NOT AT ALL
2. FEELING DOWN, DEPRESSED OR HOPELESS: NOT AT ALL
SUM OF ALL RESPONSES TO PHQ QUESTIONS 1-9: 0

## 2025-07-22 NOTE — PROGRESS NOTES
Port de-accessed. No bleeding noted, site clean, dry and intact. Band-aid and gauze applied.    
cystectomy may not be necessary. Repeat TURBT was completed with the pathology report confirming malignancy in two areas of the bladder, with one area showing muscle invasion. The PET scan showed no evidence of metastasis outside the bladder. The treatment plan includes a 3-month course of chemotherapy and immunotherapy followed by a cystectomy. This would be the NIAGARA regimen with cisplatin, gemcitabine, and durvalumab.     History of Present Illness  The patient presents for follow-up of bladder carcinoma and cycle 1 day 8 of NIAGARA.    She reports no adverse effects following her recent treatment, which she attributes to the consistent use of ondansetron (Zofran). However, she experienced cephalalgia as a side effect of ondansetron and subsequently switched to prochlorperazine (Compazine). She has not experienced pyrexia and maintains a good appetite, with adequate nutritional and fluid intake. Currently, she is experiencing mild nausea. She is on a regimen of ondansetron and prochlorperazine.    The patient notes a significant improvement in her bladder symptoms, describing her condition as almost normal. She is currently on oxybutynin.         Review of Systems:  Constitutional: Negative.   HENT: Negative.   Eyes: Negative.   Respiratory: Negative.   Cardiovascular: Negative.   Gastrointestinal: Positive for nausea.   Genitourinary: Negative.   Musculoskeletal: Negative.   Skin: Negative.   Neurological: Negative.   Endo/Heme/Allergies: Negative.   Psychiatric/Behavioral: Negative.   All other systems reviewed and are negative.     No Known Allergies  Past Medical History:   Diagnosis Date    Elbow dislocation     right    H/O transurethral resection of bladder tumor (TURBT)     Malignant neoplasm of bladder (HCC)     Spinal stenosis of lumbar region with neurogenic claudication 2022    epidural injections    UTI (urinary tract infection)     Wrist fracture     right     Past Surgical History:   Procedure

## 2025-07-22 NOTE — PATIENT INSTRUCTIONS
secretion.      Calcium 07/22/2025 9.1  8.8 - 10.2 MG/DL Final    Total Bilirubin 07/22/2025 <0.2  0.0 - 1.2 MG/DL Final    ALT 07/22/2025 35  8 - 45 U/L Final    AST 07/22/2025 25  15 - 37 U/L Final    Alk Phosphatase 07/22/2025 77  35 - 104 U/L Final    Total Protein 07/22/2025 6.5  6.3 - 8.2 g/dL Final    Albumin 07/22/2025 3.6  3.5 - 5.0 g/dL Final    Globulin 07/22/2025 2.9  2.3 - 3.5 g/dL Final    Albumin/Globulin Ratio 07/22/2025 1.2  1.0 - 1.9   Final    WBC 07/22/2025 2.5 (L)  4.3 - 11.1 K/uL Final    RBC 07/22/2025 3.76 (L)  4.05 - 5.2 M/uL Final    Hemoglobin 07/22/2025 12.3  11.7 - 15.4 g/dL Final    Hematocrit 07/22/2025 37.0  35.8 - 46.3 % Final    MCV 07/22/2025 98.4  82.0 - 102.0 FL Final    MCH 07/22/2025 32.7  26.1 - 32.9 PG Final    MCHC 07/22/2025 33.2  31.4 - 35.0 g/dL Final    RDW 07/22/2025 12.9  11.9 - 14.6 % Final    Platelets 07/22/2025 111 (L)  150 - 450 K/uL Final    MPV 07/22/2025 11.5  9.4 - 12.3 FL Final    nRBC 07/22/2025 0.00  0.0 - 0.2 K/uL Final    **Note: Absolute NRBC parameter is now reported with Hemogram**    Neutrophils % 07/22/2025 25.2 (L)  43.0 - 78.0 % Final    Lymphocytes % 07/22/2025 67.6 (H)  13.0 - 44.0 % Final    Monocytes % 07/22/2025 2.0 (L)  4.0 - 12.0 % Final    Eosinophils % 07/22/2025 3.6  0.5 - 7.8 % Final    Basophils % 07/22/2025 1.2  0.0 - 2.0 % Final    Immature Granulocytes % 07/22/2025 0.4  0.0 - 5.0 % Final    Neutrophils Absolute 07/22/2025 0.62 (L)  1.70 - 8.20 K/UL Final    Lymphocytes Absolute 07/22/2025 1.67  0.50 - 4.60 K/UL Final    Monocytes Absolute 07/22/2025 0.05 (L)  0.10 - 1.30 K/UL Final    Eosinophils Absolute 07/22/2025 0.09  0.00 - 0.80 K/UL Final    Basophils Absolute 07/22/2025 0.03  0.00 - 0.20 K/UL Final    Immature Granulocytes Absolute 07/22/2025 0.01  0.00 - 0.50 K/UL Final    Differential Type 07/22/2025 AUTOMATED    Final           Please refer to After Visit Summary or MyChart for upcoming appointment information. Please call

## 2025-07-29 ENCOUNTER — HOSPITAL ENCOUNTER (OUTPATIENT)
Dept: INFUSION THERAPY | Age: 60
Setting detail: INFUSION SERIES
Discharge: HOME | End: 2025-07-29

## 2025-07-29 VITALS
DIASTOLIC BLOOD PRESSURE: 83 MMHG | WEIGHT: 224 LBS | RESPIRATION RATE: 16 BRPM | TEMPERATURE: 97.7 F | HEART RATE: 76 BPM | SYSTOLIC BLOOD PRESSURE: 127 MMHG | BODY MASS INDEX: 33.08 KG/M2 | OXYGEN SATURATION: 98 %

## 2025-07-29 DIAGNOSIS — C67.8 MALIGNANT NEOPLASM OF OVERLAPPING SITES OF BLADDER (HCC): Primary | ICD-10-CM

## 2025-07-29 LAB
ALBUMIN SERPL-MCNC: 3.5 G/DL (ref 3.5–5)
ALBUMIN/GLOB SERPL: 1.3 (ref 1–1.9)
ALP SERPL-CCNC: 80 U/L (ref 35–104)
ALT SERPL-CCNC: 16 U/L (ref 8–45)
ANION GAP SERPL CALC-SCNC: 12 MMOL/L (ref 7–16)
AST SERPL-CCNC: 17 U/L (ref 15–37)
BASOPHILS # BLD: 0.02 K/UL (ref 0–0.2)
BASOPHILS NFR BLD: 0.5 % (ref 0–2)
BILIRUB SERPL-MCNC: <0.2 MG/DL (ref 0–1.2)
BUN SERPL-MCNC: 12 MG/DL (ref 6–23)
CALCIUM SERPL-MCNC: 9 MG/DL (ref 8.8–10.2)
CHLORIDE SERPL-SCNC: 105 MMOL/L (ref 98–107)
CO2 SERPL-SCNC: 22 MMOL/L (ref 20–29)
CREAT SERPL-MCNC: 0.82 MG/DL (ref 0.6–1.1)
DIFFERENTIAL METHOD BLD: ABNORMAL
EOSINOPHIL # BLD: 0.1 K/UL (ref 0–0.8)
EOSINOPHIL NFR BLD: 2.4 % (ref 0.5–7.8)
ERYTHROCYTE [DISTWIDTH] IN BLOOD BY AUTOMATED COUNT: 13.4 % (ref 11.9–14.6)
GLOBULIN SER CALC-MCNC: 2.7 G/DL (ref 2.3–3.5)
GLUCOSE SERPL-MCNC: 136 MG/DL (ref 70–99)
HBV SURFACE AB SERPL IA-ACNC: <3.5 MIU/ML
HBV SURFACE AG SER QL: NONREACTIVE
HCT VFR BLD AUTO: 36 % (ref 35.8–46.3)
HGB BLD-MCNC: 12 G/DL (ref 11.7–15.4)
IMM GRANULOCYTES # BLD AUTO: 0.01 K/UL (ref 0–0.5)
IMM GRANULOCYTES NFR BLD AUTO: 0.2 % (ref 0–5)
LYMPHOCYTES # BLD: 1.34 K/UL (ref 0.5–4.6)
LYMPHOCYTES NFR BLD: 32 % (ref 13–44)
MCH RBC QN AUTO: 33.2 PG (ref 26.1–32.9)
MCHC RBC AUTO-ENTMCNC: 33.3 G/DL (ref 31.4–35)
MCV RBC AUTO: 99.7 FL (ref 82–102)
MONOCYTES # BLD: 0.19 K/UL (ref 0.1–1.3)
MONOCYTES NFR BLD: 4.5 % (ref 4–12)
NEUTS SEG # BLD: 2.53 K/UL (ref 1.7–8.2)
NEUTS SEG NFR BLD: 60.4 % (ref 43–78)
NRBC # BLD: 0 K/UL (ref 0–0.2)
PLATELET # BLD AUTO: 142 K/UL (ref 150–450)
PMV BLD AUTO: 10.7 FL (ref 9.4–12.3)
POTASSIUM SERPL-SCNC: 3.9 MMOL/L (ref 3.5–5.1)
PROT SERPL-MCNC: 6.2 G/DL (ref 6.3–8.2)
RBC # BLD AUTO: 3.61 M/UL (ref 4.05–5.2)
SODIUM SERPL-SCNC: 139 MMOL/L (ref 136–145)
WBC # BLD AUTO: 4.2 K/UL (ref 4.3–11.1)

## 2025-07-29 PROCEDURE — 87340 HEPATITIS B SURFACE AG IA: CPT

## 2025-07-29 PROCEDURE — 96375 TX/PRO/DX INJ NEW DRUG ADDON: CPT

## 2025-07-29 PROCEDURE — 86706 HEP B SURFACE ANTIBODY: CPT

## 2025-07-29 PROCEDURE — 2580000003 HC RX 258: Performed by: INTERNAL MEDICINE

## 2025-07-29 PROCEDURE — 86704 HEP B CORE ANTIBODY TOTAL: CPT

## 2025-07-29 PROCEDURE — 96413 CHEMO IV INFUSION 1 HR: CPT

## 2025-07-29 PROCEDURE — 6360000002 HC RX W HCPCS: Performed by: INTERNAL MEDICINE

## 2025-07-29 PROCEDURE — 80053 COMPREHEN METABOLIC PANEL: CPT

## 2025-07-29 PROCEDURE — 2500000003 HC RX 250 WO HCPCS: Performed by: INTERNAL MEDICINE

## 2025-07-29 PROCEDURE — 85025 COMPLETE CBC W/AUTO DIFF WBC: CPT

## 2025-07-29 RX ORDER — SODIUM CHLORIDE 0.9 % (FLUSH) 0.9 %
5-40 SYRINGE (ML) INJECTION PRN
Status: DISCONTINUED | OUTPATIENT
Start: 2025-07-29 | End: 2025-07-30 | Stop reason: HOSPADM

## 2025-07-29 RX ORDER — SODIUM CHLORIDE 9 MG/ML
5-250 INJECTION, SOLUTION INTRAVENOUS PRN
Status: DISCONTINUED | OUTPATIENT
Start: 2025-07-29 | End: 2025-07-30 | Stop reason: HOSPADM

## 2025-07-29 RX ORDER — ONDANSETRON 2 MG/ML
8 INJECTION INTRAMUSCULAR; INTRAVENOUS ONCE
Status: DISCONTINUED | OUTPATIENT
Start: 2025-07-29 | End: 2025-07-30 | Stop reason: HOSPADM

## 2025-07-29 RX ORDER — HYDROCORTISONE SODIUM SUCCINATE 100 MG/2ML
100 INJECTION INTRAMUSCULAR; INTRAVENOUS
Status: DISCONTINUED | OUTPATIENT
Start: 2025-07-29 | End: 2025-07-30 | Stop reason: HOSPADM

## 2025-07-29 RX ORDER — ONDANSETRON 2 MG/ML
8 INJECTION INTRAMUSCULAR; INTRAVENOUS
Status: DISCONTINUED | OUTPATIENT
Start: 2025-07-29 | End: 2025-07-30 | Stop reason: HOSPADM

## 2025-07-29 RX ORDER — ALBUTEROL SULFATE 90 UG/1
4 INHALANT RESPIRATORY (INHALATION) PRN
Status: DISCONTINUED | OUTPATIENT
Start: 2025-07-29 | End: 2025-07-30 | Stop reason: HOSPADM

## 2025-07-29 RX ORDER — EPINEPHRINE 1 MG/ML
0.3 INJECTION, SOLUTION, CONCENTRATE INTRAVENOUS PRN
Status: DISCONTINUED | OUTPATIENT
Start: 2025-07-29 | End: 2025-07-30 | Stop reason: HOSPADM

## 2025-07-29 RX ORDER — PROCHLORPERAZINE EDISYLATE 5 MG/ML
5 INJECTION INTRAMUSCULAR; INTRAVENOUS
Status: COMPLETED | OUTPATIENT
Start: 2025-07-29 | End: 2025-07-29

## 2025-07-29 RX ORDER — SODIUM CHLORIDE 9 MG/ML
INJECTION, SOLUTION INTRAVENOUS PRN
Status: DISCONTINUED | OUTPATIENT
Start: 2025-07-29 | End: 2025-07-30 | Stop reason: HOSPADM

## 2025-07-29 RX ORDER — MEPERIDINE HYDROCHLORIDE 25 MG/ML
12.5 INJECTION INTRAMUSCULAR; INTRAVENOUS; SUBCUTANEOUS PRN
Status: DISCONTINUED | OUTPATIENT
Start: 2025-07-29 | End: 2025-07-30 | Stop reason: HOSPADM

## 2025-07-29 RX ORDER — ACETAMINOPHEN 325 MG/1
650 TABLET ORAL
Status: DISCONTINUED | OUTPATIENT
Start: 2025-07-29 | End: 2025-07-30 | Stop reason: HOSPADM

## 2025-07-29 RX ORDER — DIPHENHYDRAMINE HYDROCHLORIDE 50 MG/ML
50 INJECTION, SOLUTION INTRAMUSCULAR; INTRAVENOUS
Status: DISCONTINUED | OUTPATIENT
Start: 2025-07-29 | End: 2025-07-30 | Stop reason: HOSPADM

## 2025-07-29 RX ADMIN — SODIUM CHLORIDE, PRESERVATIVE FREE 10 ML: 5 INJECTION INTRAVENOUS at 08:59

## 2025-07-29 RX ADMIN — SODIUM CHLORIDE 100 ML/HR: 0.9 INJECTION, SOLUTION INTRAVENOUS at 09:03

## 2025-07-29 RX ADMIN — PROCHLORPERAZINE EDISYLATE 5 MG: 5 INJECTION INTRAMUSCULAR; INTRAVENOUS at 09:02

## 2025-07-29 RX ADMIN — GEMCITABINE HYDROCHLORIDE 2200 MG: 200 INJECTION, POWDER, LYOPHILIZED, FOR SOLUTION INTRAVENOUS at 09:29

## 2025-07-29 ASSESSMENT — PAIN DESCRIPTION - ONSET: ONSET: ON-GOING

## 2025-07-29 ASSESSMENT — PAIN DESCRIPTION - LOCATION: LOCATION: BACK;NECK

## 2025-07-29 ASSESSMENT — PAIN SCALES - GENERAL: PAINLEVEL_OUTOF10: 3

## 2025-07-29 ASSESSMENT — PAIN DESCRIPTION - ORIENTATION: ORIENTATION: LOWER

## 2025-07-29 ASSESSMENT — PAIN DESCRIPTION - PAIN TYPE: TYPE: CHRONIC PAIN

## 2025-07-29 ASSESSMENT — PAIN DESCRIPTION - DESCRIPTORS: DESCRIPTORS: ACHING;DISCOMFORT

## 2025-07-29 NOTE — PROGRESS NOTES
Spiritual Health Progress Note  [facility]      Room # Room/bed info not found    Name: Aurora Wilhelm           Age: 59 y.o.    Gender: female          MRN: [id number]  Protestant: Zoroastrian       Preferred Language: English      Date: 07/29/25  Visit Time: Begin Time: 0905 End Time : 0922         Visit Summary:   's initial visit to convey care and concern and to assess any spiritual/emotional needs. Aurora, patient's preferred name, was joined by her cousin visiting from [place]. I was pleased to learn that Aurora's family is providing excellent support. I actively listened as Aurora shared about her life, including her journey with cancer and her positive outlook. At the close of the visit I offered prayer and expressed gratitude for the visit.  follow-up is available upon request/referral.       Encounter Overview/Reason: Initial Encounter   Service Provided For: Patient and family together     Patient was available.    Kindra, Belief, Meaning:   Patient is connected with a kindra tradition or spiritual practice  has beliefs or practices that help with coping during difficult times  Family/Friends are connected with a kindra tradition or spiritual practice  Rituals (if applicable)      Importance and Influence:  Patient may have spiritual/personal beliefs that influence decisions regarding their health  Family/Friends may have spiritual/personal beliefs that influence decisions regarding the patient's health    Community:  Patient   indicated that they feel well-supported  Family/Friends   No family/ friends present.    Assessment and Plan of Care:   Emotions Expressed by Patient:   Assessment: Coping, Calm    Interventions by :   Intervention: Active listening, Discussed belief system/Spiritism practices/kindra, Discussed illness and its impact, Discussed relationship with God, Explored/Affirmed feelings, thoughts, concerns, Explored Coping Skills/Resources, Prayer

## 2025-07-29 NOTE — PROGRESS NOTES
Arrived to the Infusion Center ambulatory with family.  C1D8 Gemzar infusion completed. Patient tolerated well.   Any issues or concerns during appointment: none.  Patient aware of next infusion appointment on 7/30/2025 (date) at 1600 (time).  Patient aware of next lab and [facility] office visit on 8/12/2025 (date) at 0730 (time).  Patient instructed to call provider with temperature of 100.4 or greater or nausea/vomiting/ diarrhea or pain not controlled by medications  Discharged home ambulatory with family.

## 2025-07-30 ENCOUNTER — HOSPITAL ENCOUNTER (OUTPATIENT)
Dept: INFUSION THERAPY | Age: 60
Setting detail: INFUSION SERIES
Discharge: HOME | End: 2025-07-30

## 2025-07-30 VITALS
SYSTOLIC BLOOD PRESSURE: 144 MMHG | TEMPERATURE: 98 F | RESPIRATION RATE: 16 BRPM | OXYGEN SATURATION: 97 % | DIASTOLIC BLOOD PRESSURE: 84 MMHG | HEART RATE: 75 BPM

## 2025-07-30 DIAGNOSIS — C67.8 MALIGNANT NEOPLASM OF OVERLAPPING SITES OF BLADDER (HCC): Primary | ICD-10-CM

## 2025-07-30 LAB — HBV CORE AB SERPL QL IA: NEGATIVE

## 2025-07-30 PROCEDURE — 96372 THER/PROPH/DIAG INJ SC/IM: CPT

## 2025-07-30 PROCEDURE — 6360000002 HC RX W HCPCS: Performed by: INTERNAL MEDICINE

## 2025-07-30 RX ADMIN — PEGFILGRASTIM-JMDB 6 MG: 6 INJECTION SUBCUTANEOUS at 15:59

## 2025-07-30 ASSESSMENT — PAIN SCALES - GENERAL: PAINLEVEL_OUTOF10: 3

## 2025-07-30 ASSESSMENT — PAIN DESCRIPTION - PAIN TYPE: TYPE: CHRONIC PAIN

## 2025-07-30 ASSESSMENT — PAIN DESCRIPTION - LOCATION: LOCATION: BACK

## 2025-07-30 NOTE — PROGRESS NOTES
Arrived to the Infusion Center.  GCSF injection completed.   Provided education on medication     Patient instructed to report any side affects to ordering provider.  Patient tolerated well .   Any issues or concerns during appointment: none.  Patient aware of next infusion appointment on 8/12/25 (date) at 8:45 (time).  Discharged ambulatory.

## 2025-08-05 ENCOUNTER — APPOINTMENT (OUTPATIENT)
Dept: INFUSION THERAPY | Age: 60
End: 2025-08-05

## 2025-08-12 ENCOUNTER — OFFICE VISIT (OUTPATIENT)
Dept: ONCOLOGY | Age: 60
End: 2025-08-12

## 2025-08-12 ENCOUNTER — HOSPITAL ENCOUNTER (OUTPATIENT)
Dept: INFUSION THERAPY | Age: 60
Setting detail: INFUSION SERIES
Discharge: HOME | End: 2025-08-12

## 2025-08-12 ENCOUNTER — HOSPITAL ENCOUNTER (OUTPATIENT)
Dept: LAB | Age: 60
Discharge: HOME | End: 2025-08-12

## 2025-08-12 VITALS
HEIGHT: 69 IN | OXYGEN SATURATION: 97 % | BODY MASS INDEX: 32.58 KG/M2 | TEMPERATURE: 98.3 F | WEIGHT: 220 LBS | DIASTOLIC BLOOD PRESSURE: 86 MMHG | HEART RATE: 84 BPM | RESPIRATION RATE: 22 BRPM | SYSTOLIC BLOOD PRESSURE: 134 MMHG

## 2025-08-12 DIAGNOSIS — R11.0 CHEMOTHERAPY-INDUCED NAUSEA: Primary | ICD-10-CM

## 2025-08-12 DIAGNOSIS — D70.1 CHEMOTHERAPY INDUCED NEUTROPENIA: ICD-10-CM

## 2025-08-12 DIAGNOSIS — T45.1X5A CHEMOTHERAPY INDUCED NEUTROPENIA: ICD-10-CM

## 2025-08-12 DIAGNOSIS — R11.0 NAUSEA: ICD-10-CM

## 2025-08-12 DIAGNOSIS — C67.8 MALIGNANT NEOPLASM OF OVERLAPPING SITES OF BLADDER (HCC): Primary | ICD-10-CM

## 2025-08-12 DIAGNOSIS — T45.1X5A CHEMOTHERAPY-INDUCED NAUSEA: Primary | ICD-10-CM

## 2025-08-12 DIAGNOSIS — R11.0 NAUSEA: Primary | ICD-10-CM

## 2025-08-12 LAB
ALBUMIN SERPL-MCNC: 3.6 G/DL (ref 3.5–5)
ALBUMIN/GLOB SERPL: 1.2 (ref 1–1.9)
ALP SERPL-CCNC: 101 U/L (ref 35–104)
ALT SERPL-CCNC: 15 U/L (ref 8–45)
ANION GAP SERPL CALC-SCNC: 13 MMOL/L (ref 7–16)
AST SERPL-CCNC: 19 U/L (ref 15–37)
BASOPHILS # BLD: 0.06 K/UL (ref 0–0.2)
BASOPHILS NFR BLD: 0.5 % (ref 0–2)
BILIRUB SERPL-MCNC: 0.2 MG/DL (ref 0–1.2)
BUN SERPL-MCNC: 11 MG/DL (ref 6–23)
CALCIUM SERPL-MCNC: 9.1 MG/DL (ref 8.8–10.2)
CHLORIDE SERPL-SCNC: 100 MMOL/L (ref 98–107)
CO2 SERPL-SCNC: 23 MMOL/L (ref 20–29)
CREAT SERPL-MCNC: 0.81 MG/DL (ref 0.6–1.1)
DIFFERENTIAL METHOD BLD: ABNORMAL
EOSINOPHIL # BLD: 0.08 K/UL (ref 0–0.8)
EOSINOPHIL NFR BLD: 0.7 % (ref 0.5–7.8)
ERYTHROCYTE [DISTWIDTH] IN BLOOD BY AUTOMATED COUNT: 15 % (ref 11.9–14.6)
GLOBULIN SER CALC-MCNC: 3.1 G/DL (ref 2.3–3.5)
GLUCOSE SERPL-MCNC: 102 MG/DL (ref 70–99)
HCT VFR BLD AUTO: 35.3 % (ref 35.8–46.3)
HGB BLD-MCNC: 11.8 G/DL (ref 11.7–15.4)
IMM GRANULOCYTES # BLD AUTO: 0.2 K/UL (ref 0–0.5)
IMM GRANULOCYTES NFR BLD AUTO: 1.8 % (ref 0–5)
LYMPHOCYTES # BLD: 2.46 K/UL (ref 0.5–4.6)
LYMPHOCYTES NFR BLD: 22.3 % (ref 13–44)
MAGNESIUM SERPL-MCNC: 2.3 MG/DL (ref 1.8–2.4)
MCH RBC QN AUTO: 33.1 PG (ref 26.1–32.9)
MCHC RBC AUTO-ENTMCNC: 33.4 G/DL (ref 31.4–35)
MCV RBC AUTO: 99.2 FL (ref 82–102)
MONOCYTES # BLD: 1.05 K/UL (ref 0.1–1.3)
MONOCYTES NFR BLD: 9.5 % (ref 4–12)
NEUTS SEG # BLD: 7.16 K/UL (ref 1.7–8.2)
NEUTS SEG NFR BLD: 65.2 % (ref 43–78)
NRBC # BLD: 0 K/UL (ref 0–0.2)
PLATELET # BLD AUTO: 219 K/UL (ref 150–450)
PMV BLD AUTO: 10.3 FL (ref 9.4–12.3)
POTASSIUM SERPL-SCNC: 4.4 MMOL/L (ref 3.5–5.1)
PROT SERPL-MCNC: 6.7 G/DL (ref 6.3–8.2)
RBC # BLD AUTO: 3.56 M/UL (ref 4.05–5.2)
SODIUM SERPL-SCNC: 136 MMOL/L (ref 136–145)
WBC # BLD AUTO: 11 K/UL (ref 4.3–11.1)

## 2025-08-12 PROCEDURE — 80053 COMPREHEN METABOLIC PANEL: CPT

## 2025-08-12 PROCEDURE — 96367 TX/PROPH/DG ADDL SEQ IV INF: CPT

## 2025-08-12 PROCEDURE — 96366 THER/PROPH/DIAG IV INF ADDON: CPT

## 2025-08-12 PROCEDURE — 2580000003 HC RX 258: Performed by: INTERNAL MEDICINE

## 2025-08-12 PROCEDURE — 6360000002 HC RX W HCPCS: Performed by: INTERNAL MEDICINE

## 2025-08-12 PROCEDURE — 96375 TX/PRO/DX INJ NEW DRUG ADDON: CPT

## 2025-08-12 PROCEDURE — 99214 OFFICE O/P EST MOD 30 MIN: CPT | Performed by: INTERNAL MEDICINE

## 2025-08-12 PROCEDURE — 96376 TX/PRO/DX INJ SAME DRUG ADON: CPT

## 2025-08-12 PROCEDURE — 96413 CHEMO IV INFUSION 1 HR: CPT

## 2025-08-12 PROCEDURE — 83735 ASSAY OF MAGNESIUM: CPT

## 2025-08-12 PROCEDURE — 6360000002 HC RX W HCPCS: Performed by: NURSE PRACTITIONER

## 2025-08-12 PROCEDURE — 96415 CHEMO IV INFUSION ADDL HR: CPT

## 2025-08-12 PROCEDURE — 85025 COMPLETE CBC W/AUTO DIFF WBC: CPT

## 2025-08-12 PROCEDURE — 96417 CHEMO IV INFUS EACH ADDL SEQ: CPT

## 2025-08-12 PROCEDURE — 2500000003 HC RX 250 WO HCPCS: Performed by: INTERNAL MEDICINE

## 2025-08-12 RX ORDER — HYDROCORTISONE SODIUM SUCCINATE 100 MG/2ML
100 INJECTION INTRAMUSCULAR; INTRAVENOUS
Status: DISCONTINUED | OUTPATIENT
Start: 2025-08-12 | End: 2025-08-13 | Stop reason: HOSPADM

## 2025-08-12 RX ORDER — ONDANSETRON 2 MG/ML
8 INJECTION INTRAMUSCULAR; INTRAVENOUS
Status: DISCONTINUED | OUTPATIENT
Start: 2025-08-12 | End: 2025-08-13 | Stop reason: HOSPADM

## 2025-08-12 RX ORDER — SODIUM CHLORIDE 9 MG/ML
5-250 INJECTION, SOLUTION INTRAVENOUS PRN
Status: CANCELLED | OUTPATIENT
Start: 2025-08-12

## 2025-08-12 RX ORDER — PROCHLORPERAZINE EDISYLATE 5 MG/ML
10 INJECTION INTRAMUSCULAR; INTRAVENOUS ONCE
Status: COMPLETED | OUTPATIENT
Start: 2025-08-12 | End: 2025-08-12

## 2025-08-12 RX ORDER — ALBUTEROL SULFATE 90 UG/1
4 INHALANT RESPIRATORY (INHALATION) PRN
Status: CANCELLED | OUTPATIENT
Start: 2025-08-12

## 2025-08-12 RX ORDER — DIPHENHYDRAMINE HYDROCHLORIDE 50 MG/ML
50 INJECTION, SOLUTION INTRAMUSCULAR; INTRAVENOUS
Status: DISCONTINUED | OUTPATIENT
Start: 2025-08-12 | End: 2025-08-13 | Stop reason: HOSPADM

## 2025-08-12 RX ORDER — FAMOTIDINE 10 MG/ML
20 INJECTION, SOLUTION INTRAVENOUS
Status: CANCELLED | OUTPATIENT
Start: 2025-08-12

## 2025-08-12 RX ORDER — SODIUM CHLORIDE 0.9 % (FLUSH) 0.9 %
5-40 SYRINGE (ML) INJECTION PRN
Status: CANCELLED | OUTPATIENT
Start: 2025-08-12

## 2025-08-12 RX ORDER — SODIUM CHLORIDE 9 MG/ML
INJECTION, SOLUTION INTRAVENOUS PRN
Status: CANCELLED | OUTPATIENT
Start: 2025-08-12

## 2025-08-12 RX ORDER — SODIUM CHLORIDE 0.9 % (FLUSH) 0.9 %
5-40 SYRINGE (ML) INJECTION PRN
Status: DISCONTINUED | OUTPATIENT
Start: 2025-08-12 | End: 2025-08-13 | Stop reason: HOSPADM

## 2025-08-12 RX ORDER — HEPARIN 100 UNIT/ML
500 SYRINGE INTRAVENOUS PRN
Status: DISCONTINUED | OUTPATIENT
Start: 2025-08-12 | End: 2025-08-13 | Stop reason: HOSPADM

## 2025-08-12 RX ORDER — ACETAMINOPHEN 325 MG/1
650 TABLET ORAL
OUTPATIENT
Start: 2025-08-19

## 2025-08-12 RX ORDER — SODIUM CHLORIDE 9 MG/ML
5-250 INJECTION, SOLUTION INTRAVENOUS PRN
OUTPATIENT
Start: 2025-08-19

## 2025-08-12 RX ORDER — LORAZEPAM 0.5 MG/1
0.5 TABLET ORAL EVERY 8 HOURS PRN
Qty: 30 TABLET | Refills: 1 | Status: SHIPPED | OUTPATIENT
Start: 2025-08-12 | End: 2025-09-11

## 2025-08-12 RX ORDER — SODIUM CHLORIDE 9 MG/ML
5-250 INJECTION, SOLUTION INTRAVENOUS PRN
Status: DISCONTINUED | OUTPATIENT
Start: 2025-08-12 | End: 2025-08-13 | Stop reason: HOSPADM

## 2025-08-12 RX ORDER — PROCHLORPERAZINE EDISYLATE 5 MG/ML
5 INJECTION INTRAMUSCULAR; INTRAVENOUS
Status: COMPLETED | OUTPATIENT
Start: 2025-08-12 | End: 2025-08-12

## 2025-08-12 RX ORDER — DIPHENHYDRAMINE HYDROCHLORIDE 50 MG/ML
50 INJECTION, SOLUTION INTRAMUSCULAR; INTRAVENOUS
Status: CANCELLED | OUTPATIENT
Start: 2025-08-12

## 2025-08-12 RX ORDER — MEPERIDINE HYDROCHLORIDE 25 MG/ML
12.5 INJECTION INTRAMUSCULAR; INTRAVENOUS; SUBCUTANEOUS PRN
Status: DISCONTINUED | OUTPATIENT
Start: 2025-08-12 | End: 2025-08-13 | Stop reason: HOSPADM

## 2025-08-12 RX ORDER — EPINEPHRINE 1 MG/ML
0.3 INJECTION, SOLUTION, CONCENTRATE INTRAVENOUS PRN
Status: CANCELLED | OUTPATIENT
Start: 2025-08-12

## 2025-08-12 RX ORDER — SODIUM CHLORIDE 9 MG/ML
INJECTION, SOLUTION INTRAVENOUS PRN
Status: DISCONTINUED | OUTPATIENT
Start: 2025-08-12 | End: 2025-08-13 | Stop reason: HOSPADM

## 2025-08-12 RX ORDER — HEPARIN SODIUM (PORCINE) LOCK FLUSH IV SOLN 100 UNIT/ML 100 UNIT/ML
500 SOLUTION INTRAVENOUS PRN
OUTPATIENT
Start: 2025-08-19

## 2025-08-12 RX ORDER — ONDANSETRON 2 MG/ML
8 INJECTION INTRAMUSCULAR; INTRAVENOUS
OUTPATIENT
Start: 2025-08-19

## 2025-08-12 RX ORDER — ONDANSETRON 2 MG/ML
8 INJECTION INTRAMUSCULAR; INTRAVENOUS
Status: CANCELLED | OUTPATIENT
Start: 2025-08-12

## 2025-08-12 RX ORDER — MEPERIDINE HYDROCHLORIDE 50 MG/ML
12.5 INJECTION INTRAMUSCULAR; INTRAVENOUS; SUBCUTANEOUS PRN
Status: CANCELLED | OUTPATIENT
Start: 2025-08-12

## 2025-08-12 RX ORDER — ALBUTEROL SULFATE 90 UG/1
4 INHALANT RESPIRATORY (INHALATION) PRN
Status: DISCONTINUED | OUTPATIENT
Start: 2025-08-12 | End: 2025-08-13 | Stop reason: HOSPADM

## 2025-08-12 RX ORDER — EPINEPHRINE 1 MG/ML
0.3 INJECTION, SOLUTION, CONCENTRATE INTRAVENOUS PRN
OUTPATIENT
Start: 2025-08-19

## 2025-08-12 RX ORDER — HYDROCORTISONE SODIUM SUCCINATE 100 MG/2ML
100 INJECTION INTRAMUSCULAR; INTRAVENOUS
Status: CANCELLED | OUTPATIENT
Start: 2025-08-12

## 2025-08-12 RX ORDER — FAMOTIDINE 10 MG/ML
20 INJECTION, SOLUTION INTRAVENOUS
OUTPATIENT
Start: 2025-08-19

## 2025-08-12 RX ORDER — HEPARIN SODIUM (PORCINE) LOCK FLUSH IV SOLN 100 UNIT/ML 100 UNIT/ML
500 SOLUTION INTRAVENOUS PRN
Status: CANCELLED | OUTPATIENT
Start: 2025-08-12

## 2025-08-12 RX ORDER — LORAZEPAM 2 MG/ML
0.5 INJECTION INTRAMUSCULAR
OUTPATIENT
Start: 2025-08-19

## 2025-08-12 RX ORDER — MEPERIDINE HYDROCHLORIDE 50 MG/ML
12.5 INJECTION INTRAMUSCULAR; INTRAVENOUS; SUBCUTANEOUS PRN
OUTPATIENT
Start: 2025-08-19

## 2025-08-12 RX ORDER — SODIUM CHLORIDE 0.9 % (FLUSH) 0.9 %
5-40 SYRINGE (ML) INJECTION PRN
OUTPATIENT
Start: 2025-08-19

## 2025-08-12 RX ORDER — ACETAMINOPHEN 325 MG/1
650 TABLET ORAL
Status: DISCONTINUED | OUTPATIENT
Start: 2025-08-12 | End: 2025-08-13 | Stop reason: HOSPADM

## 2025-08-12 RX ORDER — EPINEPHRINE 1 MG/ML
0.3 INJECTION, SOLUTION, CONCENTRATE INTRAVENOUS PRN
Status: DISCONTINUED | OUTPATIENT
Start: 2025-08-12 | End: 2025-08-13 | Stop reason: HOSPADM

## 2025-08-12 RX ORDER — ONDANSETRON 2 MG/ML
8 INJECTION INTRAMUSCULAR; INTRAVENOUS ONCE
Status: DISCONTINUED | OUTPATIENT
Start: 2025-08-12 | End: 2025-08-13 | Stop reason: HOSPADM

## 2025-08-12 RX ORDER — ACETAMINOPHEN 325 MG/1
650 TABLET ORAL
Status: CANCELLED | OUTPATIENT
Start: 2025-08-12

## 2025-08-12 RX ORDER — PROCHLORPERAZINE EDISYLATE 5 MG/ML
5 INJECTION INTRAMUSCULAR; INTRAVENOUS
OUTPATIENT
Start: 2025-08-19

## 2025-08-12 RX ORDER — PROCHLORPERAZINE EDISYLATE 5 MG/ML
5 INJECTION INTRAMUSCULAR; INTRAVENOUS
Status: CANCELLED | OUTPATIENT
Start: 2025-08-12

## 2025-08-12 RX ORDER — DIPHENHYDRAMINE HYDROCHLORIDE 50 MG/ML
50 INJECTION, SOLUTION INTRAMUSCULAR; INTRAVENOUS
OUTPATIENT
Start: 2025-08-19

## 2025-08-12 RX ORDER — HYDROCORTISONE SODIUM SUCCINATE 100 MG/2ML
100 INJECTION INTRAMUSCULAR; INTRAVENOUS
OUTPATIENT
Start: 2025-08-19

## 2025-08-12 RX ORDER — SODIUM CHLORIDE 9 MG/ML
INJECTION, SOLUTION INTRAVENOUS PRN
OUTPATIENT
Start: 2025-08-19

## 2025-08-12 RX ORDER — PROCHLORPERAZINE EDISYLATE 5 MG/ML
10 INJECTION INTRAMUSCULAR; INTRAVENOUS ONCE
Status: CANCELLED
Start: 2025-08-12 | End: 2025-08-12

## 2025-08-12 RX ORDER — ALBUTEROL SULFATE 90 UG/1
4 INHALANT RESPIRATORY (INHALATION) PRN
OUTPATIENT
Start: 2025-08-19

## 2025-08-12 RX ORDER — ONDANSETRON 2 MG/ML
8 INJECTION INTRAMUSCULAR; INTRAVENOUS ONCE
Status: CANCELLED | OUTPATIENT
Start: 2025-08-12 | End: 2025-08-12

## 2025-08-12 RX ORDER — ONDANSETRON 2 MG/ML
8 INJECTION INTRAMUSCULAR; INTRAVENOUS ONCE
OUTPATIENT
Start: 2025-08-19 | End: 2025-08-19

## 2025-08-12 RX ADMIN — SODIUM CHLORIDE, PRESERVATIVE FREE 10 ML: 5 INJECTION INTRAVENOUS at 07:25

## 2025-08-12 RX ADMIN — DEXAMETHASONE SODIUM PHOSPHATE 12 MG: 4 INJECTION INTRA-ARTICULAR; INTRALESIONAL; INTRAMUSCULAR; INTRAVENOUS; SOFT TISSUE at 11:38

## 2025-08-12 RX ADMIN — POTASSIUM CHLORIDE: 2 INJECTION, SOLUTION, CONCENTRATE INTRAVENOUS at 15:40

## 2025-08-12 RX ADMIN — GEMCITABINE HYDROCHLORIDE 2200 MG: 200 INJECTION, POWDER, LYOPHILIZED, FOR SOLUTION INTRAVENOUS at 12:27

## 2025-08-12 RX ADMIN — POTASSIUM CHLORIDE: 2 INJECTION, SOLUTION, CONCENTRATE INTRAVENOUS at 09:29

## 2025-08-12 RX ADMIN — SODIUM CHLORIDE: 0.9 INJECTION, SOLUTION INTRAVENOUS at 09:28

## 2025-08-12 RX ADMIN — PROCHLORPERAZINE EDISYLATE 5 MG: 5 INJECTION INTRAMUSCULAR; INTRAVENOUS at 11:36

## 2025-08-12 RX ADMIN — CISPLATIN 154 MG: 1 INJECTION, SOLUTION INTRAVENOUS at 13:00

## 2025-08-12 RX ADMIN — SODIUM CHLORIDE 150 MG: 0.9 INJECTION, SOLUTION INTRAVENOUS at 12:00

## 2025-08-12 RX ADMIN — DURVALUMAB 1500 MG: 500 INJECTION, SOLUTION INTRAVENOUS at 10:29

## 2025-08-12 RX ADMIN — PROCHLORPERAZINE EDISYLATE 10 MG: 5 INJECTION INTRAMUSCULAR; INTRAVENOUS at 15:53

## 2025-08-12 ASSESSMENT — PATIENT HEALTH QUESTIONNAIRE - PHQ9
1. LITTLE INTEREST OR PLEASURE IN DOING THINGS: NOT AT ALL
SUM OF ALL RESPONSES TO PHQ QUESTIONS 1-9: 0
2. FEELING DOWN, DEPRESSED OR HOPELESS: NOT AT ALL
SUM OF ALL RESPONSES TO PHQ QUESTIONS 1-9: 0

## 2025-08-12 ASSESSMENT — PAIN SCALES - GENERAL: PAINLEVEL_OUTOF10: 0

## 2025-08-19 ENCOUNTER — HOSPITAL ENCOUNTER (OUTPATIENT)
Dept: INFUSION THERAPY | Age: 60
Setting detail: INFUSION SERIES
Discharge: HOME | End: 2025-08-19

## 2025-08-19 ENCOUNTER — OFFICE VISIT (OUTPATIENT)
Dept: ONCOLOGY | Age: 60
End: 2025-08-19

## 2025-08-19 ENCOUNTER — HOSPITAL ENCOUNTER (OUTPATIENT)
Dept: LAB | Age: 60
Discharge: HOME | End: 2025-08-19

## 2025-08-19 VITALS
RESPIRATION RATE: 18 BRPM | BODY MASS INDEX: 32.23 KG/M2 | DIASTOLIC BLOOD PRESSURE: 83 MMHG | SYSTOLIC BLOOD PRESSURE: 129 MMHG | OXYGEN SATURATION: 97 % | WEIGHT: 217.6 LBS | HEART RATE: 83 BPM | TEMPERATURE: 97.8 F | HEIGHT: 69 IN

## 2025-08-19 DIAGNOSIS — D70.1 CHEMOTHERAPY INDUCED NEUTROPENIA: ICD-10-CM

## 2025-08-19 DIAGNOSIS — R11.2 CHEMOTHERAPY INDUCED NAUSEA AND VOMITING: ICD-10-CM

## 2025-08-19 DIAGNOSIS — R53.83 FATIGUE DUE TO TREATMENT: ICD-10-CM

## 2025-08-19 DIAGNOSIS — R11.0 NAUSEA: ICD-10-CM

## 2025-08-19 DIAGNOSIS — C67.8 MALIGNANT NEOPLASM OF OVERLAPPING SITES OF BLADDER (HCC): Primary | ICD-10-CM

## 2025-08-19 DIAGNOSIS — T45.1X5A CHEMOTHERAPY INDUCED NEUTROPENIA: ICD-10-CM

## 2025-08-19 DIAGNOSIS — C67.8 MALIGNANT NEOPLASM OF OVERLAPPING SITES OF BLADDER (HCC): ICD-10-CM

## 2025-08-19 DIAGNOSIS — T45.1X5A CHEMOTHERAPY INDUCED NAUSEA AND VOMITING: ICD-10-CM

## 2025-08-19 LAB
ALBUMIN SERPL-MCNC: 3.3 G/DL (ref 3.5–5)
ALBUMIN/GLOB SERPL: 1.1 (ref 1–1.9)
ALP SERPL-CCNC: 73 U/L (ref 35–104)
ALT SERPL-CCNC: 28 U/L (ref 8–45)
ANION GAP SERPL CALC-SCNC: 13 MMOL/L (ref 7–16)
AST SERPL-CCNC: 22 U/L (ref 15–37)
BASOPHILS # BLD: 0.03 K/UL (ref 0–0.2)
BASOPHILS NFR BLD: 1.2 % (ref 0–2)
BILIRUB SERPL-MCNC: 0.3 MG/DL (ref 0–1.2)
BUN SERPL-MCNC: 11 MG/DL (ref 6–23)
CALCIUM SERPL-MCNC: 8.9 MG/DL (ref 8.8–10.2)
CHLORIDE SERPL-SCNC: 102 MMOL/L (ref 98–107)
CO2 SERPL-SCNC: 21 MMOL/L (ref 20–29)
CREAT SERPL-MCNC: 0.8 MG/DL (ref 0.6–1.1)
DIFFERENTIAL METHOD BLD: ABNORMAL
EOSINOPHIL # BLD: 0.01 K/UL (ref 0–0.8)
EOSINOPHIL NFR BLD: 0.4 % (ref 0.5–7.8)
ERYTHROCYTE [DISTWIDTH] IN BLOOD BY AUTOMATED COUNT: 14.6 % (ref 11.9–14.6)
GLOBULIN SER CALC-MCNC: 3 G/DL (ref 2.3–3.5)
GLUCOSE SERPL-MCNC: 120 MG/DL (ref 70–99)
HCT VFR BLD AUTO: 32.6 % (ref 35.8–46.3)
HGB BLD-MCNC: 10.9 G/DL (ref 11.7–15.4)
IMM GRANULOCYTES # BLD AUTO: 0 K/UL (ref 0–0.5)
IMM GRANULOCYTES NFR BLD AUTO: 0 % (ref 0–5)
LYMPHOCYTES # BLD: 1.45 K/UL (ref 0.5–4.6)
LYMPHOCYTES NFR BLD: 59.4 % (ref 13–44)
MCH RBC QN AUTO: 33.3 PG (ref 26.1–32.9)
MCHC RBC AUTO-ENTMCNC: 33.4 G/DL (ref 31.4–35)
MCV RBC AUTO: 99.7 FL (ref 82–102)
MONOCYTES # BLD: 0.16 K/UL (ref 0.1–1.3)
MONOCYTES NFR BLD: 6.6 % (ref 4–12)
NEUTS SEG # BLD: 0.79 K/UL (ref 1.7–8.2)
NEUTS SEG NFR BLD: 32.4 % (ref 43–78)
NRBC # BLD: 0 K/UL (ref 0–0.2)
PLATELET # BLD AUTO: 179 K/UL (ref 150–450)
PMV BLD AUTO: 10 FL (ref 9.4–12.3)
POTASSIUM SERPL-SCNC: 4 MMOL/L (ref 3.5–5.1)
PROT SERPL-MCNC: 6.3 G/DL (ref 6.3–8.2)
RBC # BLD AUTO: 3.27 M/UL (ref 4.05–5.2)
SODIUM SERPL-SCNC: 136 MMOL/L (ref 136–145)
WBC # BLD AUTO: 2.4 K/UL (ref 4.3–11.1)

## 2025-08-19 PROCEDURE — 80053 COMPREHEN METABOLIC PANEL: CPT

## 2025-08-19 PROCEDURE — 6360000002 HC RX W HCPCS: Performed by: INTERNAL MEDICINE

## 2025-08-19 PROCEDURE — 2580000003 HC RX 258: Performed by: INTERNAL MEDICINE

## 2025-08-19 PROCEDURE — 99214 OFFICE O/P EST MOD 30 MIN: CPT | Performed by: NURSE PRACTITIONER

## 2025-08-19 PROCEDURE — 96375 TX/PRO/DX INJ NEW DRUG ADDON: CPT

## 2025-08-19 PROCEDURE — 96413 CHEMO IV INFUSION 1 HR: CPT

## 2025-08-19 PROCEDURE — 2500000003 HC RX 250 WO HCPCS: Performed by: INTERNAL MEDICINE

## 2025-08-19 PROCEDURE — 85025 COMPLETE CBC W/AUTO DIFF WBC: CPT

## 2025-08-19 RX ORDER — EPINEPHRINE 1 MG/ML
0.3 INJECTION, SOLUTION, CONCENTRATE INTRAVENOUS PRN
Status: DISCONTINUED | OUTPATIENT
Start: 2025-08-19 | End: 2025-08-20 | Stop reason: HOSPADM

## 2025-08-19 RX ORDER — PROCHLORPERAZINE EDISYLATE 5 MG/ML
5 INJECTION INTRAMUSCULAR; INTRAVENOUS
Status: COMPLETED | OUTPATIENT
Start: 2025-08-19 | End: 2025-08-19

## 2025-08-19 RX ORDER — ACETAMINOPHEN 325 MG/1
650 TABLET ORAL
Status: DISCONTINUED | OUTPATIENT
Start: 2025-08-19 | End: 2025-08-20 | Stop reason: HOSPADM

## 2025-08-19 RX ORDER — DIPHENHYDRAMINE HYDROCHLORIDE 50 MG/ML
50 INJECTION, SOLUTION INTRAMUSCULAR; INTRAVENOUS
Status: DISCONTINUED | OUTPATIENT
Start: 2025-08-19 | End: 2025-08-20 | Stop reason: HOSPADM

## 2025-08-19 RX ORDER — SODIUM CHLORIDE 0.9 % (FLUSH) 0.9 %
5-40 SYRINGE (ML) INJECTION PRN
Status: DISCONTINUED | OUTPATIENT
Start: 2025-08-19 | End: 2025-08-20 | Stop reason: HOSPADM

## 2025-08-19 RX ORDER — MEPERIDINE HYDROCHLORIDE 25 MG/ML
12.5 INJECTION INTRAMUSCULAR; INTRAVENOUS; SUBCUTANEOUS PRN
Status: DISCONTINUED | OUTPATIENT
Start: 2025-08-19 | End: 2025-08-20 | Stop reason: HOSPADM

## 2025-08-19 RX ORDER — ALBUTEROL SULFATE 90 UG/1
4 INHALANT RESPIRATORY (INHALATION) PRN
Status: DISCONTINUED | OUTPATIENT
Start: 2025-08-19 | End: 2025-08-20 | Stop reason: HOSPADM

## 2025-08-19 RX ORDER — HYDROCORTISONE SODIUM SUCCINATE 100 MG/2ML
100 INJECTION INTRAMUSCULAR; INTRAVENOUS
Status: DISCONTINUED | OUTPATIENT
Start: 2025-08-19 | End: 2025-08-20 | Stop reason: HOSPADM

## 2025-08-19 RX ORDER — OLANZAPINE 5 MG/1
5 TABLET, FILM COATED ORAL NIGHTLY
Qty: 30 TABLET | Refills: 1 | Status: SHIPPED | OUTPATIENT
Start: 2025-08-19

## 2025-08-19 RX ORDER — SODIUM CHLORIDE 9 MG/ML
5-250 INJECTION, SOLUTION INTRAVENOUS PRN
Status: DISCONTINUED | OUTPATIENT
Start: 2025-08-19 | End: 2025-08-20 | Stop reason: HOSPADM

## 2025-08-19 RX ORDER — ONDANSETRON 2 MG/ML
8 INJECTION INTRAMUSCULAR; INTRAVENOUS ONCE
Status: DISCONTINUED | OUTPATIENT
Start: 2025-08-19 | End: 2025-08-20 | Stop reason: HOSPADM

## 2025-08-19 RX ORDER — SODIUM CHLORIDE 9 MG/ML
INJECTION, SOLUTION INTRAVENOUS PRN
Status: DISCONTINUED | OUTPATIENT
Start: 2025-08-19 | End: 2025-08-20 | Stop reason: HOSPADM

## 2025-08-19 RX ORDER — ONDANSETRON 2 MG/ML
8 INJECTION INTRAMUSCULAR; INTRAVENOUS
Status: DISCONTINUED | OUTPATIENT
Start: 2025-08-19 | End: 2025-08-20 | Stop reason: HOSPADM

## 2025-08-19 RX ADMIN — SODIUM CHLORIDE, PRESERVATIVE FREE 10 ML: 5 INJECTION INTRAVENOUS at 09:30

## 2025-08-19 RX ADMIN — PROCHLORPERAZINE EDISYLATE 5 MG: 5 INJECTION INTRAMUSCULAR; INTRAVENOUS at 09:28

## 2025-08-19 RX ADMIN — SODIUM CHLORIDE, PRESERVATIVE FREE 20 ML: 5 INJECTION INTRAVENOUS at 07:15

## 2025-08-19 RX ADMIN — SODIUM CHLORIDE 100 ML/HR: 0.9 INJECTION, SOLUTION INTRAVENOUS at 09:31

## 2025-08-19 RX ADMIN — GEMCITABINE HYDROCHLORIDE 2200 MG: 200 INJECTION, POWDER, LYOPHILIZED, FOR SOLUTION INTRAVENOUS at 10:07

## 2025-08-19 ASSESSMENT — PAIN DESCRIPTION - ONSET: ONSET: ON-GOING

## 2025-08-19 ASSESSMENT — PAIN - FUNCTIONAL ASSESSMENT: PAIN_FUNCTIONAL_ASSESSMENT: ACTIVITIES ARE NOT PREVENTED

## 2025-08-19 ASSESSMENT — PAIN DESCRIPTION - LOCATION: LOCATION: BACK

## 2025-08-19 ASSESSMENT — PAIN SCALES - GENERAL: PAINLEVEL_OUTOF10: 4

## 2025-08-19 ASSESSMENT — PAIN DESCRIPTION - ORIENTATION: ORIENTATION: LOWER

## 2025-08-19 ASSESSMENT — PAIN DESCRIPTION - PAIN TYPE: TYPE: CHRONIC PAIN

## 2025-08-19 ASSESSMENT — PAIN DESCRIPTION - DESCRIPTORS: DESCRIPTORS: ACHING;SPASM

## 2025-08-20 ENCOUNTER — HOSPITAL ENCOUNTER (OUTPATIENT)
Dept: INFUSION THERAPY | Age: 60
Setting detail: INFUSION SERIES
Discharge: HOME | End: 2025-08-20

## 2025-08-20 VITALS
DIASTOLIC BLOOD PRESSURE: 72 MMHG | TEMPERATURE: 98.3 F | HEART RATE: 95 BPM | OXYGEN SATURATION: 94 % | SYSTOLIC BLOOD PRESSURE: 116 MMHG | RESPIRATION RATE: 16 BRPM

## 2025-08-20 DIAGNOSIS — R11.0 NAUSEA: ICD-10-CM

## 2025-08-20 DIAGNOSIS — C67.8 MALIGNANT NEOPLASM OF OVERLAPPING SITES OF BLADDER (HCC): Primary | ICD-10-CM

## 2025-08-20 PROCEDURE — 6360000002 HC RX W HCPCS: Performed by: INTERNAL MEDICINE

## 2025-08-20 PROCEDURE — 96372 THER/PROPH/DIAG INJ SC/IM: CPT

## 2025-08-20 RX ADMIN — PEGFILGRASTIM-JMDB 6 MG: 6 INJECTION SUBCUTANEOUS at 15:16

## 2025-08-20 ASSESSMENT — PAIN DESCRIPTION - LOCATION: LOCATION: BACK

## 2025-08-20 ASSESSMENT — PAIN SCALES - GENERAL: PAINLEVEL_OUTOF10: 5

## 2025-08-20 ASSESSMENT — PAIN DESCRIPTION - ORIENTATION: ORIENTATION: LOWER

## 2025-08-21 ENCOUNTER — PATIENT MESSAGE (OUTPATIENT)
Dept: ONCOLOGY | Age: 60
End: 2025-08-21

## 2025-08-22 ENCOUNTER — TELEPHONE (OUTPATIENT)
Dept: ONCOLOGY | Age: 60
End: 2025-08-22

## 2025-08-26 ENCOUNTER — APPOINTMENT (OUTPATIENT)
Dept: INFUSION THERAPY | Age: 60
End: 2025-08-26

## 2025-09-02 ENCOUNTER — HOSPITAL ENCOUNTER (OUTPATIENT)
Dept: LAB | Age: 60
Discharge: HOME | End: 2025-09-02

## 2025-09-02 ENCOUNTER — OFFICE VISIT (OUTPATIENT)
Dept: ONCOLOGY | Age: 60
End: 2025-09-02

## 2025-09-02 ENCOUNTER — HOSPITAL ENCOUNTER (OUTPATIENT)
Dept: INFUSION THERAPY | Age: 60
Setting detail: INFUSION SERIES
Discharge: HOME | End: 2025-09-02

## 2025-09-02 VITALS
SYSTOLIC BLOOD PRESSURE: 133 MMHG | HEART RATE: 91 BPM | BODY MASS INDEX: 33.03 KG/M2 | OXYGEN SATURATION: 99 % | DIASTOLIC BLOOD PRESSURE: 79 MMHG | TEMPERATURE: 98.1 F | WEIGHT: 223 LBS | HEIGHT: 69 IN | RESPIRATION RATE: 22 BRPM

## 2025-09-02 DIAGNOSIS — C67.8 MALIGNANT NEOPLASM OF OVERLAPPING SITES OF BLADDER (HCC): Primary | ICD-10-CM

## 2025-09-02 DIAGNOSIS — R11.0 NAUSEA: ICD-10-CM

## 2025-09-02 DIAGNOSIS — C67.8 MALIGNANT NEOPLASM OF OVERLAPPING SITES OF BLADDER (HCC): ICD-10-CM

## 2025-09-02 LAB
ALBUMIN SERPL-MCNC: 3.3 G/DL (ref 3.5–5)
ALBUMIN/GLOB SERPL: 1.1 (ref 1–1.9)
ALP SERPL-CCNC: 107 U/L (ref 35–104)
ALT SERPL-CCNC: 17 U/L (ref 8–45)
ANION GAP SERPL CALC-SCNC: 13 MMOL/L (ref 7–16)
AST SERPL-CCNC: 20 U/L (ref 15–37)
BASOPHILS # BLD: 0.06 K/UL (ref 0–0.2)
BASOPHILS NFR BLD: 0.7 % (ref 0–2)
BILIRUB SERPL-MCNC: <0.2 MG/DL (ref 0–1.2)
BUN SERPL-MCNC: 8 MG/DL (ref 6–23)
CALCIUM SERPL-MCNC: 8.9 MG/DL (ref 8.8–10.2)
CHLORIDE SERPL-SCNC: 102 MMOL/L (ref 98–107)
CO2 SERPL-SCNC: 22 MMOL/L (ref 20–29)
CREAT SERPL-MCNC: 0.84 MG/DL (ref 0.6–1.1)
DIFFERENTIAL METHOD BLD: ABNORMAL
EOSINOPHIL # BLD: 0.23 K/UL (ref 0–0.8)
EOSINOPHIL NFR BLD: 2.5 % (ref 0.5–7.8)
ERYTHROCYTE [DISTWIDTH] IN BLOOD BY AUTOMATED COUNT: 16.7 % (ref 11.9–14.6)
GLOBULIN SER CALC-MCNC: 3.1 G/DL (ref 2.3–3.5)
GLUCOSE SERPL-MCNC: 113 MG/DL (ref 70–99)
HCT VFR BLD AUTO: 29.9 % (ref 35.8–46.3)
HGB BLD-MCNC: 10 G/DL (ref 11.7–15.4)
IMM GRANULOCYTES # BLD AUTO: 0.23 K/UL (ref 0–0.5)
IMM GRANULOCYTES NFR BLD AUTO: 2.5 % (ref 0–5)
LYMPHOCYTES # BLD: 1.27 K/UL (ref 0.5–4.6)
LYMPHOCYTES NFR BLD: 13.8 % (ref 13–44)
MAGNESIUM SERPL-MCNC: 2.1 MG/DL (ref 1.8–2.4)
MCH RBC QN AUTO: 33.8 PG (ref 26.1–32.9)
MCHC RBC AUTO-ENTMCNC: 33.4 G/DL (ref 31.4–35)
MCV RBC AUTO: 101 FL (ref 82–102)
MONOCYTES # BLD: 0.85 K/UL (ref 0.1–1.3)
MONOCYTES NFR BLD: 9.2 % (ref 4–12)
NEUTS SEG # BLD: 6.56 K/UL (ref 1.7–8.2)
NEUTS SEG NFR BLD: 71.3 % (ref 43–78)
NRBC # BLD: 0 K/UL (ref 0–0.2)
PLATELET # BLD AUTO: 266 K/UL (ref 150–450)
PMV BLD AUTO: 10.6 FL (ref 9.4–12.3)
POTASSIUM SERPL-SCNC: 4.3 MMOL/L (ref 3.5–5.1)
PROT SERPL-MCNC: 6.4 G/DL (ref 6.3–8.2)
RBC # BLD AUTO: 2.96 M/UL (ref 4.05–5.2)
SODIUM SERPL-SCNC: 137 MMOL/L (ref 136–145)
WBC # BLD AUTO: 9.2 K/UL (ref 4.3–11.1)

## 2025-09-02 PROCEDURE — 2500000003 HC RX 250 WO HCPCS: Performed by: INTERNAL MEDICINE

## 2025-09-02 PROCEDURE — 96415 CHEMO IV INFUSION ADDL HR: CPT

## 2025-09-02 PROCEDURE — 83735 ASSAY OF MAGNESIUM: CPT

## 2025-09-02 PROCEDURE — 2580000003 HC RX 258: Performed by: INTERNAL MEDICINE

## 2025-09-02 PROCEDURE — 96375 TX/PRO/DX INJ NEW DRUG ADDON: CPT

## 2025-09-02 PROCEDURE — 96417 CHEMO IV INFUS EACH ADDL SEQ: CPT

## 2025-09-02 PROCEDURE — 96366 THER/PROPH/DIAG IV INF ADDON: CPT

## 2025-09-02 PROCEDURE — 6360000002 HC RX W HCPCS: Performed by: INTERNAL MEDICINE

## 2025-09-02 PROCEDURE — 96413 CHEMO IV INFUSION 1 HR: CPT

## 2025-09-02 PROCEDURE — 99214 OFFICE O/P EST MOD 30 MIN: CPT | Performed by: INTERNAL MEDICINE

## 2025-09-02 PROCEDURE — 85025 COMPLETE CBC W/AUTO DIFF WBC: CPT

## 2025-09-02 PROCEDURE — 96367 TX/PROPH/DG ADDL SEQ IV INF: CPT

## 2025-09-02 PROCEDURE — 96376 TX/PRO/DX INJ SAME DRUG ADON: CPT

## 2025-09-02 PROCEDURE — 6370000000 HC RX 637 (ALT 250 FOR IP): Performed by: INTERNAL MEDICINE

## 2025-09-02 PROCEDURE — 80053 COMPREHEN METABOLIC PANEL: CPT

## 2025-09-02 RX ORDER — SODIUM CHLORIDE 9 MG/ML
5-250 INJECTION, SOLUTION INTRAVENOUS PRN
OUTPATIENT
Start: 2025-09-09

## 2025-09-02 RX ORDER — ONDANSETRON 2 MG/ML
8 INJECTION INTRAMUSCULAR; INTRAVENOUS
Status: CANCELLED | OUTPATIENT
Start: 2025-09-02

## 2025-09-02 RX ORDER — DIPHENHYDRAMINE HYDROCHLORIDE 50 MG/ML
50 INJECTION, SOLUTION INTRAMUSCULAR; INTRAVENOUS
OUTPATIENT
Start: 2025-09-09

## 2025-09-02 RX ORDER — ONDANSETRON 2 MG/ML
8 INJECTION INTRAMUSCULAR; INTRAVENOUS ONCE
Status: CANCELLED | OUTPATIENT
Start: 2025-09-02 | End: 2025-09-02

## 2025-09-02 RX ORDER — ALBUTEROL SULFATE 90 UG/1
4 INHALANT RESPIRATORY (INHALATION) PRN
OUTPATIENT
Start: 2025-09-09

## 2025-09-02 RX ORDER — DIPHENHYDRAMINE HYDROCHLORIDE 50 MG/ML
50 INJECTION, SOLUTION INTRAMUSCULAR; INTRAVENOUS
OUTPATIENT
Start: 2025-09-02

## 2025-09-02 RX ORDER — ONDANSETRON 2 MG/ML
8 INJECTION INTRAMUSCULAR; INTRAVENOUS
OUTPATIENT
Start: 2025-09-02

## 2025-09-02 RX ORDER — HYDROCORTISONE SODIUM SUCCINATE 100 MG/2ML
100 INJECTION INTRAMUSCULAR; INTRAVENOUS
Status: CANCELLED | OUTPATIENT
Start: 2025-09-02

## 2025-09-02 RX ORDER — PROCHLORPERAZINE EDISYLATE 5 MG/ML
5 INJECTION INTRAMUSCULAR; INTRAVENOUS
Status: CANCELLED | OUTPATIENT
Start: 2025-09-02

## 2025-09-02 RX ORDER — EPINEPHRINE 1 MG/ML
0.3 INJECTION, SOLUTION, CONCENTRATE INTRAVENOUS PRN
OUTPATIENT
Start: 2025-09-02

## 2025-09-02 RX ORDER — SODIUM CHLORIDE 0.9 % (FLUSH) 0.9 %
5-40 SYRINGE (ML) INJECTION PRN
Status: DISCONTINUED | OUTPATIENT
Start: 2025-09-02 | End: 2025-09-03 | Stop reason: HOSPADM

## 2025-09-02 RX ORDER — SODIUM CHLORIDE 0.9 % (FLUSH) 0.9 %
5-40 SYRINGE (ML) INJECTION PRN
OUTPATIENT
Start: 2025-09-09

## 2025-09-02 RX ORDER — EPINEPHRINE 1 MG/ML
0.3 INJECTION, SOLUTION, CONCENTRATE INTRAVENOUS PRN
OUTPATIENT
Start: 2025-09-09

## 2025-09-02 RX ORDER — MEPERIDINE HYDROCHLORIDE 50 MG/ML
12.5 INJECTION INTRAMUSCULAR; INTRAVENOUS; SUBCUTANEOUS PRN
OUTPATIENT
Start: 2025-09-09

## 2025-09-02 RX ORDER — SODIUM CHLORIDE 9 MG/ML
INJECTION, SOLUTION INTRAVENOUS PRN
OUTPATIENT
Start: 2025-09-02

## 2025-09-02 RX ORDER — HYDROCORTISONE SODIUM SUCCINATE 100 MG/2ML
100 INJECTION INTRAMUSCULAR; INTRAVENOUS
OUTPATIENT
Start: 2025-09-02

## 2025-09-02 RX ORDER — ALBUTEROL SULFATE 90 UG/1
4 INHALANT RESPIRATORY (INHALATION) PRN
Status: CANCELLED | OUTPATIENT
Start: 2025-09-02

## 2025-09-02 RX ORDER — ONDANSETRON 2 MG/ML
8 INJECTION INTRAMUSCULAR; INTRAVENOUS ONCE
Status: CANCELLED | OUTPATIENT
Start: 2025-09-09 | End: 2025-09-09

## 2025-09-02 RX ORDER — SODIUM CHLORIDE 9 MG/ML
INJECTION, SOLUTION INTRAVENOUS PRN
Status: DISCONTINUED | OUTPATIENT
Start: 2025-09-02 | End: 2025-09-03 | Stop reason: HOSPADM

## 2025-09-02 RX ORDER — DIPHENHYDRAMINE HYDROCHLORIDE 50 MG/ML
50 INJECTION, SOLUTION INTRAMUSCULAR; INTRAVENOUS
Status: CANCELLED | OUTPATIENT
Start: 2025-09-02

## 2025-09-02 RX ORDER — LORAZEPAM 0.5 MG/1
1 TABLET ORAL EVERY 4 HOURS PRN
Status: DISCONTINUED | OUTPATIENT
Start: 2025-09-02 | End: 2025-09-03 | Stop reason: HOSPADM

## 2025-09-02 RX ORDER — EPINEPHRINE 1 MG/ML
0.3 INJECTION, SOLUTION, CONCENTRATE INTRAVENOUS PRN
Status: CANCELLED | OUTPATIENT
Start: 2025-09-02

## 2025-09-02 RX ORDER — HYDROCORTISONE SODIUM SUCCINATE 100 MG/2ML
100 INJECTION INTRAMUSCULAR; INTRAVENOUS
Status: DISCONTINUED | OUTPATIENT
Start: 2025-09-02 | End: 2025-09-03 | Stop reason: HOSPADM

## 2025-09-02 RX ORDER — PROCHLORPERAZINE EDISYLATE 5 MG/ML
5 INJECTION INTRAMUSCULAR; INTRAVENOUS
OUTPATIENT
Start: 2025-09-09

## 2025-09-02 RX ORDER — SODIUM CHLORIDE 9 MG/ML
INJECTION, SOLUTION INTRAVENOUS PRN
Status: CANCELLED | OUTPATIENT
Start: 2025-09-02

## 2025-09-02 RX ORDER — ACETAMINOPHEN 325 MG/1
650 TABLET ORAL
Status: CANCELLED | OUTPATIENT
Start: 2025-09-02

## 2025-09-02 RX ORDER — MEPERIDINE HYDROCHLORIDE 50 MG/ML
12.5 INJECTION INTRAMUSCULAR; INTRAVENOUS; SUBCUTANEOUS PRN
OUTPATIENT
Start: 2025-09-02

## 2025-09-02 RX ORDER — ALBUTEROL SULFATE 90 UG/1
4 INHALANT RESPIRATORY (INHALATION) PRN
OUTPATIENT
Start: 2025-09-02

## 2025-09-02 RX ORDER — SODIUM CHLORIDE 9 MG/ML
5-250 INJECTION, SOLUTION INTRAVENOUS PRN
Status: DISCONTINUED | OUTPATIENT
Start: 2025-09-02 | End: 2025-09-03 | Stop reason: HOSPADM

## 2025-09-02 RX ORDER — EPINEPHRINE 1 MG/ML
0.3 INJECTION, SOLUTION, CONCENTRATE INTRAVENOUS PRN
Status: DISCONTINUED | OUTPATIENT
Start: 2025-09-02 | End: 2025-09-03 | Stop reason: HOSPADM

## 2025-09-02 RX ORDER — SODIUM CHLORIDE 0.9 % (FLUSH) 0.9 %
5-40 SYRINGE (ML) INJECTION PRN
OUTPATIENT
Start: 2025-09-02

## 2025-09-02 RX ORDER — MEPERIDINE HYDROCHLORIDE 50 MG/ML
12.5 INJECTION INTRAMUSCULAR; INTRAVENOUS; SUBCUTANEOUS PRN
Status: CANCELLED | OUTPATIENT
Start: 2025-09-02

## 2025-09-02 RX ORDER — LORAZEPAM 2 MG/ML
0.5 INJECTION INTRAMUSCULAR
OUTPATIENT
Start: 2025-09-09

## 2025-09-02 RX ORDER — FAMOTIDINE 10 MG/ML
20 INJECTION, SOLUTION INTRAVENOUS
OUTPATIENT
Start: 2025-09-09

## 2025-09-02 RX ORDER — SODIUM CHLORIDE 0.9 % (FLUSH) 0.9 %
5-40 SYRINGE (ML) INJECTION PRN
Status: CANCELLED | OUTPATIENT
Start: 2025-09-02

## 2025-09-02 RX ORDER — MEPERIDINE HYDROCHLORIDE 25 MG/ML
12.5 INJECTION INTRAMUSCULAR; INTRAVENOUS; SUBCUTANEOUS PRN
Status: DISCONTINUED | OUTPATIENT
Start: 2025-09-02 | End: 2025-09-03 | Stop reason: HOSPADM

## 2025-09-02 RX ORDER — SODIUM CHLORIDE 9 MG/ML
5-250 INJECTION, SOLUTION INTRAVENOUS PRN
Status: CANCELLED | OUTPATIENT
Start: 2025-09-02

## 2025-09-02 RX ORDER — SODIUM CHLORIDE 9 MG/ML
5-250 INJECTION, SOLUTION INTRAVENOUS PRN
OUTPATIENT
Start: 2025-09-02

## 2025-09-02 RX ORDER — LORAZEPAM 0.5 MG/1
1 TABLET ORAL EVERY 4 HOURS PRN
Status: CANCELLED
Start: 2025-09-02

## 2025-09-02 RX ORDER — ONDANSETRON 2 MG/ML
8 INJECTION INTRAMUSCULAR; INTRAVENOUS
OUTPATIENT
Start: 2025-09-09

## 2025-09-02 RX ORDER — FAMOTIDINE 10 MG/ML
20 INJECTION, SOLUTION INTRAVENOUS
Status: CANCELLED | OUTPATIENT
Start: 2025-09-02

## 2025-09-02 RX ORDER — ACETAMINOPHEN 325 MG/1
650 TABLET ORAL
OUTPATIENT
Start: 2025-09-02

## 2025-09-02 RX ORDER — HEPARIN SODIUM (PORCINE) LOCK FLUSH IV SOLN 100 UNIT/ML 100 UNIT/ML
500 SOLUTION INTRAVENOUS PRN
OUTPATIENT
Start: 2025-09-09

## 2025-09-02 RX ORDER — SODIUM CHLORIDE 9 MG/ML
INJECTION, SOLUTION INTRAVENOUS PRN
OUTPATIENT
Start: 2025-09-09

## 2025-09-02 RX ORDER — ALBUTEROL SULFATE 90 UG/1
4 INHALANT RESPIRATORY (INHALATION) PRN
Status: DISCONTINUED | OUTPATIENT
Start: 2025-09-02 | End: 2025-09-03 | Stop reason: HOSPADM

## 2025-09-02 RX ORDER — PROCHLORPERAZINE EDISYLATE 5 MG/ML
5 INJECTION INTRAMUSCULAR; INTRAVENOUS
Status: COMPLETED | OUTPATIENT
Start: 2025-09-02 | End: 2025-09-02

## 2025-09-02 RX ORDER — ONDANSETRON 2 MG/ML
8 INJECTION INTRAMUSCULAR; INTRAVENOUS
Status: DISCONTINUED | OUTPATIENT
Start: 2025-09-02 | End: 2025-09-03 | Stop reason: HOSPADM

## 2025-09-02 RX ORDER — PROCHLORPERAZINE EDISYLATE 5 MG/ML
5 INJECTION INTRAMUSCULAR; INTRAVENOUS ONCE
Status: COMPLETED | OUTPATIENT
Start: 2025-09-02 | End: 2025-09-02

## 2025-09-02 RX ORDER — HEPARIN SODIUM (PORCINE) LOCK FLUSH IV SOLN 100 UNIT/ML 100 UNIT/ML
500 SOLUTION INTRAVENOUS PRN
OUTPATIENT
Start: 2025-09-02

## 2025-09-02 RX ORDER — FAMOTIDINE 10 MG/ML
20 INJECTION, SOLUTION INTRAVENOUS
OUTPATIENT
Start: 2025-09-02

## 2025-09-02 RX ORDER — HYDROCORTISONE SODIUM SUCCINATE 100 MG/2ML
100 INJECTION INTRAMUSCULAR; INTRAVENOUS
OUTPATIENT
Start: 2025-09-09

## 2025-09-02 RX ORDER — ACETAMINOPHEN 325 MG/1
650 TABLET ORAL
OUTPATIENT
Start: 2025-09-09

## 2025-09-02 RX ORDER — DIPHENHYDRAMINE HYDROCHLORIDE 50 MG/ML
50 INJECTION, SOLUTION INTRAMUSCULAR; INTRAVENOUS
Status: DISCONTINUED | OUTPATIENT
Start: 2025-09-02 | End: 2025-09-03 | Stop reason: HOSPADM

## 2025-09-02 RX ORDER — ACETAMINOPHEN 325 MG/1
650 TABLET ORAL
Status: DISCONTINUED | OUTPATIENT
Start: 2025-09-02 | End: 2025-09-03 | Stop reason: HOSPADM

## 2025-09-02 RX ADMIN — DEXAMETHASONE SODIUM PHOSPHATE 12 MG: 4 INJECTION INTRA-ARTICULAR; INTRALESIONAL; INTRAMUSCULAR; INTRAVENOUS; SOFT TISSUE at 11:53

## 2025-09-02 RX ADMIN — POTASSIUM CHLORIDE: 2 INJECTION, SOLUTION, CONCENTRATE INTRAVENOUS at 15:29

## 2025-09-02 RX ADMIN — SODIUM CHLORIDE, PRESERVATIVE FREE 20 ML: 5 INJECTION INTRAVENOUS at 07:48

## 2025-09-02 RX ADMIN — LORAZEPAM 1 MG: 0.5 TABLET ORAL at 15:26

## 2025-09-02 RX ADMIN — SODIUM CHLORIDE, PRESERVATIVE FREE 10 ML: 5 INJECTION INTRAVENOUS at 09:08

## 2025-09-02 RX ADMIN — PROCHLORPERAZINE EDISYLATE 5 MG: 5 INJECTION INTRAMUSCULAR; INTRAVENOUS at 11:47

## 2025-09-02 RX ADMIN — SODIUM CHLORIDE 100 ML/HR: 0.9 INJECTION, SOLUTION INTRAVENOUS at 09:08

## 2025-09-02 RX ADMIN — SODIUM CHLORIDE 150 MG: 0.9 INJECTION, SOLUTION INTRAVENOUS at 12:14

## 2025-09-02 RX ADMIN — GEMCITABINE HYDROCHLORIDE 2200 MG: 200 INJECTION, POWDER, LYOPHILIZED, FOR SOLUTION INTRAVENOUS at 12:38

## 2025-09-02 RX ADMIN — DURVALUMAB 1500 MG: 500 INJECTION, SOLUTION INTRAVENOUS at 09:43

## 2025-09-02 RX ADMIN — CISPLATIN 154 MG: 1 INJECTION, SOLUTION INTRAVENOUS at 13:25

## 2025-09-02 RX ADMIN — POTASSIUM CHLORIDE: 2 INJECTION, SOLUTION, CONCENTRATE INTRAVENOUS at 10:47

## 2025-09-02 RX ADMIN — SODIUM CHLORIDE, PRESERVATIVE FREE 10 ML: 5 INJECTION INTRAVENOUS at 16:35

## 2025-09-02 RX ADMIN — PROCHLORPERAZINE EDISYLATE 5 MG: 5 INJECTION INTRAMUSCULAR; INTRAVENOUS at 16:30

## 2025-09-02 ASSESSMENT — PATIENT HEALTH QUESTIONNAIRE - PHQ9
SUM OF ALL RESPONSES TO PHQ QUESTIONS 1-9: 0
SUM OF ALL RESPONSES TO PHQ QUESTIONS 1-9: 0
2. FEELING DOWN, DEPRESSED OR HOPELESS: NOT AT ALL
SUM OF ALL RESPONSES TO PHQ QUESTIONS 1-9: 0
SUM OF ALL RESPONSES TO PHQ QUESTIONS 1-9: 0
1. LITTLE INTEREST OR PLEASURE IN DOING THINGS: NOT AT ALL

## 2025-09-02 ASSESSMENT — PAIN SCALES - GENERAL: PAINLEVEL_OUTOF10: 3

## (undated) DEVICE — SYRINGE IRRIG 60ML SFT PLIABLE BLB EZ TO GRP 1 HND USE W/

## (undated) DEVICE — K-WIRE BLUNT/TROCAR
Type: IMPLANTABLE DEVICE | Site: FOOT | Status: NON-FUNCTIONAL
Removed: 2019-12-12

## (undated) DEVICE — HAND PACK: Brand: MEDLINE INDUSTRIES, INC.

## (undated) DEVICE — HEX DRIVER: Brand: MICA

## (undated) DEVICE — BANDAGE,GAUZE,CONFORMING,2"X75",STRL,LF: Brand: MEDLINE INDUSTRIES, INC.

## (undated) DEVICE — SOLUTION IRRIG 1000ML H2O PIC PLAS SHATTERPROOF CONTAINER

## (undated) DEVICE — DRSG GZ OIL EMUL CURAD 3X8 --

## (undated) DEVICE — PAD,NON-ADHERENT,3X8,STERILE,LF,1/PK: Brand: MEDLINE

## (undated) DEVICE — BIT DRL L40MM DIA2.5MM SLD SIDE CUT FOR GEMINUS VOLAR DST

## (undated) DEVICE — REM POLYHESIVE ADULT PATIENT RETURN ELECTRODE: Brand: VALLEYLAB

## (undated) DEVICE — GARMENT,MEDLINE,DVT,INT,CALF,MED, GEN2: Brand: MEDLINE

## (undated) DEVICE — PADDING CAST W2INXL4YD NONSTERILE COT COHESIVE HND TEARABLE

## (undated) DEVICE — DISPOSABLE BIPOLAR CODE, 12' (3.66 M): Brand: CONMED

## (undated) DEVICE — GOWN,REINFORCED,POLY,AURORA,XXLARGE,STR: Brand: MEDLINE

## (undated) DEVICE — MASTISOL ADHESIVE LIQ 2/3ML

## (undated) DEVICE — SOLUTION IRRIG 3000ML H2O STRL BAG

## (undated) DEVICE — GUIDE SURG DIA1.5MM AIMING FOR GEMINUS VOLAR DST RAD

## (undated) DEVICE — BUTTON SWITCH PENCIL BLADE ELECTRODE, HOLSTER: Brand: EDGE

## (undated) DEVICE — CATHETER URETH 24FR BLLN 5CC STD LTX HYDRGEL 2 W F BARDX

## (undated) DEVICE — CUTTING LOOP, 27FR. ANGLED, STERILE: Brand: N.A.

## (undated) DEVICE — WEDGE BURR: Brand: MICA

## (undated) DEVICE — SYRINGE,TOOMEY,IRRIGATION,70CC,STERILE: Brand: MEDLINE

## (undated) DEVICE — PADDING CAST W2INXL4YD ST COT COHESIVE HND TEARABLE SPEC

## (undated) DEVICE — DRAPE C ARM W54XL84IN MINI FOR OEC 6800

## (undated) DEVICE — BNDG,ELSTC,MATRIX,STRL,2"X5YD,LF,HOOK&LP: Brand: MEDLINE

## (undated) DEVICE — BURR: Brand: MICA

## (undated) DEVICE — SUT ETHLN 3-0 18IN PS1 BLK --

## (undated) DEVICE — FOOT & ANKLE SOFT DR WOMACK: Brand: MEDLINE INDUSTRIES, INC.

## (undated) DEVICE — DRILL BIT: Brand: MICA

## (undated) DEVICE — Y-TYPE TUR/BLADDER IRRIGATION SET, REGULATING CLAMP

## (undated) DEVICE — ELECTRODE PT RET AD L9FT HI MOIST COND ADH HYDRGEL CORDED

## (undated) DEVICE — AMD ANTIMICROBIAL GAUZE SPONGES,12 PLY USP TYPE VII, 0.2% POLYHEXAMETHYLENE BIGUANIDE HCI (PHMB): Brand: CURITY

## (undated) DEVICE — BNDG ELAS ESMARK 4INX12FT LF -- STRL

## (undated) DEVICE — TURP TURB: Brand: MEDLINE INDUSTRIES, INC.

## (undated) DEVICE — SYRINGE 20ML LL S/C 50

## (undated) DEVICE — CATHETER URETH 24FR BLLN 30CC STD LTX 3 W TWO OPP DRNGE EYE

## (undated) DEVICE — DEVICE STBL AD TRICOT ANCHR PD FOR 3 W F CATH STATLOK

## (undated) DEVICE — BNDG ELAS COBAN 2INX5YD NS --

## (undated) DEVICE — (D)PREP SKN CHLRAPRP APPL 26ML -- CONVERT TO ITEM 371833

## (undated) DEVICE — GLOVE SURG SZ 65 L12IN FNGR THK79MIL GRN LTX FREE

## (undated) DEVICE — BAG DRAIN UROLOGY GENESIS NS

## (undated) DEVICE — DRAPE,U/SHT,SPLIT,FILM,60X84,STERILE: Brand: MEDLINE

## (undated) DEVICE — Device

## (undated) DEVICE — GLOVE SURG SZ 65 THK91MIL LTX FREE SYN POLYISOPRENE

## (undated) DEVICE — SOLUTION IV 1000ML 0.9% SOD CHL

## (undated) DEVICE — INTENDED FOR TISSUE SEPARATION, AND OTHER PROCEDURES THAT REQUIRE A SHARP SURGICAL BLADE TO PUNCTURE OR CUT.: Brand: BARD-PARKER ® STAINLESS STEEL BLADES

## (undated) DEVICE — BAG,DRAINAGE,4L,A/R TOWER,LL,SLIDE TAP: Brand: MEDLINE

## (undated) DEVICE — ELECTRODE,CUTTING,STERILE.24FR: Brand: N.A.

## (undated) DEVICE — BIT DRL L40MM DIA2MM SLD SIDE CUT FOR GEMINUS VOLAR DST RAD

## (undated) DEVICE — SUTURE MCRYL SZ 3-0 L27IN ABSRB UD L19MM PS-2 3/8 CIR PRIM Y427H

## (undated) DEVICE — SYRINGE MED 30ML STD CLR PLAS LUERLOCK TIP N CTRL DISP

## (undated) DEVICE — TUBING, SUCTION, 1/4" X 10', STRAIGHT: Brand: MEDLINE

## (undated) DEVICE — PADDING CAST W3INXL4YD COT BLEND MIC PLEAT UNDERCAST SPEC

## (undated) DEVICE — ELECTRODE,COAG,STERILE,BALL END: Brand: N.A.

## (undated) DEVICE — ZIMMER® STERILE DISPOSABLE TOURNIQUET CUFF WITH PROTECTIVE SLEEVE, DUAL PORT, SINGLE BLADDER, 34 IN. (86 CM)

## (undated) DEVICE — GLOVE SURG SZ 75 L12IN FNGR THK79MIL GRN LTX FREE

## (undated) DEVICE — DRESSING,GAUZE,XEROFORM,CURAD,1"X8",ST: Brand: CURAD

## (undated) DEVICE — C-ARM: Brand: UNBRANDED

## (undated) DEVICE — STRIP,CLOSURE,WOUND,MEDI-STRIP,1/2X4: Brand: MEDLINE

## (undated) DEVICE — SOLUTION IRRIG 1000ML 0.9% SOD CHL USP POUR PLAS BTL

## (undated) DEVICE — DRIVER SURG UNIV QUIK CONN T10 FOR VOLAR DST RAD PLATING

## (undated) DEVICE — DRIVER SURG SQ TIP DIA2MM PEG TORQ LIMITING FOR GEMINUS

## (undated) DEVICE — CARDINAL HEALTH FLEXIBLE LIGHT HANDLE COVER: Brand: CARDINAL HEALTH